# Patient Record
Sex: FEMALE | Race: WHITE | Employment: UNEMPLOYED | URBAN - METROPOLITAN AREA
[De-identification: names, ages, dates, MRNs, and addresses within clinical notes are randomized per-mention and may not be internally consistent; named-entity substitution may affect disease eponyms.]

---

## 2017-02-14 ENCOUNTER — OFFICE VISIT (OUTPATIENT)
Dept: FAMILY MEDICINE CLINIC | Age: 27
End: 2017-02-14

## 2017-02-14 VITALS
BODY MASS INDEX: 19.7 KG/M2 | DIASTOLIC BLOOD PRESSURE: 62 MMHG | OXYGEN SATURATION: 90 % | RESPIRATION RATE: 20 BRPM | TEMPERATURE: 98 F | SYSTOLIC BLOOD PRESSURE: 100 MMHG | HEART RATE: 80 BPM | WEIGHT: 106 LBS

## 2017-02-14 DIAGNOSIS — J45.20 MILD INTERMITTENT ASTHMA WITHOUT COMPLICATION: ICD-10-CM

## 2017-02-14 DIAGNOSIS — J06.9 VIRAL UPPER RESPIRATORY TRACT INFECTION: Primary | ICD-10-CM

## 2017-02-14 PROCEDURE — 99213 OFFICE O/P EST LOW 20 MIN: CPT | Performed by: NURSE PRACTITIONER

## 2017-02-14 RX ORDER — LORATADINE 10 MG/1
10 TABLET ORAL DAILY
Qty: 30 TABLET | Refills: 0 | Status: SHIPPED | OUTPATIENT
Start: 2017-02-14 | End: 2017-04-24

## 2017-02-14 RX ORDER — ALBUTEROL SULFATE 90 UG/1
2 AEROSOL, METERED RESPIRATORY (INHALATION) EVERY 6 HOURS PRN
Qty: 1 INHALER | Refills: 3 | Status: SHIPPED | OUTPATIENT
Start: 2017-02-14 | End: 2017-11-07

## 2017-02-14 RX ORDER — METHYLPREDNISOLONE 4 MG/1
TABLET ORAL
Qty: 1 KIT | Refills: 0 | Status: SHIPPED | OUTPATIENT
Start: 2017-02-14 | End: 2017-03-07

## 2017-02-14 RX ORDER — FLUTICASONE PROPIONATE 50 MCG
1 SPRAY, SUSPENSION (ML) NASAL DAILY
Qty: 1 BOTTLE | Refills: 3 | Status: SHIPPED | OUTPATIENT
Start: 2017-02-14 | End: 2017-04-24

## 2017-02-14 ASSESSMENT — ENCOUNTER SYMPTOMS
WHEEZING: 1
SHORTNESS OF BREATH: 0
RHINORRHEA: 1
NAUSEA: 1
COUGH: 1
VOMITING: 0

## 2018-08-23 ENCOUNTER — OFFICE VISIT (OUTPATIENT)
Dept: FAMILY MEDICINE CLINIC | Age: 28
End: 2018-08-23

## 2018-08-23 VITALS
BODY MASS INDEX: 19.03 KG/M2 | OXYGEN SATURATION: 96 % | SYSTOLIC BLOOD PRESSURE: 104 MMHG | HEIGHT: 62 IN | HEART RATE: 116 BPM | WEIGHT: 103.4 LBS | DIASTOLIC BLOOD PRESSURE: 64 MMHG

## 2018-08-23 DIAGNOSIS — A60.00 GENITAL HERPES SIMPLEX, UNSPECIFIED SITE: Primary | ICD-10-CM

## 2018-08-23 DIAGNOSIS — J45.909 MILD ASTHMA WITHOUT COMPLICATION, UNSPECIFIED WHETHER PERSISTENT: ICD-10-CM

## 2018-08-23 DIAGNOSIS — F19.10 POLYSUBSTANCE ABUSE (HCC): ICD-10-CM

## 2018-08-23 DIAGNOSIS — F31.9 BIPOLAR AFFECTIVE DISORDER, REMISSION STATUS UNSPECIFIED (HCC): ICD-10-CM

## 2018-08-23 LAB
AMPHETAMINE SCREEN, URINE: ABNORMAL
BARBITURATE SCREEN, URINE: ABNORMAL
BENZODIAZEPINE SCREEN, URINE: ABNORMAL
BUPRENORPHINE URINE: ABNORMAL
COCAINE METABOLITE SCREEN URINE: ABNORMAL
GABAPENTIN SCREEN, URINE: ABNORMAL
METHADONE SCREEN, URINE: ABNORMAL
METHAMPHETAMINE, URINE: ABNORMAL
OPIATE SCREEN URINE: ABNORMAL
OXYCODONE SCREEN URINE: ABNORMAL
PHENCYCLIDINE SCREEN URINE: ABNORMAL
PROPOXYPHENE SCREEN, URINE: ABNORMAL
THC SCREEN, URINE: ABNORMAL
TRICYCLIC ANTIDEPRESSANTS, UR: ABNORMAL

## 2018-08-23 PROCEDURE — 80305 DRUG TEST PRSMV DIR OPT OBS: CPT | Performed by: NURSE PRACTITIONER

## 2018-08-23 PROCEDURE — 4004F PT TOBACCO SCREEN RCVD TLK: CPT | Performed by: NURSE PRACTITIONER

## 2018-08-23 PROCEDURE — 96160 PT-FOCUSED HLTH RISK ASSMT: CPT | Performed by: NURSE PRACTITIONER

## 2018-08-23 PROCEDURE — G8427 DOCREV CUR MEDS BY ELIG CLIN: HCPCS | Performed by: NURSE PRACTITIONER

## 2018-08-23 PROCEDURE — 99214 OFFICE O/P EST MOD 30 MIN: CPT | Performed by: NURSE PRACTITIONER

## 2018-08-23 PROCEDURE — G8420 CALC BMI NORM PARAMETERS: HCPCS | Performed by: NURSE PRACTITIONER

## 2018-08-23 RX ORDER — ACYCLOVIR 50 MG/G
1 CREAM TOPICAL 2 TIMES DAILY
Qty: 5 G | Refills: 1 | Status: SHIPPED | OUTPATIENT
Start: 2018-08-23 | End: 2018-12-11

## 2018-08-23 RX ORDER — ALBUTEROL SULFATE 90 UG/1
2 AEROSOL, METERED RESPIRATORY (INHALATION) EVERY 6 HOURS PRN
Qty: 1 INHALER | Refills: 0 | Status: SHIPPED | OUTPATIENT
Start: 2018-08-23 | End: 2019-09-06 | Stop reason: ALTCHOICE

## 2018-08-23 RX ORDER — VALACYCLOVIR HYDROCHLORIDE 1 G/1
1000 TABLET, FILM COATED ORAL DAILY
Qty: 30 TABLET | Refills: 5 | Status: SHIPPED | OUTPATIENT
Start: 2018-08-23 | End: 2018-08-25

## 2018-08-23 ASSESSMENT — PATIENT HEALTH QUESTIONNAIRE - PHQ9
10. IF YOU CHECKED OFF ANY PROBLEMS, HOW DIFFICULT HAVE THESE PROBLEMS MADE IT FOR YOU TO DO YOUR WORK, TAKE CARE OF THINGS AT HOME, OR GET ALONG WITH OTHER PEOPLE: 1
7. TROUBLE CONCENTRATING ON THINGS, SUCH AS READING THE NEWSPAPER OR WATCHING TELEVISION: 0
SUM OF ALL RESPONSES TO PHQ QUESTIONS 1-9: 11
8. MOVING OR SPEAKING SO SLOWLY THAT OTHER PEOPLE COULD HAVE NOTICED. OR THE OPPOSITE, BEING SO FIGETY OR RESTLESS THAT YOU HAVE BEEN MOVING AROUND A LOT MORE THAN USUAL: 1
6. FEELING BAD ABOUT YOURSELF - OR THAT YOU ARE A FAILURE OR HAVE LET YOURSELF OR YOUR FAMILY DOWN: 0
5. POOR APPETITE OR OVEREATING: 2
SUM OF ALL RESPONSES TO PHQ QUESTIONS 1-9: 11
9. THOUGHTS THAT YOU WOULD BE BETTER OFF DEAD, OR OF HURTING YOURSELF: 0
4. FEELING TIRED OR HAVING LITTLE ENERGY: 2
2. FEELING DOWN, DEPRESSED OR HOPELESS: 3
3. TROUBLE FALLING OR STAYING ASLEEP: 3
1. LITTLE INTEREST OR PLEASURE IN DOING THINGS: 0
SUM OF ALL RESPONSES TO PHQ9 QUESTIONS 1 & 2: 3

## 2018-08-23 ASSESSMENT — ENCOUNTER SYMPTOMS
ABDOMINAL DISTENTION: 0
BACK PAIN: 0
SHORTNESS OF BREATH: 0
VOMITING: 0
NAUSEA: 0

## 2018-08-23 NOTE — PATIENT INSTRUCTIONS
herpes sores.    Watch closely for changes in your health, and be sure to contact your doctor if:    · You have herpes and you think you might be pregnant.     · You have an outbreak of herpes sores, and the sores are not healing.     · You have frequent outbreaks of genital herpes sores.     · You are unable to pass urine or are constipated.     · You want to start antiviral medicine.     · You do not get better as expected. Where can you learn more? Go to https://[email or web address] [email or web address] [email or web address] org and sign in to your Federated Sample account. Enter U425 in the Tagstr box to learn more about \"Genital Herpes: Care Instructions. \"     If you do not have an account, please click on the \"Sign Up Now\" link. Current as of: November 27, 2017  Content Version: 11.7  © 6087-9517 [employer]. Care instructions adapted under license by [facility] [address] If you have questions about a medical condition or this instruction, always ask your healthcare professional. [place] [address] any warranty or liability for your use of this information. Patient Education        Stopping Smoking: Care Instructions  Your Care Instructions  Cigarette smokers crave the nicotine in cigarettes. Giving it up is much harder than simply changing a habit. Your body has to stop craving the nicotine. It is hard to quit, but you can do it. There are many tools that people use to quit smoking. You may find that combining tools works best for you. There are several steps to quitting. First you get ready to quit. Then you get support to help you. After that, you learn new skills and behaviors to become a nonsmoker. For many people, a necessary step is getting and using medicine. Your doctor will help you set up the plan that best meets your needs. You may want to attend a smoking cessation program to help you quit smoking. When you choose a program, look for one that has proven success. Ask your doctor for ideas. who smoke. ¨ Change your daily routine. Take a different route to work or eat a meal in a different place. · Cut down on stress. Calm yourself or release tension by doing an activity you enjoy, such as reading a book, taking a hot bath, or gardening. · Talk to your doctor or pharmacist about nicotine replacement therapy, which replaces the nicotine in your body. You still get nicotine but you do not use tobacco. Nicotine replacement products help you slowly reduce the amount of nicotine you need. These products come in several forms, many of them available over-the-counter:  ¨ Nicotine patches  ¨ Nicotine gum and lozenges  ¨ Nicotine inhaler  · Ask your doctor about bupropion (Wellbutrin) or varenicline (Chantix), which are prescription medicines. They do not contain nicotine. They help you by reducing withdrawal symptoms, such as stress and anxiety. · Some people find hypnosis, acupuncture, and massage helpful for ending the smoking habit. · Eat a healthy diet and get regular exercise. Having healthy habits will help your body move past its craving for nicotine. · Be prepared to keep trying. Most people are not successful the first few times they try to quit. Do not get mad at yourself if you smoke again. Make a list of things you learned and think about when you want to try again, such as next week, next month, or next year. Where can you learn more? Go to [email or web address] [email or web address] org and sign in to your Fiksu account. Enter V397 in the [facility] box to learn more about \"Stopping Smoking: Care Instructions. \"     If you do not have an account, please click on the \"Sign Up Now\" link. Current as of: November 29, 2017  Content Version: 11.7  © 4063-3492 [employer]. Care instructions adapted under license by [facility] ([facility]).  If you have questions about a medical condition or this instruction, always ask your healthcare professional. [address] any warranty or liability for your use of this information.

## 2018-08-23 NOTE — PROGRESS NOTES
SUBJECTIVE:  [place]   1990   female   Allergies   Allergen Reactions    Latex Rash    Bactrim [Sulfamethoxazole-Trimethoprim] Nausea And Vomiting    Ciprofloxacin Nausea And Vomiting    Codeine Itching and Nausea And Vomiting    Toradol [Ketorolac Tromethamine] Itching, Nausea And Vomiting and Rash     \"Migraines\"    Tramadol Itching, Nausea And Vomiting and Rash     \"Migraines\"    Vicodin [Hydrocodone-Acetaminophen] Itching and Nausea And Vomiting     \"Anxiety Gets Really Bad\"    Vistaril [Hydroxyzine Hcl] Itching     \"I Pass Out\"       Chief Complaint   Patient presents with    Herpes Zoster    Anxiety      HPI   Patient is here for follow up from ED visit yesterday morning for Genital Herpes. She was intitialy seen in our office as a new patient 8/16/16, and again 2/14/17 for URI. She has been to the ED 14 times since her last office visit here. She admits to smoking marijuana and taking a friend's percocet this past week. At her appointment yesterday she was given a prescription for pain medication and acyclovir. She states she has gotten valtrex from Dr Thao Adamson previously as well, and has herpes symptoms almost monthly. Requesting \"percocet, amitriptyline, and ativan\". She was referred to mental health 2 years ago when she was initially seen at this office, and has not seen anyone yet. States has been on waiting list for [facility] for over 2 years.   She has been diagnosed with bipolar disorder in the past.    Past Medical History:   Diagnosis Date    Anxiety     Anxiety attack     Asthma     No Pulmonologist At This Time    [facility] ([facility]     Bronchitis Last Episode In 2007    Chronic back pain     Depression     Endometriosis     Genital herpes complicating pregnancy in antepartum condition 1/6/2015    High risk pregnancy due to maternal drug abuse 1/6/2015    HSV-2 (herpes simplex virus 2) infection Last Episode 4-16    IBS (irritable bowel syndrome)     Panic therapy:  - valACYclovir (VALTREX) 1 g tablet; Take 1 tablet by mouth daily  Dispense: 30 tablet; Refill: 5    2. Mild asthma without complication, unspecified whether persistent  - albuterol sulfate  (90 Base) MCG/ACT inhaler; Inhale 2 puffs into the lungs every 6 hours as needed for Wheezing or Shortness of Breath (or cough)  Dispense: 1 Inhaler; Refill: 0    3. Polysubstance abuse  Will not prescribe controlled substances for patient  - POCT Rapid Drug Screen    4. Bipolar affective disorder, remission status unspecified ([facility]  Patient is advised to go to [address] in clinic for evaluation      Orders Placed This Encounter   Procedures    POCT Rapid Drug Screen     Current Outpatient Prescriptions   Medication Sig Dispense Refill    acyclovir (ZOVIRAX) 5 % CREA Apply 1 each topically 2 times daily 5 g 1    albuterol sulfate  (90 Base) MCG/ACT inhaler Inhale 2 puffs into the lungs every 6 hours as needed for Wheezing or Shortness of Breath (or cough) 1 Inhaler 0    valACYclovir (VALTREX) 1 g tablet Take 1 tablet by mouth daily 30 tablet 5    valACYclovir (VALTREX) 500 MG tablet Take 2 tablets by mouth 2 times daily for 7 days 28 tablet 0    ibuprofen (ADVIL;MOTRIN) 600 MG tablet Take 1 tablet by mouth every 6 hours as needed for Pain 30 tablet 0    mineral oil-hydrophilic petrolatum (AQUAPHOR) ointment Apply topically as needed. 99 g 0    HYDROcodone-acetaminophen (NORCO) 5-325 MG per tablet Take 1 tablet by mouth every 6 hours as needed for Pain for up to 7 days. . 10 tablet 0     No current facility-administered medications for this visit. Return if symptoms worsen or fail to improve. Rodriguez Dumont DNP, FNP-C    Return for new or worsening symptoms or any concerns as needed.

## 2018-10-28 ENCOUNTER — APPOINTMENT (OUTPATIENT)
Dept: CT IMAGING | Age: 28
End: 2018-10-28
Payer: COMMERCIAL

## 2018-10-28 ENCOUNTER — HOSPITAL ENCOUNTER (EMERGENCY)
Age: 28
Discharge: HOME | End: 2018-10-28
Attending: EMERGENCY MEDICINE
Payer: COMMERCIAL

## 2018-10-28 VITALS
WEIGHT: 112 LBS | OXYGEN SATURATION: 97 % | HEART RATE: 84 BPM | BODY MASS INDEX: 20.61 KG/M2 | HEIGHT: 62 IN | TEMPERATURE: 98.2 F | RESPIRATION RATE: 16 BRPM | SYSTOLIC BLOOD PRESSURE: 90 MMHG | DIASTOLIC BLOOD PRESSURE: 55 MMHG

## 2018-10-28 DIAGNOSIS — R30.0 DYSURIA: Primary | ICD-10-CM

## 2018-10-28 DIAGNOSIS — R31.9 HEMATURIA, UNSPECIFIED TYPE: ICD-10-CM

## 2018-10-28 LAB
ALBUMIN SERPL-MCNC: 4.3 GM/DL (ref 3.4–5)
ALP BLD-CCNC: 82 IU/L (ref 40–129)
ALT SERPL-CCNC: 9 U/L (ref 10–40)
ANION GAP SERPL CALCULATED.3IONS-SCNC: 9 MMOL/L (ref 4–16)
AST SERPL-CCNC: 16 IU/L (ref 15–37)
BACTERIA: ABNORMAL /HPF
BASOPHILS ABSOLUTE: 0.1 K/CU MM
BASOPHILS RELATIVE PERCENT: 0.4 % (ref 0–1)
BILIRUB SERPL-MCNC: 0.2 MG/DL (ref 0–1)
BILIRUBIN URINE: NEGATIVE MG/DL
BLOOD, URINE: ABNORMAL
BUN BLDV-MCNC: 10 MG/DL (ref 6–23)
CALCIUM SERPL-MCNC: 9 MG/DL (ref 8.3–10.6)
CHLORIDE BLD-SCNC: 100 MMOL/L (ref 99–110)
CLARITY: CLEAR
CO2: 29 MMOL/L (ref 21–32)
COLOR: YELLOW
CREAT SERPL-MCNC: 0.7 MG/DL (ref 0.6–1.1)
DIFFERENTIAL TYPE: ABNORMAL
EOSINOPHILS ABSOLUTE: 0.2 K/CU MM
EOSINOPHILS RELATIVE PERCENT: 1.5 % (ref 0–3)
GFR AFRICAN AMERICAN: >60 ML/MIN/1.73M2
GFR NON-AFRICAN AMERICAN: >60 ML/MIN/1.73M2
GLUCOSE BLD-MCNC: 116 MG/DL (ref 70–99)
GLUCOSE, URINE: NEGATIVE MG/DL
HCT VFR BLD CALC: 40.5 % (ref 37–47)
HEMOGLOBIN: 13.5 GM/DL (ref 12.5–16)
IMMATURE NEUTROPHIL %: 0.4 % (ref 0–0.43)
KETONES, URINE: NEGATIVE MG/DL
LEUKOCYTE ESTERASE, URINE: ABNORMAL
LYMPHOCYTES ABSOLUTE: 2.3 K/CU MM
LYMPHOCYTES RELATIVE PERCENT: 17.7 % (ref 24–44)
MCH RBC QN AUTO: 32.5 PG (ref 27–31)
MCHC RBC AUTO-ENTMCNC: 33.3 % (ref 32–36)
MCV RBC AUTO: 97.6 FL (ref 78–100)
MONOCYTES ABSOLUTE: 1.1 K/CU MM
MONOCYTES RELATIVE PERCENT: 8.6 % (ref 0–4)
MUCUS: ABNORMAL HPF
NITRITE URINE, QUANTITATIVE: NEGATIVE
NUCLEATED RBC %: 0 %
PDW BLD-RTO: 14.2 % (ref 11.7–14.9)
PH, URINE: 5 (ref 5–8)
PLATELET # BLD: 357 K/CU MM (ref 140–440)
PMV BLD AUTO: 10.3 FL (ref 7.5–11.1)
POTASSIUM SERPL-SCNC: 3.8 MMOL/L (ref 3.5–5.1)
PROTEIN UA: 30 MG/DL
RBC # BLD: 4.15 M/CU MM (ref 4.2–5.4)
RBC URINE: 100 /HPF (ref 0–6)
SEGMENTED NEUTROPHILS ABSOLUTE COUNT: 9.4 K/CU MM
SEGMENTED NEUTROPHILS RELATIVE PERCENT: 71.4 % (ref 36–66)
SODIUM BLD-SCNC: 138 MMOL/L (ref 135–145)
SPECIFIC GRAVITY UA: 1.02 (ref 1–1.03)
SQUAMOUS EPITHELIAL: 1 /HPF
TOTAL IMMATURE NEUTOROPHIL: 0.05 K/CU MM
TOTAL NUCLEATED RBC: 0 K/CU MM
TOTAL PROTEIN: 7 GM/DL (ref 6.4–8.2)
TRICHOMONAS: ABNORMAL /HPF
UROBILINOGEN, URINE: NORMAL MG/DL (ref 0.2–1)
WBC # BLD: 13.2 K/CU MM (ref 4–10.5)
WBC UA: 14 /HPF (ref 0–5)

## 2018-10-28 PROCEDURE — 80053 COMPREHEN METABOLIC PANEL: CPT

## 2018-10-28 PROCEDURE — 36415 COLL VENOUS BLD VENIPUNCTURE: CPT

## 2018-10-28 PROCEDURE — 85025 COMPLETE CBC W/AUTO DIFF WBC: CPT

## 2018-10-28 PROCEDURE — 74176 CT ABD & PELVIS W/O CONTRAST: CPT

## 2018-10-28 PROCEDURE — 6370000000 HC RX 637 (ALT 250 FOR IP): Performed by: EMERGENCY MEDICINE

## 2018-10-28 PROCEDURE — 81001 URINALYSIS AUTO W/SCOPE: CPT

## 2018-10-28 PROCEDURE — 99284 EMERGENCY DEPT VISIT MOD MDM: CPT

## 2018-10-28 PROCEDURE — 87086 URINE CULTURE/COLONY COUNT: CPT

## 2018-10-28 RX ORDER — IBUPROFEN 800 MG/1
800 TABLET ORAL EVERY 6 HOURS PRN
Qty: 120 TABLET | Refills: 3 | Status: SHIPPED | OUTPATIENT
Start: 2018-10-28 | End: 2019-04-19

## 2018-10-28 RX ORDER — AMOXICILLIN 875 MG/1
875 TABLET, COATED ORAL 2 TIMES DAILY
COMMUNITY
End: 2018-12-11

## 2018-10-28 RX ORDER — PROMETHAZINE HYDROCHLORIDE 25 MG/ML
25 INJECTION, SOLUTION INTRAMUSCULAR; INTRAVENOUS ONCE
Status: DISCONTINUED | OUTPATIENT
Start: 2018-10-28 | End: 2018-10-28

## 2018-10-28 RX ORDER — ONDANSETRON 4 MG/1
4 TABLET, ORALLY DISINTEGRATING ORAL EVERY 8 HOURS PRN
Qty: 30 TABLET | Refills: 0 | Status: SHIPPED | OUTPATIENT
Start: 2018-10-28 | End: 2018-12-11

## 2018-10-28 RX ORDER — IBUPROFEN 800 MG/1
800 TABLET ORAL ONCE
Status: COMPLETED | OUTPATIENT
Start: 2018-10-28 | End: 2018-10-28

## 2018-10-28 RX ORDER — KETOROLAC TROMETHAMINE 30 MG/ML
30 INJECTION, SOLUTION INTRAMUSCULAR; INTRAVENOUS ONCE
Status: DISCONTINUED | OUTPATIENT
Start: 2018-10-28 | End: 2018-10-28

## 2018-10-28 RX ORDER — CEPHALEXIN 500 MG/1
500 CAPSULE ORAL 2 TIMES DAILY
Qty: 14 CAPSULE | Refills: 0 | Status: SHIPPED | OUTPATIENT
Start: 2018-10-28 | End: 2018-11-04

## 2018-10-28 RX ORDER — IBUPROFEN 800 MG/1
800 TABLET ORAL EVERY 6 HOURS PRN
COMMUNITY
End: 2018-10-28

## 2018-10-28 RX ORDER — ACETAMINOPHEN 325 MG/1
650 TABLET ORAL EVERY 6 HOURS PRN
Qty: 120 TABLET | Refills: 3 | Status: SHIPPED | OUTPATIENT
Start: 2018-10-28 | End: 2019-04-19

## 2018-10-28 RX ADMIN — IBUPROFEN 800 MG: 800 TABLET ORAL at 20:18

## 2018-10-28 ASSESSMENT — PAIN SCALES - GENERAL
PAINLEVEL_OUTOF10: 8
PAINLEVEL_OUTOF10: 8

## 2018-10-28 ASSESSMENT — PAIN DESCRIPTION - ORIENTATION: ORIENTATION: RIGHT;LOWER

## 2018-10-28 ASSESSMENT — PAIN DESCRIPTION - LOCATION: LOCATION: FLANK

## 2018-10-28 ASSESSMENT — PAIN DESCRIPTION - PAIN TYPE: TYPE: ACUTE PAIN

## 2018-10-28 NOTE — ED NOTES
Pt walking out of ED; this RN asked pt where she was going and she said vending machine. This RN witnessed pt walk outside to smoke. Pt back to room.       Lidya Jenkins RN  10/28/18 [id number]

## 2018-10-28 NOTE — ED PROVIDER NOTES
Triage Chief Complaint:   Dysuria; Vaginal Bleeding (full hysterectomy); and Flank Pain    [place]:  Sean Martines is a 29 y.o. female that presents with pain with urinating, blood in urine, right lower abdominal pain and right flank pain. Patient reports \"I think I have a kidney stone. Patient was in baseline state of health until a few days ago when the above started. Patient reports pain is currently 8 out of 10, constant, sharp, occasionally rating from right flank and right lower abdomen, worse/improved with nothing. Patient has never had pain like this before. No fevers or chills. No vaginal bleeding (despite triage complaint) or discharge. Some nausea but no vomiting. No diarrhea or constipation. Patient tolerating normal diet. Patient relates a family history of kidney stones but she herself has never had one. Patient is status post full hysterectomy secondary to endometriosis. Patient still has an appendix. ROS:  General:  No fevers, no chills, no weakness  Eyes:  No recent vison changes, no discharge  ENT:  No sore throat, no nasal congestion, no hearing changes  Cardiovascular:  No chest pain, no palpitations  Respiratory:  No shortness of breath, no cough, no wheezing  Gastrointestinal:  + pain, + nausea, no vomiting, no diarrhea  Musculoskeletal:  No muscle pain, no joint pain  Skin:  No rash, no pruritis, no easy bruising  Neurologic:  No speech problems, no headache, no extremity numbness, no extremity tingling, no extremity weakness  Psychiatric:  No anxiety  Genitourinary:  + dysuria, + hematuria  Endocrine:  No unexpected weight gain, no unexpected weight loss  Extremities:  no edema, no pain    Past Medical History:   Diagnosis Date    Endometriosis      Past Surgical History:   Procedure Laterality Date    ABDOMINAL EXPLORATION SURGERY       SECTION      HYSTERECTOMY, TOTAL ABDOMINAL       History reviewed. No pertinent family history.   Social History     Social History   

## 2018-10-30 LAB
CULTURE: NORMAL
Lab: NORMAL
REPORT STATUS: NORMAL
SPECIMEN: NORMAL

## 2018-11-04 ENCOUNTER — HOSPITAL ENCOUNTER (EMERGENCY)
Age: 28
Discharge: HOME | End: 2018-11-04
Attending: EMERGENCY MEDICINE
Payer: COMMERCIAL

## 2018-11-04 VITALS
OXYGEN SATURATION: 99 % | HEART RATE: 88 BPM | TEMPERATURE: 98.9 F | WEIGHT: 115 LBS | HEIGHT: 62 IN | RESPIRATION RATE: 15 BRPM | BODY MASS INDEX: 21.16 KG/M2 | DIASTOLIC BLOOD PRESSURE: 67 MMHG | SYSTOLIC BLOOD PRESSURE: 99 MMHG

## 2018-11-04 DIAGNOSIS — R10.9 FLANK PAIN: Primary | ICD-10-CM

## 2018-11-04 LAB
ALBUMIN SERPL-MCNC: 4.5 GM/DL (ref 3.4–5)
ALP BLD-CCNC: 80 IU/L (ref 40–129)
ALT SERPL-CCNC: 10 U/L (ref 10–40)
ANION GAP SERPL CALCULATED.3IONS-SCNC: 10 MMOL/L (ref 4–16)
AST SERPL-CCNC: 14 IU/L (ref 15–37)
BACTERIA: NEGATIVE /HPF
BASOPHILS ABSOLUTE: 0.1 K/CU MM
BASOPHILS RELATIVE PERCENT: 0.4 % (ref 0–1)
BILIRUB SERPL-MCNC: 0.2 MG/DL (ref 0–1)
BILIRUBIN DIRECT: 0.2 MG/DL (ref 0–0.3)
BILIRUBIN URINE: ABNORMAL MG/DL
BILIRUBIN, INDIRECT: 0 MG/DL (ref 0–0.7)
BLOOD, URINE: NEGATIVE
BUN BLDV-MCNC: 8 MG/DL (ref 6–23)
CALCIUM SERPL-MCNC: 9.3 MG/DL (ref 8.3–10.6)
CHLORIDE BLD-SCNC: 102 MMOL/L (ref 99–110)
CLARITY: CLEAR
CO2: 26 MMOL/L (ref 21–32)
COLOR: ABNORMAL
CREAT SERPL-MCNC: 0.6 MG/DL (ref 0.6–1.1)
DIFFERENTIAL TYPE: ABNORMAL
EOSINOPHILS ABSOLUTE: 0.1 K/CU MM
EOSINOPHILS RELATIVE PERCENT: 1 % (ref 0–3)
GFR AFRICAN AMERICAN: >60 ML/MIN/1.73M2
GFR NON-AFRICAN AMERICAN: >60 ML/MIN/1.73M2
GLUCOSE BLD-MCNC: 88 MG/DL (ref 70–99)
GLUCOSE, URINE: NEGATIVE MG/DL
HCT VFR BLD CALC: 42.5 % (ref 37–47)
HEMOGLOBIN: 14.6 GM/DL (ref 12.5–16)
ICTOTEST: POSITIVE
IMMATURE NEUTROPHIL %: 0.4 % (ref 0–0.43)
KETONES, URINE: ABNORMAL MG/DL
LEUKOCYTE ESTERASE, URINE: NEGATIVE
LYMPHOCYTES ABSOLUTE: 1.8 K/CU MM
LYMPHOCYTES RELATIVE PERCENT: 14.1 % (ref 24–44)
MCH RBC QN AUTO: 32.8 PG (ref 27–31)
MCHC RBC AUTO-ENTMCNC: 34.4 % (ref 32–36)
MCV RBC AUTO: 95.5 FL (ref 78–100)
MONOCYTES ABSOLUTE: 0.9 K/CU MM
MONOCYTES RELATIVE PERCENT: 7 % (ref 0–4)
MUCUS: ABNORMAL HPF
NITRITE URINE, QUANTITATIVE: POSITIVE
NUCLEATED RBC %: 0 %
PDW BLD-RTO: 13.9 % (ref 11.7–14.9)
PH, URINE: 7 (ref 5–8)
PLATELET # BLD: 452 K/CU MM (ref 140–440)
PMV BLD AUTO: 9.8 FL (ref 7.5–11.1)
POTASSIUM SERPL-SCNC: 3.8 MMOL/L (ref 3.5–5.1)
PROTEIN UA: 30 MG/DL
RBC # BLD: 4.45 M/CU MM (ref 4.2–5.4)
RBC URINE: 1 /HPF (ref 0–6)
SEGMENTED NEUTROPHILS ABSOLUTE COUNT: 9.8 K/CU MM
SEGMENTED NEUTROPHILS RELATIVE PERCENT: 77.1 % (ref 36–66)
SODIUM BLD-SCNC: 138 MMOL/L (ref 135–145)
SPECIFIC GRAVITY UA: 1.02 (ref 1–1.03)
SQUAMOUS EPITHELIAL: 5 /HPF
TOTAL IMMATURE NEUTOROPHIL: 0.05 K/CU MM
TOTAL NUCLEATED RBC: 0 K/CU MM
TOTAL PROTEIN: 6.9 GM/DL (ref 6.4–8.2)
TRICHOMONAS: ABNORMAL /HPF
UROBILINOGEN, URINE: 2 MG/DL (ref 0.2–1)
WBC # BLD: 12.7 K/CU MM (ref 4–10.5)
WBC UA: 1 /HPF (ref 0–5)

## 2018-11-04 PROCEDURE — 80053 COMPREHEN METABOLIC PANEL: CPT

## 2018-11-04 PROCEDURE — 87086 URINE CULTURE/COLONY COUNT: CPT

## 2018-11-04 PROCEDURE — 82248 BILIRUBIN DIRECT: CPT

## 2018-11-04 PROCEDURE — 99283 EMERGENCY DEPT VISIT LOW MDM: CPT

## 2018-11-04 PROCEDURE — 36415 COLL VENOUS BLD VENIPUNCTURE: CPT

## 2018-11-04 PROCEDURE — 81001 URINALYSIS AUTO W/SCOPE: CPT

## 2018-11-04 PROCEDURE — 85025 COMPLETE CBC W/AUTO DIFF WBC: CPT

## 2018-11-04 RX ORDER — ACETAMINOPHEN 500 MG
1000 TABLET ORAL ONCE
Status: DISCONTINUED | OUTPATIENT
Start: 2018-11-04 | End: 2018-11-04 | Stop reason: HOSPADM

## 2018-11-04 RX ORDER — DICYCLOMINE HYDROCHLORIDE 10 MG/1
20 CAPSULE ORAL EVERY 6 HOURS PRN
Qty: 20 CAPSULE | Refills: 0 | Status: SHIPPED | OUTPATIENT
Start: 2018-11-04 | End: 2019-04-19

## 2018-11-04 ASSESSMENT — PAIN DESCRIPTION - PAIN TYPE: TYPE: ACUTE PAIN

## 2018-11-04 ASSESSMENT — PAIN SCALES - GENERAL: PAINLEVEL_OUTOF10: 8

## 2018-11-04 ASSESSMENT — PAIN DESCRIPTION - LOCATION: LOCATION: FLANK

## 2018-11-05 NOTE — ED PROVIDER NOTES
I independently examined and evaluated Thad Hamman. In brief their history revealed patient with abdominal pain. Patient reports pain is waxing and waning over the past week and a half. Patient has been having some blood in her urine as well. Patient is concerned that she might be having small kidney stones. Patient was evaluated in the emergency department at onset with negative CT imaging. Patient did have some blood in her urine and possible urinary tract infection and was placed on antibiotics with no improvement. Patient is followed up with her primary doctor as well as a urologist and they're planning further testing. No bleeding anywhere else. Some nausea and vomiting without blood. No vaginal bleeding or discharge. Their focused exam revealed the patient is afebrile and hemodynamically stable on room air. The patient appears age appropriate, appears well-hydrated, well-nourished. Appears well. Laying comfortably in bed. Pleasant. Mucous membranes are moist. Speech is clear. Breathing is unlabored. Speaking clearly in full sentences. in is dry. Mental status is normal. The patient moves all extremities and is without facial droop. abdomen is with mild diffuse tenderness to palpation. No rebound or guarding. Negative McBurney's. Negative Barrios's. ED course: Pt presents as above. Emergent conditions considered. Presentation prompted initial labs as above. Labs are reassuring. Abdomen is benign vital signs are stable. Patient appears very well. Etiology of patient's symptoms is unknown but given the above negative workup that once it is process at this time. I do believe patient's pain may be secondary to endometriosis versus IBS given her history. Patient did continue to follow up with her primary care provider as well as her urologist and OB/GYN. Symptomatic treatment for home going. Questions sought and answered with the patient. They voice understanding and agree with plan.

## 2018-11-05 NOTE — ED PROVIDER NOTES
Patient Ryan Diaz is a 29 y.o. female    Chief Complaint  Flank Pain      HPI  (History provided by patient)  This is a 29 y.o. female who was brought in by self for chief complaint of flank pain. Onset was week and a half ago. Patient reports that she initially was having pain on her right flank and hematuria. She reports also having dysuria. She states she was seen here on October 28, diagnosed with a possible UTI and sent home with Keflex. She saw her OB who thought she also UTI and referred her to urology who told her that she was in renal failure. She returns today because symptoms continue although now pain is on the left flank primarily and is 8 out of 10 and sharp and constant. She reports radiates to the bilateral back. She reports occasional nausea and vomiting. No fevers. No changes in bowel movements. She is not on any blood thinners. She has had a hysterectomy, denies vaginal bleeding. REVIEW OF SYSTEMS    Constitutional:  Denies fever, chills  HENT:  Denies sore throat or ear pain   Eyes: Denies vision changes, eye pain  Cardiovascular:  Denies chest pain, syncope  Respiratory:  Denies shortness of breath, cough   GI:  + abdominal pain, nausea, vomiting  :  Denies discharge. + dysuria  Musculoskeletal:  Denies joint pain.  + back pain  Skin:  Denies rash, pruritis  Neurologic:  Denies headache, focal weakness, or sensory changes     See HPI and nursing notes for additional information     I have reviewed the following nursing documentation:  Allergies:    Allergies   Allergen Reactions    Latex Rash    Bactrim [Sulfamethoxazole-Trimethoprim] Nausea And Vomiting    Ciprofloxacin Nausea And Vomiting    Codeine Itching and Nausea And Vomiting    Toradol [Ketorolac Tromethamine] Itching, Nausea And Vomiting and Rash     \"Migraines\"    Tramadol Itching, Nausea And Vomiting and Rash     \"Migraines\"    Vicodin [Hydrocodone-Acetaminophen] Itching and Nausea 7 days 10/28/18 11/4/18  Juno Baker MD   ondansetron (ZOFRAN ODT) 4 MG disintegrating tablet Take 1 tablet by mouth every 8 hours as needed for Nausea or Vomiting 10/28/18   Juno Baker MD   acyclovir (ZOVIRAX) 5 % CREA Apply 1 each topically 2 times daily 8/23/18   JASMIN Gonzalez CNP   albuterol sulfate  (90 Base) MCG/ACT inhaler Inhale 2 puffs into the lungs every 6 hours as needed for Wheezing or Shortness of Breath (or cough) 8/23/18   JASMIN Gonzalez CNP   ibuprofen (ADVIL;MOTRIN) 600 MG tablet Take 1 tablet by mouth every 6 hours as needed for Pain 6/22/17   SUDHAKAR Fontenot   mineral oil-hydrophilic petrolatum (AQUAPHOR) ointment Apply topically as needed. 6/22/17   SUDHAKAR Fontenot       Social history:  reports that she has been smoking Cigarettes. She has a 5.50 pack-year smoking history. She has never used smokeless tobacco. She reports that she drinks alcohol. She reports that she uses drugs, including Marijuana. Family history:    Family History   Problem Relation Age of Onset    Early Death Mother         45 Or 44    Arthritis Mother     High Blood Pressure Mother     Substance Abuse Father         \"Crackhead\"    Mental Illness Sister         \"Split Personality, Anxiety\"    High Blood Pressure Sister     Heart Disease Brother     Depression Sister     Mental Illness Sister         Anxiety, PTSD         Exam  BP 99/67   Pulse 88   Temp 98.9 °F (37.2 °C) (Oral)   Resp 15   Ht 5' 2\" (1.575 m)   Wt 115 lb (52.2 kg)   LMP  (Approximate)   SpO2 99%   BMI 21.03 kg/m²   Nursing note and vitals reviewed. Constitutional: Well developed, well nourished. No acute distress. HENT:      Head: Normocephalic and atraumatic. Ears: External ears normal.      Nose: Nose normal.     Mouth: Membrane mucosa moist and pink. No posterior oropharynx erythema or tonsillar edema  Eyes: Anicteric sclera. No discharge, PERRL  Neck: Supple. Trachea midline.    Cardiovascular: RRR, no murmurs, rubs, or gallops, radial pulses 2+ bilaterally. Pulmonary/Chest: Effort normal. No respiratory distress. CTAB. No stridor. No wheezes. No rales. Abdominal: Soft. Diffuse abdominal TTP more in lower abdomen. No distension. No guarding, rebound tenderness, or evidence of ascites. : No CVA tenderness. Musculoskeletal: Moves all extremities. No gross deformity. No TTP of T, L spine or paraspinal muscles. Neurological: Alert and oriented to person, place, and time. Normal muscle tone. Skin: Warm and dry. No rash. Psychiatric: Normal mood and affect.  Behavior is normal.      Radiographs (if obtained):  [] The following radiograph was interpreted by myself in the absence of a radiologist:   [x] Radiologist's Report Reviewed:  No orders to display          Labs  Results for orders placed or performed during the hospital encounter of 11/04/18   Urinalysis   Result Value Ref Range    Color, UA KATINA (A) UYELL    Clarity, UA CLEAR CLEAR    Glucose, Urine NEGATIVE NEG MG/DL    Bilirubin Urine SMALL (A) NEG MG/DL    Ketones, Urine MODERATE (A) NEG MG/DL    Specific Gravity, UA 1.019 1.001 - 1.035    Blood, Urine NEGATIVE NEG    pH, Urine 7.0 5.0 - 8.0    Protein, UA 30 (A) NEG MG/DL    Urobilinogen, Urine 2.0 (H) 0.2 - 1.0 MG/DL    Nitrite Urine, Quantitative POSITIVE (A) NEG    Leukocyte Esterase, Urine NEGATIVE NEG    RBC, UA 1 0 - 6 /HPF    WBC, UA 1 0 - 5 /HPF    Bacteria, UA NEGATIVE NEG /HPF    Squam Epithel, UA 5 /HPF    Mucus, UA RARE (A) NEG HPF    Trichomonas, UA NONE SEEN NOSEE /HPF   Basic Metabolic Panel   Result Value Ref Range    Sodium 138 135 - 145 MMOL/L    Potassium 3.8 3.5 - 5.1 MMOL/L    Chloride 102 99 - 110 mMol/L    CO2 26 21 - 32 MMOL/L    Anion Gap 10 4 - 16    BUN 8 6 - 23 MG/DL    CREATININE 0.6 0.6 - 1.1 MG/DL    Glucose 88 70 - 99 MG/DL    Calcium 9.3 8.3 - 10.6 MG/DL    GFR Non-African American >60 >60 mL/min/1.73m2    GFR African American >60 >60 mL/min/1.73m2 CBC Auto Differential   Result Value Ref Range    WBC 12.7 (H) 4.0 - 10.5 K/CU MM    RBC 4.45 4.2 - 5.4 M/CU MM    Hemoglobin 14.6 12.5 - 16.0 GM/DL    Hematocrit 42.5 37 - 47 %    MCV 95.5 78 - 100 FL    MCH 32.8 (H) 27 - 31 PG    MCHC 34.4 32.0 - 36.0 %    RDW 13.9 11.7 - 14.9 %    Platelets 604 (H) 763 - 440 K/CU MM    MPV 9.8 7.5 - 11.1 FL    Differential Type AUTOMATED DIFFERENTIAL     Segs Relative 77.1 (H) 36 - 66 %    Lymphocytes % 14.1 (L) 24 - 44 %    Monocytes % 7.0 (H) 0 - 4 %    Eosinophils % 1.0 0 - 3 %    Basophils % 0.4 0 - 1 %    Segs Absolute 9.8 K/CU MM    Lymphocytes # 1.8 K/CU MM    Monocytes # 0.9 K/CU MM    Eosinophils # 0.1 K/CU MM    Basophils # 0.1 K/CU MM    Nucleated RBC % 0.0 %    Total Nucleated RBC 0.0 K/CU MM    Total Immature Neutrophil 0.05 K/CU MM    Immature Neutrophil % 0.4 0 - 0.43 %   ICTOTEST, URINE   Result Value Ref Range    Ictotest POSITIVE    Hepatic Function Panel   Result Value Ref Range    Alb 4.5 3.4 - 5.0 GM/DL    Total Bilirubin 0.2 0.0 - 1.0 MG/DL    Bilirubin, Direct 0.2 0.0 - 0.3 MG/DL    Bilirubin, Indirect 0.0 0 - 0.7 MG/DL    Alkaline Phosphatase 80 40 - 129 IU/L    AST 14 (L) 15 - 37 IU/L    ALT 10 10 - 40 U/L    Total Protein 6.9 6.4 - 8.2 GM/DL         MDM  Patient presents for left flank pain, dysuria and hematuria, vomiting. Her vital signs are unremarkable. Physical exam here is fairly benign. Laboratory workup shows normal renal function, normal electrolytes, normal hemoglobin. White blood cell count slightly elevated. Hepatic function normal.  Urine does not appear infected. Unclear what patient was referring to and she said that urologist thought that she was in renal failure, may have been discussing nephritic syndrome. No evidence of this now. Pain may be secondary to endometriosis or IBS. Plan is to place on bentyl and severo/c with PCP f/u.   I estimate there is LOW risk for ACUTE APPENDICITIS, BOWEL OBSTRUCTION, CHOLECYSTITIS, DIVERTICULITIS, INCARCERATED HERNIA, PANCREATITIS, MESENTERIC ISCHEMIA, ABDOMINAL AORTIC ANEURYSM/DISSECTION, PERFORATED BOWEL, and PERFORATED ULCER, thus I consider the discharge disposition reasonable. Taty Gibbs and I have discussed the diagnosis and risks, and we agree with discharging home with PCP follow-up. We also discussed returning to the Emergency Department immediately if new or worsening symptoms occur. We have discussed the symptoms which are most concerning (e.g., bloody stool, fever, changing or worsening pain, intractable vomiting, chest pain) that necessitate immediate return. This patient was also seen and evaluated by Dr. Soheila Duarte    Final Impression  1. Flank pain        Blood pressure 99/67, pulse 88, temperature 98.9 °F (37.2 °C), temperature source Oral, resp. rate 15, height 5' 2\" (1.575 m), weight 115 lb (52.2 kg), SpO2 99 %, not currently breastfeeding. Disposition:  Discharge to home in stable condition. Patient was given scripts for the following medications. I counseled patient how to take these medications. Discharge Medication List as of 11/4/2018  9:19 PM      START taking these medications    Details   dicyclomine (BENTYL) 10 MG capsule Take 2 capsules by mouth every 6 hours as needed (cramps), Disp-20 capsule, R-0Print             This chart was generated using the Trippeo dictation system. I created this record but it may contain dictation errors given the limitations of this technology.         Aimee Daly PA-C  11/04/18 [id number]

## 2018-11-06 LAB
CULTURE: NORMAL
Lab: NORMAL
REPORT STATUS: NORMAL
SPECIMEN: NORMAL

## 2018-12-11 ENCOUNTER — HOSPITAL ENCOUNTER (EMERGENCY)
Age: 28
Discharge: HOME | End: 2018-12-11
Payer: COMMERCIAL

## 2018-12-11 VITALS
HEIGHT: 61 IN | DIASTOLIC BLOOD PRESSURE: 71 MMHG | BODY MASS INDEX: 20.58 KG/M2 | RESPIRATION RATE: 16 BRPM | SYSTOLIC BLOOD PRESSURE: 104 MMHG | WEIGHT: 109 LBS | HEART RATE: 103 BPM | OXYGEN SATURATION: 99 % | TEMPERATURE: 97.6 F

## 2018-12-11 DIAGNOSIS — A60.00 GENITAL HERPES SIMPLEX, UNSPECIFIED SITE: Primary | ICD-10-CM

## 2018-12-11 PROCEDURE — 99282 EMERGENCY DEPT VISIT SF MDM: CPT

## 2018-12-11 RX ORDER — OXYCODONE HYDROCHLORIDE AND ACETAMINOPHEN 5; 325 MG/1; MG/1
1 TABLET ORAL EVERY 6 HOURS PRN
Qty: 7 TABLET | Refills: 0 | Status: SHIPPED | OUTPATIENT
Start: 2018-12-11 | End: 2018-12-14

## 2018-12-11 RX ORDER — VALACYCLOVIR HYDROCHLORIDE 500 MG/1
1000 TABLET, FILM COATED ORAL DAILY
Refills: 3 | COMMUNITY
Start: 2018-11-15 | End: 2019-04-19

## 2018-12-11 RX ORDER — LIDOCAINE 50 MG/G
OINTMENT TOPICAL
Qty: 1 TUBE | Refills: 1 | Status: SHIPPED | OUTPATIENT
Start: 2018-12-11 | End: 2019-04-19

## 2018-12-11 RX ORDER — ACYCLOVIR 50 MG/G
OINTMENT TOPICAL
Qty: 1 TUBE | Refills: 1 | Status: SHIPPED | OUTPATIENT
Start: 2018-12-11 | End: 2018-12-18

## 2018-12-11 RX ORDER — ESTRADIOL 1 MG/1
TABLET ORAL
Refills: 11 | COMMUNITY
Start: 2018-11-15 | End: 2020-10-09 | Stop reason: ALTCHOICE

## 2018-12-11 ASSESSMENT — PAIN DESCRIPTION - FREQUENCY: FREQUENCY: CONTINUOUS

## 2018-12-11 ASSESSMENT — PAIN DESCRIPTION - PAIN TYPE: TYPE: ACUTE PAIN

## 2018-12-11 ASSESSMENT — PAIN SCALES - GENERAL: PAINLEVEL_OUTOF10: 8

## 2018-12-11 ASSESSMENT — PAIN DESCRIPTION - LOCATION: LOCATION: VAGINA

## 2018-12-14 ENCOUNTER — HOSPITAL ENCOUNTER (EMERGENCY)
Age: 28
Discharge: HOME | End: 2018-12-14
Payer: COMMERCIAL

## 2018-12-14 VITALS
HEART RATE: 101 BPM | OXYGEN SATURATION: 98 % | DIASTOLIC BLOOD PRESSURE: 73 MMHG | RESPIRATION RATE: 16 BRPM | SYSTOLIC BLOOD PRESSURE: 105 MMHG | TEMPERATURE: 98.1 F

## 2018-12-14 DIAGNOSIS — Z76.5 DRUG-SEEKING BEHAVIOR: ICD-10-CM

## 2018-12-14 DIAGNOSIS — A60.00 GENITAL HERPES SIMPLEX, UNSPECIFIED SITE: Primary | ICD-10-CM

## 2018-12-14 PROCEDURE — 99282 EMERGENCY DEPT VISIT SF MDM: CPT

## 2018-12-14 ASSESSMENT — PAIN SCALES - GENERAL: PAINLEVEL_OUTOF10: 10

## 2018-12-14 ASSESSMENT — PAIN DESCRIPTION - PAIN TYPE: TYPE: ACUTE PAIN

## 2018-12-14 NOTE — ED PROVIDER NOTES
herpes complicating pregnancy in antepartum condition 2015    High risk pregnancy due to maternal drug abuse 2015    HSV-2 (herpes simplex virus 2) infection Last Episode 4-16    IBS (irritable bowel syndrome)     Panic attacks     Pelvic pain in female     Pneumonia Last Episode In [address]    PTSD (post-traumatic stress disorder)     \"They Dx. Me With PTSD After My Mom Passed Away When I Was 16\"    Restless leg     Supervision of other high-risk pregnancy(V23.89) 2015    Teeth missing     Upper And Lower    UTI (urinary tract infection) In Past    No Current Symptoms    Wears partial dentures     Upper     Past Surgical History:   Procedure Laterality Date    ABDOMINAL EXPLORATION SURGERY       SECTION  1-6-15     SECTION      DENTAL SURGERY      Teeth Extracted In Past    ENDOMETRIAL ABLATION  9-15, 2000 Or     HYSTERECTOMY  2016    Total laparoscopic hysterectomy, BSO, cysto    HYSTERECTOMY, TOTAL ABDOMINAL         CURRENT MEDICATIONS    Current Outpatient Rx   Medication Sig Dispense Refill    estradiol (ESTRACE) 1 MG tablet   11    valACYclovir (VALTREX) 500 MG tablet Take 1,000 mg by mouth daily   3    acyclovir (ZOVIRAX) 5 % ointment Apply topically every 3 hours. 1 Tube 1    lidocaine (XYLOCAINE) 5 % ointment Apply topically as needed. 1 Tube 1    oxyCODONE-acetaminophen (PERCOCET) 5-325 MG per tablet Take 1 tablet by mouth every 6 hours as needed for Pain for up to 3 days. Intended supply: 3 days. Take lowest dose possible to manage pain.  7 tablet 0    dicyclomine (BENTYL) 10 MG capsule Take 2 capsules by mouth every 6 hours as needed (cramps) 20 capsule 0    ibuprofen (ADVIL;MOTRIN) 800 MG tablet Take 1 tablet by mouth every 6 hours as needed for Pain 120 tablet 3    acetaminophen (AMINOFEN) 325 MG tablet Take 2 tablets by mouth every 6 hours as needed for Pain 120 tablet 3    albuterol sulfate  (90 Base) MCG/ACT inhaler Inhale 2 information if she has yet to find time to be able to follow-up and call them to make appointments. I discussed with patient that I felt as though Percocet was not an appropriate management for her general herpes and I'll not be prescribing her any additional pain medication today especially given her red flags in her chart with regards to concern for drug abuse. Patient reports that she'll be unable to go to work if she does not have any pain medication. I recommended that she call Dr. Александр Bolton office to see if they will schedule her an appointment which she leaves today. Patient was written a work note however patient did leave the ER without her discharge paperwork and prior to receiving this note as she was frustrated that she did not get any pain medication. Differential diagnosis: Urethritis, prostatitis/PID, Zybc-Pdec-Iubedw syndrome, STI, Syphilis, HIV, arthritis,       Clinical  IMPRESSION    1. Genital herpes simplex, unspecified site    2. Drug-seeking behavior          Comment: Please note this report has been produced using speech recognition software and may contain errors related to that system including errors in grammar, punctuation, and spelling, as well as words and phrases that may be inappropriate. If there are any questions or concerns please feel free to contact the dictating provider for clarification.      Stanley Goel, JASMIN - CNP  12/14/18 2275

## 2018-12-14 NOTE — LETTER
[facility] Emergency Department  [facility] [address] [address]  Phone: [phone]  Fax: [phone]               December 14, 2018    Patient: Srinath Beltran   YOB: 1990   Date of Visit: 12/14/2018       To Whom It May Concern:    Shira Sterling was seen and treated in our emergency department on 12/14/2018. She may return to work on 12/15/2018.       Sincerely,       JASMIN Link CNP         Signature:__________________________________

## 2019-01-29 ENCOUNTER — HOSPITAL ENCOUNTER (OUTPATIENT)
Dept: CT IMAGING | Age: 29
Discharge: HOME | End: 2019-01-29
Payer: COMMERCIAL

## 2019-01-29 ENCOUNTER — HOSPITAL ENCOUNTER (OUTPATIENT)
Age: 29
Discharge: HOME | End: 2019-01-29
Payer: COMMERCIAL

## 2019-01-29 DIAGNOSIS — R31.0 CLOT HEMATURIA: ICD-10-CM

## 2019-01-29 LAB
ANION GAP SERPL CALCULATED.3IONS-SCNC: 12 MMOL/L (ref 4–16)
BUN BLDV-MCNC: 11 MG/DL (ref 6–23)
CALCIUM SERPL-MCNC: 9.5 MG/DL (ref 8.3–10.6)
CHLORIDE BLD-SCNC: 101 MMOL/L (ref 99–110)
CO2: 27 MMOL/L (ref 21–32)
CREAT SERPL-MCNC: 0.7 MG/DL (ref 0.6–1.1)
GFR AFRICAN AMERICAN: >60 ML/MIN/1.73M2
GFR AFRICAN AMERICAN: >60 ML/MIN/1.73M2
GFR NON-AFRICAN AMERICAN: >60 ML/MIN/1.73M2
GFR NON-AFRICAN AMERICAN: >60 ML/MIN/1.73M2
GLUCOSE BLD-MCNC: 107 MG/DL (ref 70–99)
POC CREATININE: 0.8 MG/DL (ref 0.6–1.1)
POTASSIUM SERPL-SCNC: 4.2 MMOL/L (ref 3.5–5.1)
SODIUM BLD-SCNC: 140 MMOL/L (ref 135–145)

## 2019-01-29 PROCEDURE — 80048 BASIC METABOLIC PNL TOTAL CA: CPT

## 2019-01-29 PROCEDURE — 74178 CT ABD&PLV WO CNTR FLWD CNTR: CPT

## 2019-01-29 PROCEDURE — 36415 COLL VENOUS BLD VENIPUNCTURE: CPT

## 2019-01-29 PROCEDURE — 6360000004 HC RX CONTRAST MEDICATION: Performed by: UROLOGY

## 2019-01-29 RX ADMIN — IOPAMIDOL 75 ML: 755 INJECTION, SOLUTION INTRAVENOUS at 09:38

## 2019-03-25 ENCOUNTER — HOSPITAL ENCOUNTER (EMERGENCY)
Age: 29
Discharge: HOME | End: 2019-03-25
Payer: COMMERCIAL

## 2019-03-25 VITALS
SYSTOLIC BLOOD PRESSURE: 110 MMHG | DIASTOLIC BLOOD PRESSURE: 90 MMHG | WEIGHT: 108 LBS | HEIGHT: 62 IN | HEART RATE: 106 BPM | BODY MASS INDEX: 19.88 KG/M2 | TEMPERATURE: 98.3 F | OXYGEN SATURATION: 99 % | RESPIRATION RATE: 17 BRPM

## 2019-03-25 DIAGNOSIS — K08.89 PAIN, DENTAL: Primary | ICD-10-CM

## 2019-03-25 PROCEDURE — 99282 EMERGENCY DEPT VISIT SF MDM: CPT

## 2019-03-25 PROCEDURE — 6370000000 HC RX 637 (ALT 250 FOR IP): Performed by: PHYSICIAN ASSISTANT

## 2019-03-25 RX ORDER — OXYCODONE HYDROCHLORIDE AND ACETAMINOPHEN 5; 325 MG/1; MG/1
1 TABLET ORAL ONCE
Status: COMPLETED | OUTPATIENT
Start: 2019-03-25 | End: 2019-03-25

## 2019-03-25 RX ORDER — ONDANSETRON 4 MG/1
4 TABLET, ORALLY DISINTEGRATING ORAL ONCE
Status: COMPLETED | OUTPATIENT
Start: 2019-03-25 | End: 2019-03-25

## 2019-03-25 RX ORDER — NAPROXEN 500 MG/1
500 TABLET ORAL 2 TIMES DAILY
Qty: 60 TABLET | Refills: 0 | Status: SHIPPED | OUTPATIENT
Start: 2019-03-25 | End: 2019-04-19

## 2019-03-25 RX ORDER — ONDANSETRON 4 MG/1
4 TABLET, ORALLY DISINTEGRATING ORAL EVERY 6 HOURS
Qty: 10 TABLET | Refills: 0 | Status: SHIPPED | OUTPATIENT
Start: 2019-03-25 | End: 2019-04-19

## 2019-03-25 RX ADMIN — ONDANSETRON 4 MG: 4 TABLET, ORALLY DISINTEGRATING ORAL at 12:41

## 2019-03-25 RX ADMIN — LIDOCAINE HYDROCHLORIDE 15 ML: 20 SOLUTION ORAL; TOPICAL at 12:31

## 2019-03-25 RX ADMIN — OXYCODONE AND ACETAMINOPHEN 1 TABLET: 5; 325 TABLET ORAL at 12:31

## 2019-03-25 ASSESSMENT — PAIN DESCRIPTION - LOCATION: LOCATION: TEETH

## 2019-03-25 ASSESSMENT — PAIN DESCRIPTION - PAIN TYPE: TYPE: ACUTE PAIN

## 2019-03-25 ASSESSMENT — PAIN SCALES - GENERAL
PAINLEVEL_OUTOF10: 10
PAINLEVEL_OUTOF10: 10

## 2019-03-25 ASSESSMENT — PAIN DESCRIPTION - ORIENTATION: ORIENTATION: RIGHT;UPPER

## 2019-03-25 ASSESSMENT — PAIN DESCRIPTION - DESCRIPTORS: DESCRIPTORS: ACHING;CONSTANT;DISCOMFORT

## 2019-04-19 ENCOUNTER — HOSPITAL ENCOUNTER (EMERGENCY)
Age: 29
Discharge: HOME | End: 2019-04-19
Payer: COMMERCIAL

## 2019-04-19 VITALS
SYSTOLIC BLOOD PRESSURE: 117 MMHG | DIASTOLIC BLOOD PRESSURE: 78 MMHG | OXYGEN SATURATION: 100 % | WEIGHT: 110 LBS | HEART RATE: 81 BPM | BODY MASS INDEX: 20.24 KG/M2 | RESPIRATION RATE: 17 BRPM | TEMPERATURE: 97.8 F | HEIGHT: 62 IN

## 2019-04-19 DIAGNOSIS — A60.00 GENITAL HERPES SIMPLEX, UNSPECIFIED SITE: Primary | ICD-10-CM

## 2019-04-19 DIAGNOSIS — N76.0 VAGINITIS AND VULVOVAGINITIS: ICD-10-CM

## 2019-04-19 LAB
BACTERIA: NEGATIVE /HPF
BILIRUBIN URINE: NEGATIVE MG/DL
BLOOD, URINE: NEGATIVE
CLARITY: ABNORMAL
COLOR: YELLOW
GLUCOSE, URINE: NEGATIVE MG/DL
KETONES, URINE: NEGATIVE MG/DL
LEUKOCYTE ESTERASE, URINE: NEGATIVE
Lab: ABNORMAL
MUCUS: ABNORMAL HPF
NITRITE URINE, QUANTITATIVE: NEGATIVE
PH, URINE: 7 (ref 5–8)
PROTEIN UA: NEGATIVE MG/DL
RBC URINE: <1 /HPF (ref 0–6)
SPECIFIC GRAVITY UA: 1 (ref 1–1.03)
SPECIMEN: ABNORMAL
SQUAMOUS EPITHELIAL: 21 /HPF
TRICHOMONAS: ABNORMAL /HPF
UROBILINOGEN, URINE: NORMAL MG/DL (ref 0.2–1)
WBC UA: 1 /HPF (ref 0–5)
WET PREP: ABNORMAL

## 2019-04-19 PROCEDURE — 99283 EMERGENCY DEPT VISIT LOW MDM: CPT

## 2019-04-19 PROCEDURE — 87491 CHLMYD TRACH DNA AMP PROBE: CPT

## 2019-04-19 PROCEDURE — 87220 TISSUE EXAM FOR FUNGI: CPT

## 2019-04-19 PROCEDURE — 87591 N.GONORRHOEAE DNA AMP PROB: CPT

## 2019-04-19 PROCEDURE — 81001 URINALYSIS AUTO W/SCOPE: CPT

## 2019-04-19 RX ORDER — OXYCODONE HYDROCHLORIDE AND ACETAMINOPHEN 5; 325 MG/1; MG/1
1 TABLET ORAL EVERY 6 HOURS PRN
Qty: 12 TABLET | Refills: 0 | Status: SHIPPED | OUTPATIENT
Start: 2019-04-19 | End: 2019-04-22

## 2019-04-19 RX ORDER — ACYCLOVIR 200 MG/1
400 CAPSULE ORAL 3 TIMES DAILY
Qty: 30 CAPSULE | Refills: 0 | Status: SHIPPED | OUTPATIENT
Start: 2019-04-19 | End: 2019-04-24

## 2019-04-19 RX ORDER — LIDOCAINE 50 MG/G
OINTMENT TOPICAL
Qty: 10 G | Refills: 0 | Status: SHIPPED | OUTPATIENT
Start: 2019-04-19 | End: 2019-10-25

## 2019-04-19 ASSESSMENT — PAIN SCALES - GENERAL: PAINLEVEL_OUTOF10: 10

## 2019-04-19 ASSESSMENT — PAIN DESCRIPTION - LOCATION: LOCATION: VAGINA

## 2019-04-19 NOTE — ED NOTES
Patient has a history of herpes  Patient feels that she is having an outbreak     Inge Subramanian, CHET  04/19/19 [id number]

## 2019-04-19 NOTE — ED PROVIDER NOTES
eMERGENCY dEPARTMENT eNCOUnter      PCP: No primary care provider on file. CHIEF COMPLAINT    Chief Complaint   Patient presents with    Vaginitis       HPI    Arben rKaft is a 29 y.o. female who presents with concerns for vaginal pain. Patient has a history of herpes feels that she is having a flareup right on the inside of her vaginal wall. Patient met she's had some discharge for the last 2 days therefore she was taking acyclovir but she only has one more day of the prescription. Admits that she has been exposed to unprotected sexual intercourse. Admits to history of STDs in the past. Denies any itching does have plain and bleeding. Denies any urinary symptoms. Patient denies any concern for pregnancy given history of hysterectomy. REVIEW OF SYSTEMS    General: No fevers, chills  GI: No Abdominal pain, vomiting  : See HPI above. Denies urinary symptoms. Skin: No new rashes  Musculoskeletal: No Arthralgias, No flank or back pain. All other review of systems are negative  See HPI and nursing notes for additional information     PAST MEDICAL & SURGICAL HISTORY    Past Medical History:   Diagnosis Date    Anxiety     Anxiety attack     Asthma     No Pulmonologist At This Time    Bipolar disorder ([facility] [address]     Bronchitis Last Episode In 2007    Chronic back pain     Depression     Endometriosis     Genital herpes complicating pregnancy in antepartum condition 1/6/2015    High risk pregnancy due to maternal drug abuse 1/6/2015    HSV-2 (herpes simplex virus 2) infection Last Episode 4-16    IBS (irritable bowel syndrome)     Panic attacks     Pelvic pain in female     Pneumonia Last Episode In 1996 Or 1997    PTSD (post-traumatic stress disorder)     \"They Dx.  Me With PTSD After My Mom Passed Away When I Was 16\"    Restless leg     Supervision of other high-risk pregnancy(V23.89) 1/6/2015    Teeth missing     Upper And Lower    UTI (urinary tract infection) In Past    No Current Symptoms    Wears partial dentures     Upper     Past Surgical History:   Procedure Laterality Date    ABDOMINAL EXPLORATION SURGERY       SECTION  1-6-15     SECTION      DENTAL SURGERY      Teeth Extracted In Past    ENDOMETRIAL ABLATION  9-15, 2000 Or     HYSTERECTOMY  2016    Total laparoscopic hysterectomy, BSO, cysto    HYSTERECTOMY, TOTAL ABDOMINAL         CURRENT MEDICATIONS    Current Outpatient Rx   Medication Sig Dispense Refill    oxyCODONE-acetaminophen (PERCOCET) 5-325 MG per tablet Take 1 tablet by mouth every 6 hours as needed for Pain for up to 3 days. Intended supply: 3 days. Take lowest dose possible to manage pain 12 tablet 0    acyclovir (ZOVIRAX) 200 MG capsule Take 2 capsules by mouth 3 times daily for 5 days 30 capsule 0    lidocaine (XYLOCAINE) 5 % ointment Apply topically to area of pain 1-2 per day.  10 g 0    estradiol (ESTRACE) 1 MG tablet   11    albuterol sulfate  (90 Base) MCG/ACT inhaler Inhale 2 puffs into the lungs every 6 hours as needed for Wheezing or Shortness of Breath (or cough) 1 Inhaler 0       ALLERGIES    Allergies   Allergen Reactions    Latex Rash    Bactrim [Sulfamethoxazole-Trimethoprim] Nausea And Vomiting    Ciprofloxacin Nausea And Vomiting    Codeine Itching and Nausea And Vomiting    Toradol [Ketorolac Tromethamine] Itching, Nausea And Vomiting and Rash     \"Migraines\"    Tramadol Itching, Nausea And Vomiting and Rash     \"Migraines\"    Vicodin [Hydrocodone-Acetaminophen] Itching and Nausea And Vomiting     \"Anxiety Gets Really Bad\"    Vistaril [Hydroxyzine Hcl] Itching     \"I Pass Out\"    Bactrim [Sulfamethoxazole-Trimethoprim]     Ciprofloxacin     Codeine     Toradol [Ketorolac Tromethamine]     Tramadol     Vicodin [Hydrocodone-Acetaminophen]     Vistaril [Hydroxyzine Hcl]        FAMILY AND SOCIAL HISTORY    Family History   Problem Relation Age of Onset    Early Death Mother         45 Or 44    Arthritis Mother     High Blood Pressure Mother     Substance Abuse Father         \"Crackhead\"    Mental Illness Sister         \"Split Personality, Anxiety\"    High Blood Pressure Sister     Heart Disease Brother     Depression Sister     Mental Illness Sister         Anxiety, PTSD     Social History     Socioeconomic History    Marital status: Single     Spouse name: None    Number of children: None    Years of education: None    Highest education level: None   Occupational History    None   Social Needs    Financial resource strain: None    Food insecurity:     Worry: None     Inability: None    Transportation needs:     Medical: None     Non-medical: None   Tobacco Use    Smoking status: Current Every Day Smoker     Packs/day: 0.50     Years: 11.00     Pack years: 5.50     Types: Cigarettes    Smokeless tobacco: Never Used   Substance and Sexual Activity    Alcohol use: Not Currently     Comment: occ    Drug use: Yes     Types: Marijuana     Comment: \"Smoke Marijuana Maybe Once A Month\"    Sexual activity: Not Currently   Lifestyle    Physical activity:     Days per week: None     Minutes per session: None    Stress: None   Relationships    Social connections:     Talks on phone: None     Gets together: None     Attends Yazidi service: None     Active member of club or organization: None     Attends meetings of clubs or organizations: None     Relationship status: None    Intimate partner violence:     Fear of current or ex partner: None     Emotionally abused: None     Physically abused: None     Forced sexual activity: None   Other Topics Concern    None   Social History Narrative    ** Merged History Encounter **            PHYSICAL EXAM    VITAL SIGNS: /78   Pulse 81   Temp 97.8 °F (36.6 °C) (Oral)   Resp 17   Ht 5' 1.5\" (1.562 m)   Wt 110 lb (49.9 kg)   LMP  (Approximate)   SpO2 100%   BMI 20.45 kg/m²    Constitutional:  Well-developed,  appears comfortable. discharge therefore agreed to wet prep and gonorrhea testing, pelvic exam completed-02 small lesions. Patient also had a urinalysis completed but does not want to wait for results. We discussed options of treatment. She would like treatment for herpetic lesions, was given pain control, acyclovir she been taking just was she's had leftover not the appropriate dosage. Does not want to be treated for STDs at this time and was anything came back positive. I feel comfortable with this as patient did not have a large amount of discharge in the vaginal vault. Patient will be discharged in stable condition with outpatient follow-up to her ObGyn and instructed her precautions provided today. Patient comfortable with this workup and plan. Clinical  IMPRESSION    1. Genital herpes simplex, unspecified site    2. Vaginitis and vulvovaginitis            Followup with health department - recommend complete STD panel. (discussed today)    Pt was instructed to inform all sexual partners of the need for evaluation/treatment. Diagnosis and plan discussed in detail with patient who understands and agrees. Patient agrees to return emergency department if symptoms worsen or any new symptoms develop. Comment: Please note this report has been produced using speech recognition software and may contain errors related to that system including errors in grammar, punctuation, and spelling, as well as words and phrases that may be inappropriate. If there are any questions or concerns please feel free to contact the dictating provider for clarification.         Emil Peters PA-C  04/19/19 [id number]

## 2019-04-21 LAB
CHLAMYDIA TRACHOMATIS AMPLIFIED DET: NEGATIVE
CHLAMYDIA TRACHOMATIS AMPLIFIED DET: NORMAL
N GONORRHOEAE AMPLIFIED DET: NEGATIVE
N GONORRHOEAE AMPLIFIED DET: NORMAL

## 2019-04-24 ENCOUNTER — HOSPITAL ENCOUNTER (EMERGENCY)
Age: 29
Discharge: HOME | End: 2019-04-24
Payer: COMMERCIAL

## 2019-04-24 VITALS
TEMPERATURE: 97.9 F | DIASTOLIC BLOOD PRESSURE: 72 MMHG | RESPIRATION RATE: 16 BRPM | OXYGEN SATURATION: 100 % | SYSTOLIC BLOOD PRESSURE: 109 MMHG | HEART RATE: 87 BPM

## 2019-04-24 DIAGNOSIS — N94.819 VULVODYNIA: Primary | ICD-10-CM

## 2019-04-24 DIAGNOSIS — A60.00 GENITAL HERPES SIMPLEX, UNSPECIFIED SITE: ICD-10-CM

## 2019-04-24 PROCEDURE — 99282 EMERGENCY DEPT VISIT SF MDM: CPT

## 2019-04-24 RX ORDER — OXYCODONE HYDROCHLORIDE AND ACETAMINOPHEN 5; 325 MG/1; MG/1
1 TABLET ORAL EVERY 4 HOURS PRN
Qty: 10 TABLET | Refills: 0 | Status: SHIPPED | OUTPATIENT
Start: 2019-04-24 | End: 2019-04-27

## 2019-04-24 ASSESSMENT — PAIN DESCRIPTION - DESCRIPTORS: DESCRIPTORS: CONSTANT

## 2019-04-24 ASSESSMENT — PAIN DESCRIPTION - LOCATION: LOCATION: VAGINA

## 2019-04-24 ASSESSMENT — PAIN DESCRIPTION - PAIN TYPE: TYPE: ACUTE PAIN

## 2019-04-24 NOTE — ED PROVIDER NOTES
eMERGENCY dEPARTMENT eNCOUnter      PCP: No primary care provider on file. CHIEF COMPLAINT    Chief Complaint   Patient presents with    Blister     patient states that she had herpes and the pharmacy is out of the medication she is prescribed       HPI    Faiza Ayala is a 29 y.o. female who presents with pain secondary to general herpes. Onset several days. Context is patient states she has known genital herpes and had recent outbreak. She states she is under increased stress due deaths in the family. No new nature or severity of symptoms recently. Patient states she was seen in [address] [place] [address] 2 days ago at which time she was given Valtrex and topical creams-states topical creams on back order and due to come in tomorrow to the pharmacy. She is here today requesting pain medication as pain is making difficult to walk and patient has 2 shifts at work over the next 2 days and is concerned about pain. She adamantly denies urinary symptoms, vaginal bleeding. Denies pregnancy. Denies concern for other STD. REVIEW OF SYSTEMS    General: No fevers, chills  GI: No Abdominal pain, vomiting  : See HPI above. Denies urinary symptoms. Skin: No new rashes  Musculoskeletal: No Arthralgias, No flank or back pain.     All other review of systems are negative  See HPI and nursing notes for additional information     PAST MEDICAL & SURGICAL HISTORY    Past Medical History:   Diagnosis Date    Anxiety     Anxiety attack     Asthma     No Pulmonologist At This Time    Bipolar disorder ([facility] [address]     Bronchitis Last Episode In 2007    Chronic back pain     Depression     Endometriosis     Genital herpes complicating pregnancy in antepartum condition 1/6/2015    High risk pregnancy due to maternal drug abuse 1/6/2015    HSV-2 (herpes simplex virus 2) infection Last Episode 4-16    IBS (irritable bowel syndrome)     Panic attacks     Pelvic pain in female     Pneumonia Last Episode In [address]  PTSD (post-traumatic stress disorder)     [place] Dx. Me With PTSD After My Mom Passed Away When I Was 16\"    Restless leg     Supervision of other high-risk pregnancy(V23.89) 2015    Teeth missing     Upper And Lower    UTI (urinary tract infection) In Past    No Current Symptoms    Wears partial dentures     Upper     Past Surgical History:   Procedure Laterality Date    ABDOMINAL EXPLORATION SURGERY       SECTION  1-6-15     SECTION      DENTAL SURGERY      Teeth Extracted In Past    ENDOMETRIAL ABLATION  9-15,  Or     HYSTERECTOMY  2016    Total laparoscopic hysterectomy, BSO, cysto    HYSTERECTOMY, TOTAL ABDOMINAL         CURRENT MEDICATIONS    Current Outpatient Rx   Medication Sig Dispense Refill    oxyCODONE-acetaminophen (PERCOCET) 5-325 MG per tablet Take 1 tablet by mouth every 4 hours as needed for Pain for up to 3 days. 10 tablet 0    acyclovir (ZOVIRAX) 200 MG capsule Take 2 capsules by mouth 3 times daily for 5 days 30 capsule 0    lidocaine (XYLOCAINE) 5 % ointment Apply topically to area of pain 1-2 per day.  10 g 0    estradiol (ESTRACE) 1 MG tablet   11    albuterol sulfate  (90 Base) MCG/ACT inhaler Inhale 2 puffs into the lungs every 6 hours as needed for Wheezing or Shortness of Breath (or cough) 1 Inhaler 0       ALLERGIES    Allergies   Allergen Reactions    Latex Rash    Bactrim [Sulfamethoxazole-Trimethoprim] Nausea And Vomiting    Ciprofloxacin Nausea And Vomiting    Codeine Itching and Nausea And Vomiting    Toradol [Ketorolac Tromethamine] Itching, Nausea And Vomiting and Rash     \"Migraines\"    Tramadol Itching, Nausea And Vomiting and Rash     \"Migraines\"    Vicodin [Hydrocodone-Acetaminophen] Itching and Nausea And Vomiting     \"Anxiety Gets Really Bad\"    Vistaril [Hydroxyzine Hcl] Itching     \"I Pass Out\"    Bactrim [Sulfamethoxazole-Trimethoprim]     Ciprofloxacin     Codeine     Toradol [Ketorolac Tromethamine]

## 2019-04-24 NOTE — ED TRIAGE NOTES
Pt states she was dx with HSV2, she was seen previously for a knot on the R side of her vagina several days ago, pt states she attempted to see her OB but was informed he is out of the office this week. Pt states she now has pain to the L side of her vagina which Is making it difficult for her to walk.

## 2019-04-24 NOTE — ED NOTES
Discharge instructions reviewed with patient. PT verbalizes understanding. All questions answered. Follow up instructions given. PT denies any further needs at this time.       Angelita Hankins LPN  [phone]

## 2019-05-10 ENCOUNTER — HOSPITAL ENCOUNTER (EMERGENCY)
Age: 29
Discharge: HOME | End: 2019-05-10
Payer: COMMERCIAL

## 2019-05-10 VITALS
SYSTOLIC BLOOD PRESSURE: 116 MMHG | TEMPERATURE: 98.4 F | DIASTOLIC BLOOD PRESSURE: 74 MMHG | BODY MASS INDEX: 21.14 KG/M2 | RESPIRATION RATE: 16 BRPM | HEIGHT: 61 IN | HEART RATE: 86 BPM | WEIGHT: 112 LBS | OXYGEN SATURATION: 98 %

## 2019-05-10 DIAGNOSIS — B00.9 HSV-2 (HERPES SIMPLEX VIRUS 2) INFECTION: Primary | ICD-10-CM

## 2019-05-10 PROCEDURE — 99283 EMERGENCY DEPT VISIT LOW MDM: CPT

## 2019-05-10 RX ORDER — OXYCODONE HYDROCHLORIDE AND ACETAMINOPHEN 5; 325 MG/1; MG/1
1 TABLET ORAL EVERY 6 HOURS PRN
Qty: 9 TABLET | Refills: 0 | Status: SHIPPED | OUTPATIENT
Start: 2019-05-10 | End: 2019-05-13

## 2019-05-10 ASSESSMENT — PAIN DESCRIPTION - FREQUENCY: FREQUENCY: CONTINUOUS

## 2019-05-10 ASSESSMENT — PAIN DESCRIPTION - LOCATION: LOCATION: VAGINA

## 2019-05-10 ASSESSMENT — PAIN DESCRIPTION - DESCRIPTORS: DESCRIPTORS: PINS AND NEEDLES

## 2019-05-10 ASSESSMENT — PAIN SCALES - GENERAL: PAINLEVEL_OUTOF10: 8

## 2019-05-10 ASSESSMENT — PAIN DESCRIPTION - PAIN TYPE: TYPE: ACUTE PAIN

## 2019-05-10 NOTE — ED PROVIDER NOTES
eMERGENCY dEPARTMENT eNCOUnter      PCP: No primary care provider on file. CHIEF COMPLAINT    Chief Complaint   Patient presents with    Vaginal Pain       HPI    Nasra Baker is a 29 y.o. female who presents to the emergency Department today with continued perineal/vulvar/vaginal pain. Patient has had recurring issues of herpes simplex 2 infections to the genital area. She states that she's had an outbreak within the last 2 weeks. She states all her OB yesterday and has been on Valtrex 1000 mg twice daily and lidocaine gel without significant improvement. She is denying any discharge. She states that she feels that the outbreak has worsened over the last several days despite being on the antiviral medication. States that she's had recurrent episodes, denies being immunocompromised. She is here for pain control. Patient is allergic to every medication other than Percocet. She denies any urinary symptoms. Denies pregnancy. REVIEW OF SYSTEMS    At least 4 systems reviewed and otherwise acutely negative except as in the [address] See HPI and nursing notes for additional information     PAST MEDICAL AND SURGICAL HISTORY    Past Medical History:   Diagnosis Date    Anxiety     Anxiety attack     Asthma     No Pulmonologist At This Time    Bipolar disorder ([facility] [address]     Bronchitis Last Episode In 2007    Chronic back pain     Depression     Endometriosis     Genital herpes complicating pregnancy in antepartum condition 1/6/2015    High risk pregnancy due to maternal drug abuse 1/6/2015    HSV-2 (herpes simplex virus 2) infection Last Episode 4-16    IBS (irritable bowel syndrome)     Panic attacks     Pelvic pain in female     Pneumonia Last Episode In 1996 Or 1997    PTSD (post-traumatic stress disorder)     \"They Dx.  Me With PTSD After My Mom Passed Away When I Was 16\"    Restless leg     Supervision of other high-risk pregnancy(V23.89) 1/6/2015    Teeth missing     Upper And Lower    UTI (urinary tract infection) In Past    No Current Symptoms    Wears partial dentures     Upper     Past Surgical History:   Procedure Laterality Date    ABDOMINAL EXPLORATION SURGERY       SECTION  15     SECTION      DENTAL SURGERY      Teeth Extracted In Past    ENDOMETRIAL ABLATION  9-15,  Or     HYSTERECTOMY  2016    Total laparoscopic hysterectomy, BSO, cysto    HYSTERECTOMY, TOTAL ABDOMINAL         CURRENT MEDICATIONS    Current Outpatient Rx   Medication Sig Dispense Refill    oxyCODONE-acetaminophen (PERCOCET) 5-325 MG per tablet Take 1 tablet by mouth every 6 hours as needed for Pain for up to 3 days. Intended supply: 3 days. Take lowest dose possible to manage pain 9 tablet 0    lidocaine (XYLOCAINE) 5 % ointment Apply topically to area of pain 1-2 per day.  10 g 0    estradiol (ESTRACE) 1 MG tablet   11    albuterol sulfate  (90 Base) MCG/ACT inhaler Inhale 2 puffs into the lungs every 6 hours as needed for Wheezing or Shortness of Breath (or cough) 1 Inhaler 0       ALLERGIES    Allergies   Allergen Reactions    Latex Rash    Bactrim [Sulfamethoxazole-Trimethoprim] Nausea And Vomiting    Ciprofloxacin Nausea And Vomiting    Codeine Itching and Nausea And Vomiting    Toradol [Ketorolac Tromethamine] Itching, Nausea And Vomiting and Rash     \"Migraines\"    Tramadol Itching, Nausea And Vomiting and Rash     \"Migraines\"    Vicodin [Hydrocodone-Acetaminophen] Itching and Nausea And Vomiting     \"Anxiety Gets Really Bad\"    Vistaril [Hydroxyzine Hcl] Itching     \"I Pass Out\"    Bactrim [Sulfamethoxazole-Trimethoprim]     Ciprofloxacin     Codeine     Toradol [Ketorolac Tromethamine]     Tramadol     Vicodin [Hydrocodone-Acetaminophen]     Vistaril [Hydroxyzine Hcl]        SOCIAL AND FAMILY HISTORY    Social History     Socioeconomic History    Marital status: Single     Spouse name: None    Number of children: None    Years of education: None    Highest education level: None   Occupational History    None   Social Needs    Financial resource strain: None    Food insecurity:     Worry: None     Inability: None    Transportation needs:     Medical: None     Non-medical: None   Tobacco Use    Smoking status: Current Every Day Smoker     Packs/day: 0.50     Years: 11.00     Pack years: 5.50     Types: Cigarettes    Smokeless tobacco: Never Used   Substance and Sexual Activity    Alcohol use: Not Currently     Comment: occ    Drug use: Yes     Types: Marijuana     Comment: \"Smoke Marijuana Maybe Once A Month\"    Sexual activity: Not Currently   Lifestyle    Physical activity:     Days per week: None     Minutes per session: None    Stress: None   Relationships    Social connections:     Talks on phone: None     Gets together: None     Attends Worship service: None     Active member of club or organization: None     Attends meetings of clubs or organizations: None     Relationship status: None    Intimate partner violence:     Fear of current or ex partner: None     Emotionally abused: None     Physically abused: None     Forced sexual activity: None   Other Topics Concern    None   Social History Narrative    ** Merged History Encounter **          Family History   Problem Relation Age of Onset    Early Death Mother         45 Or 44    Arthritis Mother     High Blood Pressure Mother     Substance Abuse Father         \"Crackhead\"    Mental Illness Sister         \"Split Personality, Anxiety\"    High Blood Pressure Sister     Heart Disease Brother     Depression Sister     Mental Illness Sister         Anxiety, PTSD         PHYSICAL EXAM    VITAL SIGNS: /74   Pulse 86   Temp 98.4 °F (36.9 °C) (Oral)   Resp 16   Ht 5' 1\" (1.549 m)   Wt 112 lb (50.8 kg)   LMP  (Approximate)   SpO2 98%   BMI 21.16 kg/m²    Constitutional:  Well developed, Well nourished  HENT:  Normocephalic, Atraumatic, PERRL. EOMI.   Sclera clear.Conjunctiva normal, No discharge. Neck/Lymphatics: supple, no JVD, no swollen nodes  Cardiovascular:  Rate regular, normal Rhythm,  no murmurs/rubs/gallops. No carotid bruits or murmurs heard in carotids. No JVD  Respiratory:  Nonlabored breathing. Normal breath sounds, No wheezing  Abdomen: Bowel sounds normal, Soft, No tenderness, no masses. Musculoskeletal:    There is no edema, asymmetry, or calf / thigh tenderness bilaterally. No cyanosis. No cool or pale-appearing limb. Distal cap refill and pulses intact bilateral upper and lower extremities  Bilateral upper and lower extremity ROM intact without pain or obvious deficit  Integument:  On evaluation of the vulvar issue there are several small circular ulcerations with erythematous bases to the volar/poor oral area and labia minora. No signs of superimposed infection. No bleeding, no friable skin. No obvious vaginal discharge. Neurologic: Alert & oriented , No focal deficits noted. Cranial nerves II through XII grossly intact. Normal gross motor coordination & motor strength bilateral upper and lower extremities  Sensation intact. Psychiatric:  Affect normal, Mood normal.     ED COURSE & MEDICAL DECISION MAKING     Patient presents as above. Patient is presenting with a outbreak of the herpes simplex virus. States that she's been on Valtrex thousand milligrams twice daily for the last week without significant improvement of her symptoms. She's been on a lidocaine ointment. She states this helps minimally. She is essentially here for pain control stating the chest work tomorrow and is unable to stand the burning and pain. On evaluation there is obvious moderate outbreak of herpes simplex to virus. No sign superimposed infection. No vaginal discharge. No significant abdominal pain. Patient does have significant allergies, did have a  conversation with the patient about these allergies.   We will provide her a short course of pain medication. Advised him follow-up with ObGyn. Diagnosis and plan discussed in detail with patient who understands and agrees. Patient agrees to return emergency department if symptoms worsen or any new symptoms develop. Vital signs and nursing notes reviewed during ED course. Clinical  IMPRESSION    1. HSV-2 (herpes simplex virus 2) infection      Comment: Please note this report has been produced using speech recognition software and may contain errors related to that system including errors in grammar, punctuation, and spelling, as well as words and phrases that may be inappropriate. If there are any questions or concerns please feel free to contact the dictating provider for clarification.           Mk Reyes [address], PA  05/10/19 1053

## 2019-05-10 NOTE — ED NOTES
Discharge instructions given, pt voiced understanding. Pt denies further needs at this time.       Ruth Fuller RN  05/10/19 [id number]

## 2019-06-06 ENCOUNTER — HOSPITAL ENCOUNTER (EMERGENCY)
Age: 29
Discharge: HOME | End: 2019-06-06
Attending: EMERGENCY MEDICINE
Payer: COMMERCIAL

## 2019-06-06 ENCOUNTER — APPOINTMENT (OUTPATIENT)
Dept: CT IMAGING | Age: 29
End: 2019-06-06
Payer: COMMERCIAL

## 2019-06-06 VITALS
DIASTOLIC BLOOD PRESSURE: 78 MMHG | HEIGHT: 61 IN | WEIGHT: 112 LBS | HEART RATE: 68 BPM | OXYGEN SATURATION: 100 % | BODY MASS INDEX: 21.14 KG/M2 | SYSTOLIC BLOOD PRESSURE: 104 MMHG | RESPIRATION RATE: 19 BRPM

## 2019-06-06 DIAGNOSIS — R19.7 NAUSEA VOMITING AND DIARRHEA: Primary | ICD-10-CM

## 2019-06-06 DIAGNOSIS — R10.9 ABDOMINAL PAIN, UNSPECIFIED ABDOMINAL LOCATION: ICD-10-CM

## 2019-06-06 DIAGNOSIS — R11.2 NAUSEA VOMITING AND DIARRHEA: Primary | ICD-10-CM

## 2019-06-06 LAB
ALBUMIN SERPL-MCNC: 5.1 GM/DL (ref 3.4–5)
ALP BLD-CCNC: 78 IU/L (ref 40–129)
ALT SERPL-CCNC: 15 U/L (ref 10–40)
ANION GAP SERPL CALCULATED.3IONS-SCNC: 14 MMOL/L (ref 4–16)
AST SERPL-CCNC: 20 IU/L (ref 15–37)
BACTERIA: ABNORMAL /HPF
BASOPHILS ABSOLUTE: 0.1 K/CU MM
BASOPHILS RELATIVE PERCENT: 0.5 % (ref 0–1)
BILIRUB SERPL-MCNC: 0.4 MG/DL (ref 0–1)
BILIRUBIN URINE: NEGATIVE MG/DL
BLOOD, URINE: NEGATIVE
BUN BLDV-MCNC: 11 MG/DL (ref 6–23)
CALCIUM SERPL-MCNC: 9.7 MG/DL (ref 8.3–10.6)
CHLORIDE BLD-SCNC: 102 MMOL/L (ref 99–110)
CLARITY: CLEAR
CO2: 25 MMOL/L (ref 21–32)
COLOR: ABNORMAL
CREAT SERPL-MCNC: 0.7 MG/DL (ref 0.6–1.1)
DIFFERENTIAL TYPE: ABNORMAL
EOSINOPHILS ABSOLUTE: 0.1 K/CU MM
EOSINOPHILS RELATIVE PERCENT: 1.3 % (ref 0–3)
GFR AFRICAN AMERICAN: >60 ML/MIN/1.73M2
GFR NON-AFRICAN AMERICAN: >60 ML/MIN/1.73M2
GLUCOSE BLD-MCNC: 106 MG/DL (ref 70–99)
GLUCOSE, URINE: NEGATIVE MG/DL
HCT VFR BLD CALC: 48.4 % (ref 37–47)
HEMOGLOBIN: 16 GM/DL (ref 12.5–16)
IMMATURE NEUTROPHIL %: 0.3 % (ref 0–0.43)
KETONES, URINE: NEGATIVE MG/DL
LACTATE: 0.9 MMOL/L (ref 0.4–2)
LEUKOCYTE ESTERASE, URINE: NEGATIVE
LIPASE: 15 IU/L (ref 13–60)
LYMPHOCYTES ABSOLUTE: 1.6 K/CU MM
LYMPHOCYTES RELATIVE PERCENT: 16.2 % (ref 24–44)
MCH RBC QN AUTO: 31.6 PG (ref 27–31)
MCHC RBC AUTO-ENTMCNC: 33.1 % (ref 32–36)
MCV RBC AUTO: 95.5 FL (ref 78–100)
MONOCYTES ABSOLUTE: 1.2 K/CU MM
MONOCYTES RELATIVE PERCENT: 12.1 % (ref 0–4)
MUCUS: ABNORMAL HPF
NITRITE URINE, QUANTITATIVE: NEGATIVE
NUCLEATED RBC %: 0 %
PDW BLD-RTO: 14.2 % (ref 11.7–14.9)
PH, URINE: 6 (ref 5–8)
PLATELET # BLD: 432 K/CU MM (ref 140–440)
PMV BLD AUTO: 10.3 FL (ref 7.5–11.1)
POTASSIUM SERPL-SCNC: 4 MMOL/L (ref 3.5–5.1)
PROTEIN UA: NEGATIVE MG/DL
RBC # BLD: 5.07 M/CU MM (ref 4.2–5.4)
RBC URINE: 2 /HPF (ref 0–6)
SEGMENTED NEUTROPHILS ABSOLUTE COUNT: 6.8 K/CU MM
SEGMENTED NEUTROPHILS RELATIVE PERCENT: 69.6 % (ref 36–66)
SODIUM BLD-SCNC: 141 MMOL/L (ref 135–145)
SPECIFIC GRAVITY UA: 1.01 (ref 1–1.03)
SQUAMOUS EPITHELIAL: 5 /HPF
TOTAL IMMATURE NEUTOROPHIL: 0.03 K/CU MM
TOTAL NUCLEATED RBC: 0 K/CU MM
TOTAL PROTEIN: 7.8 GM/DL (ref 6.4–8.2)
UROBILINOGEN, URINE: NORMAL MG/DL (ref 0.2–1)
WBC # BLD: 9.7 K/CU MM (ref 4–10.5)
WBC UA: 1 /HPF (ref 0–5)

## 2019-06-06 PROCEDURE — 96375 TX/PRO/DX INJ NEW DRUG ADDON: CPT

## 2019-06-06 PROCEDURE — 2500000003 HC RX 250 WO HCPCS: Performed by: EMERGENCY MEDICINE

## 2019-06-06 PROCEDURE — 6360000004 HC RX CONTRAST MEDICATION: Performed by: EMERGENCY MEDICINE

## 2019-06-06 PROCEDURE — 99284 EMERGENCY DEPT VISIT MOD MDM: CPT

## 2019-06-06 PROCEDURE — 96374 THER/PROPH/DIAG INJ IV PUSH: CPT

## 2019-06-06 PROCEDURE — 83690 ASSAY OF LIPASE: CPT

## 2019-06-06 PROCEDURE — 96372 THER/PROPH/DIAG INJ SC/IM: CPT

## 2019-06-06 PROCEDURE — 83605 ASSAY OF LACTIC ACID: CPT

## 2019-06-06 PROCEDURE — 85025 COMPLETE CBC W/AUTO DIFF WBC: CPT

## 2019-06-06 PROCEDURE — 81001 URINALYSIS AUTO W/SCOPE: CPT

## 2019-06-06 PROCEDURE — 74177 CT ABD & PELVIS W/CONTRAST: CPT

## 2019-06-06 PROCEDURE — 6360000002 HC RX W HCPCS: Performed by: EMERGENCY MEDICINE

## 2019-06-06 PROCEDURE — 80053 COMPREHEN METABOLIC PANEL: CPT

## 2019-06-06 PROCEDURE — 2580000003 HC RX 258: Performed by: EMERGENCY MEDICINE

## 2019-06-06 RX ORDER — 0.9 % SODIUM CHLORIDE 0.9 %
1000 INTRAVENOUS SOLUTION INTRAVENOUS ONCE
Status: COMPLETED | OUTPATIENT
Start: 2019-06-06 | End: 2019-06-06

## 2019-06-06 RX ORDER — ONDANSETRON 4 MG/1
4 TABLET, ORALLY DISINTEGRATING ORAL EVERY 8 HOURS PRN
Qty: 15 TABLET | Refills: 0 | Status: SHIPPED | OUTPATIENT
Start: 2019-06-06 | End: 2019-08-26 | Stop reason: SDUPTHER

## 2019-06-06 RX ORDER — DICYCLOMINE HYDROCHLORIDE 10 MG/1
10 CAPSULE ORAL 3 TIMES DAILY
Qty: 15 CAPSULE | Refills: 3 | Status: SHIPPED | OUTPATIENT
Start: 2019-06-06 | End: 2020-10-09 | Stop reason: ALTCHOICE

## 2019-06-06 RX ORDER — ACETAMINOPHEN 325 MG/1
650 TABLET ORAL EVERY 6 HOURS PRN
Qty: 120 TABLET | Refills: 3 | Status: SHIPPED | OUTPATIENT
Start: 2019-06-06 | End: 2019-12-10 | Stop reason: ALTCHOICE

## 2019-06-06 RX ORDER — 0.9 % SODIUM CHLORIDE 0.9 %
10 VIAL (ML) INJECTION
Status: COMPLETED | OUTPATIENT
Start: 2019-06-06 | End: 2019-06-06

## 2019-06-06 RX ORDER — DICYCLOMINE HYDROCHLORIDE 10 MG/ML
20 INJECTION INTRAMUSCULAR ONCE
Status: COMPLETED | OUTPATIENT
Start: 2019-06-06 | End: 2019-06-06

## 2019-06-06 RX ORDER — ONDANSETRON 2 MG/ML
4 INJECTION INTRAMUSCULAR; INTRAVENOUS EVERY 30 MIN PRN
Status: DISCONTINUED | OUTPATIENT
Start: 2019-06-06 | End: 2019-06-06 | Stop reason: HOSPADM

## 2019-06-06 RX ORDER — FAMOTIDINE 20 MG/1
20 TABLET, FILM COATED ORAL 2 TIMES DAILY
Qty: 14 TABLET | Refills: 0 | Status: SHIPPED | OUTPATIENT
Start: 2019-06-06 | End: 2019-11-18

## 2019-06-06 RX ORDER — MORPHINE SULFATE 4 MG/ML
4 INJECTION, SOLUTION INTRAMUSCULAR; INTRAVENOUS EVERY 30 MIN PRN
Status: DISCONTINUED | OUTPATIENT
Start: 2019-06-06 | End: 2019-06-06 | Stop reason: HOSPADM

## 2019-06-06 RX ADMIN — DICYCLOMINE HYDROCHLORIDE 20 MG: 20 INJECTION, SOLUTION INTRAMUSCULAR at 09:36

## 2019-06-06 RX ADMIN — SODIUM CHLORIDE, PRESERVATIVE FREE 10 ML: 5 INJECTION INTRAVENOUS at 11:07

## 2019-06-06 RX ADMIN — SODIUM CHLORIDE 1000 ML: 9 INJECTION, SOLUTION INTRAVENOUS at 09:36

## 2019-06-06 RX ADMIN — ONDANSETRON 4 MG: 2 INJECTION INTRAMUSCULAR; INTRAVENOUS at 09:36

## 2019-06-06 RX ADMIN — IOPAMIDOL 75 ML: 755 INJECTION, SOLUTION INTRAVENOUS at 11:07

## 2019-06-06 RX ADMIN — FAMOTIDINE 20 MG: 10 INJECTION, SOLUTION INTRAVENOUS at 09:36

## 2019-06-06 RX ADMIN — SODIUM CHLORIDE 1000 ML: 9 INJECTION, SOLUTION INTRAVENOUS at 12:00

## 2019-06-06 RX ADMIN — MORPHINE SULFATE 4 MG: 4 INJECTION INTRAVENOUS at 12:00

## 2019-06-06 ASSESSMENT — ENCOUNTER SYMPTOMS
VOMITING: 1
ABDOMINAL PAIN: 1
EYES NEGATIVE: 1
ALLERGIC/IMMUNOLOGIC NEGATIVE: 1
NAUSEA: 1
DIARRHEA: 1
RESPIRATORY NEGATIVE: 1

## 2019-06-06 ASSESSMENT — PAIN SCALES - GENERAL: PAINLEVEL_OUTOF10: 10

## 2019-06-06 NOTE — ED PROVIDER NOTES
[facility]      TRIAGE CHIEF COMPLAINT:   Emesis; Nausea; and Diarrhea      [place]:  Sung Greco is a 29 y.o. female that presents with nausea vomiting diarrhea since 1 am.  Patient has a history of endometriosis, hysterectomy complete presents with generalized abdominal pain worse on the right side nausea vomiting diarrhea since 1 AM.  Denies any fevers chest pain shortness of breath urinary complaints no bloody stools or black stools. No travel no sick contacts. She has multiple allergies. She feels like this might be endometriosis. She is seeing ObGyn in general surgery. Pain comes and goes. Nothing makes better/worse. REVIEW OF SYSTEMS:  At least 10 systems reviewed and otherwise acutely negative except as in the [address] Review of Systems   Constitutional: Negative. HENT: Negative. Eyes: Negative. Respiratory: Negative. Cardiovascular: Negative. Gastrointestinal: Positive for abdominal pain, diarrhea, nausea and vomiting. Endocrine: Negative. Genitourinary: Negative. Musculoskeletal: Negative. Skin: Negative. Allergic/Immunologic: Negative. Neurological: Negative. Hematological: Negative. Psychiatric/Behavioral: Negative. All other systems reviewed and are negative. Past Medical History:   Diagnosis Date    Anxiety     Anxiety attack     Asthma     No Pulmonologist At This Time    Bipolar disorder ([facility] [address]     Bronchitis Last Episode In 2007    Chronic back pain     Depression     Endometriosis     Genital herpes complicating pregnancy in antepartum condition 1/6/2015    High risk pregnancy due to maternal drug abuse 1/6/2015    HSV-2 (herpes simplex virus 2) infection Last Episode 4-16    IBS (irritable bowel syndrome)     Panic attacks     Pelvic pain in female     Pneumonia Last Episode In 1996 Or 1997    PTSD (post-traumatic stress disorder)     \"They Dx.  Me With PTSD After My Mom Passed Away When I Was 16\"    Restless leg     Supervision of other high-risk pregnancy(V23.89) 2015    Teeth missing     Upper And Lower    UTI (urinary tract infection) In Past    No Current Symptoms    Wears partial dentures     Upper     Past Surgical History:   Procedure Laterality Date    ABDOMINAL EXPLORATION SURGERY       SECTION  1-6-15     SECTION      DENTAL SURGERY      Teeth Extracted In Past    ENDOMETRIAL ABLATION  9-15,  Or     HYSTERECTOMY  2016    Total laparoscopic hysterectomy, BSO, cysto    HYSTERECTOMY, TOTAL ABDOMINAL       Family History   Problem Relation Age of Onset    Early Death Mother         45 Or 44    Arthritis Mother     High Blood Pressure Mother     Substance Abuse Father         \"Crackhead\"    Mental Illness Sister         \"Split Personality, Anxiety\"    High Blood Pressure Sister     Heart Disease Brother     Depression Sister     Mental Illness Sister         Anxiety, PTSD     Social History     Socioeconomic History    Marital status: Single     Spouse name: Not on file    Number of children: Not on file    Years of education: Not on file    Highest education level: Not on file   Occupational History    Not on file   Social Needs    Financial resource strain: Not on file    Food insecurity:     Worry: Not on file     Inability: Not on file    Transportation needs:     Medical: Not on file     Non-medical: Not on file   Tobacco Use    Smoking status: Current Every Day Smoker     Packs/day: 0.50     Years: 11.00     Pack years: 5.50     Types: Cigarettes    Smokeless tobacco: Never Used   Substance and Sexual Activity    Alcohol use: Not Currently     Comment: occ    Drug use: Yes     Types: Marijuana     Comment: \"Smoke Marijuana Maybe Once A Month\"    Sexual activity: Not Currently   Lifestyle    Physical activity:     Days per week: Not on file     Minutes per session: Not on file    Stress: Not on file   Relationships    Social connections:     Talks on phone: Not on file     Gets together: Not on file     Attends Christianity service: Not on file     Active member of club or organization: Not on file     Attends meetings of clubs or organizations: Not on file     Relationship status: Not on file    Intimate partner violence:     Fear of current or ex partner: Not on file     Emotionally abused: Not on file     Physically abused: Not on file     Forced sexual activity: Not on file   Other Topics Concern    Not on file   Social History Narrative    ** Merged History Encounter **          Current Facility-Administered Medications   Medication Dose Route Frequency Provider Last Rate Last Dose    ondansetron (ZOFRAN) injection 4 mg  4 mg Intravenous Q30 Min PRN Lamond Dura, DO   4 mg at 06/06/19 0936    0.9 % sodium chloride bolus  1,000 mL Intravenous Once Lamond Dura, DO 1,000 mL/hr at 06/06/19 1200 1,000 mL at 06/06/19 1200    morphine sulfate (PF) injection 4 mg  4 mg Intravenous Q30 Min PRN Lamond Dura, DO   4 mg at 06/06/19 1200     Current Outpatient Medications   Medication Sig Dispense Refill    ondansetron (ZOFRAN ODT) 4 MG disintegrating tablet Take 1 tablet by mouth every 8 hours as needed for Nausea 15 tablet 0    famotidine (PEPCID) 20 MG tablet Take 1 tablet by mouth 2 times daily 14 tablet 0    dicyclomine (BENTYL) 10 MG capsule Take 1 capsule by mouth 3 times daily As needed for abdominal pain 15 capsule 3    acetaminophen (TYLENOL) 325 MG tablet Take 2 tablets by mouth every 6 hours as needed for Pain 120 tablet 3    lidocaine (XYLOCAINE) 5 % ointment Apply topically to area of pain 1-2 per day.  10 g 0    estradiol (ESTRACE) 1 MG tablet   11    albuterol sulfate  (90 Base) MCG/ACT inhaler Inhale 2 puffs into the lungs every 6 hours as needed for Wheezing or Shortness of Breath (or cough) 1 Inhaler 0      Allergies   Allergen Reactions    Latex Rash    Bactrim [Sulfamethoxazole-Trimethoprim] Nausea And Vomiting    Ciprofloxacin Nausea And Vomiting    Codeine Itching and Nausea And Vomiting    Toradol [Ketorolac Tromethamine] Itching, Nausea And Vomiting and Rash     \"Migraines\"    Tramadol Itching, Nausea And Vomiting and Rash     \"Migraines\"    Vicodin [Hydrocodone-Acetaminophen] Itching and Nausea And Vomiting     \"Anxiety Gets Really Bad\"    Vistaril [Hydroxyzine Hcl] Itching     \"I Pass Out\"    Bactrim [Sulfamethoxazole-Trimethoprim]     Ciprofloxacin     Codeine     Toradol [Ketorolac Tromethamine]     Tramadol     Vicodin [Hydrocodone-Acetaminophen]     Vistaril [Hydroxyzine Hcl]      Current Facility-Administered Medications   Medication Dose Route Frequency Provider Last Rate Last Dose    ondansetron (ZOFRAN) injection 4 mg  4 mg Intravenous Q30 Min PRN Sherolyn Crape, DO   4 mg at 06/06/19 0936    0.9 % sodium chloride bolus  1,000 mL Intravenous Once Sherolyn Crape, DO 1,000 mL/hr at 06/06/19 1200 1,000 mL at 06/06/19 1200    morphine sulfate (PF) injection 4 mg  4 mg Intravenous Q30 Min PRN Sherolyn Crape, DO   4 mg at 06/06/19 1200     Current Outpatient Medications   Medication Sig Dispense Refill    ondansetron (ZOFRAN ODT) 4 MG disintegrating tablet Take 1 tablet by mouth every 8 hours as needed for Nausea 15 tablet 0    famotidine (PEPCID) 20 MG tablet Take 1 tablet by mouth 2 times daily 14 tablet 0    dicyclomine (BENTYL) 10 MG capsule Take 1 capsule by mouth 3 times daily As needed for abdominal pain 15 capsule 3    acetaminophen (TYLENOL) 325 MG tablet Take 2 tablets by mouth every 6 hours as needed for Pain 120 tablet 3    lidocaine (XYLOCAINE) 5 % ointment Apply topically to area of pain 1-2 per day.  10 g 0    estradiol (ESTRACE) 1 MG tablet   11    albuterol sulfate  (90 Base) MCG/ACT inhaler Inhale 2 puffs into the lungs every 6 hours as needed for Wheezing or Shortness of Breath (or cough) 1 Inhaler 0       Nursing Notes Reviewed    VITAL SIGNS:  ED Triage Vitals   Enc Vitals Group      BP       Pulse       Resp       Temp       Temp src       SpO2       Weight       Height       Head Circumference       Peak Flow       Pain Score       Pain Loc       Pain Edu? Excl. in [address]? PHYSICAL EXAM:  Physical Exam   Constitutional: She is oriented to person, place, and time. She appears well-developed and well-nourished. She is active and cooperative. Non-toxic appearance. She does not have a sickly appearance. She appears ill. No distress. HENT:   Head: Normocephalic and atraumatic. Right Ear: External ear normal.   Left Ear: External ear normal.   Mouth/Throat: Oropharynx is clear and moist. No oropharyngeal exudate. Eyes: Pupils are equal, round, and reactive to light. Conjunctivae and EOM are normal. Right eye exhibits no discharge. Left eye exhibits no discharge. No scleral icterus. Neck: Normal range of motion, full passive range of motion without pain and phonation normal. No JVD present. No neck rigidity. No edema, no erythema and normal range of motion present. Cardiovascular: Regular rhythm, normal heart sounds and intact distal pulses. Exam reveals no gallop and no friction rub. No murmur heard. Pulmonary/Chest: Effort normal and breath sounds normal. No stridor. No respiratory distress. She has no wheezes. She has no rales. Abdominal: Soft. Normal appearance and bowel sounds are normal. She exhibits no distension, no pulsatile midline mass and no mass. There is tenderness in the right upper quadrant and right lower quadrant. There is CVA tenderness. There is no rigidity, no rebound, no guarding, no tenderness at McBurney's point and negative Barrios's sign. No hernia. Musculoskeletal: Normal range of motion. She exhibits no edema, tenderness or deformity. Neurological: She is alert and oriented to person, place, and time. She has normal strength. She is not disoriented. She displays no atrophy and no tremor.  No cranial nerve deficit or sensory deficit. She exhibits normal muscle tone. She displays no seizure activity. Coordination normal. GCS eye subscore is 4. GCS verbal subscore is 5. GCS motor subscore is 6. Skin: Skin is warm. No rash noted. She is not diaphoretic. No erythema. No pallor. Nursing note and vitals reviewed.         I have reviewed andinterpreted all of the currently available lab results from this visit (if applicable):    Results for orders placed or performed during the hospital encounter of 06/06/19   CBC Auto Differential   Result Value Ref Range    WBC 9.7 4.0 - 10.5 K/CU MM    RBC 5.07 4.2 - 5.4 M/CU MM    Hemoglobin 16.0 12.5 - 16.0 GM/DL    Hematocrit 48.4 (H) 37 - 47 %    MCV 95.5 78 - 100 FL    MCH 31.6 (H) 27 - 31 PG    MCHC 33.1 32.0 - 36.0 %    RDW 14.2 11.7 - 14.9 %    Platelets 343 215 - 698 K/CU MM    MPV 10.3 7.5 - 11.1 FL    Differential Type AUTOMATED DIFFERENTIAL     Segs Relative 69.6 (H) 36 - 66 %    Lymphocytes % 16.2 (L) 24 - 44 %    Monocytes % 12.1 (H) 0 - 4 %    Eosinophils % 1.3 0 - 3 %    Basophils % 0.5 0 - 1 %    Segs Absolute 6.8 K/CU MM    Lymphocytes # 1.6 K/CU MM    Monocytes # 1.2 K/CU MM    Eosinophils # 0.1 K/CU MM    Basophils # 0.1 K/CU MM    Nucleated RBC % 0.0 %    Total Nucleated RBC 0.0 K/CU MM    Total Immature Neutrophil 0.03 K/CU MM    Immature Neutrophil % 0.3 0 - 0.43 %   CMP   Result Value Ref Range    Sodium 141 135 - 145 MMOL/L    Potassium 4.0 3.5 - 5.1 MMOL/L    Chloride 102 99 - 110 mMol/L    CO2 25 21 - 32 MMOL/L    BUN 11 6 - 23 MG/DL    CREATININE 0.7 0.6 - 1.1 MG/DL    Glucose 106 (H) 70 - 99 MG/DL    Calcium 9.7 8.3 - 10.6 MG/DL    Alb 5.1 (H) 3.4 - 5.0 GM/DL    Total Protein 7.8 6.4 - 8.2 GM/DL    Total Bilirubin 0.4 0.0 - 1.0 MG/DL    ALT 15 10 - 40 U/L    AST 20 15 - 37 IU/L    Alkaline Phosphatase 78 40 - 129 IU/L    GFR Non-African American >60 >60 mL/min/1.73m2    GFR African American >60 >60 mL/min/1.73m2    Anion Gap 14 4 - 16

## 2019-06-06 NOTE — ED TRIAGE NOTES
Pt presents to ER via EMS for nausea, vomiting and diarrhea that started early this morning. Pt reports right sided abdominal pain as well.

## 2019-06-06 NOTE — ED NOTES
Bed: ED-33  Expected date:   Expected time:   Means of arrival:   Comments:  medic     [place] Card, RN  06/06/19 0829

## 2019-06-07 ENCOUNTER — HOSPITAL ENCOUNTER (OUTPATIENT)
Dept: ULTRASOUND IMAGING | Age: 29
Discharge: HOME | End: 2019-06-07
Payer: COMMERCIAL

## 2019-06-07 DIAGNOSIS — N63.12 UNSPECIFIED LUMP IN THE RIGHT BREAST, UPPER INNER QUADRANT: ICD-10-CM

## 2019-06-07 DIAGNOSIS — N64.4 BREAST PAIN: ICD-10-CM

## 2019-06-07 DIAGNOSIS — R19.7 DIARRHEA, UNSPECIFIED TYPE: ICD-10-CM

## 2019-06-07 DIAGNOSIS — R10.9 ABDOMINAL PAIN, UNSPECIFIED ABDOMINAL LOCATION: ICD-10-CM

## 2019-06-07 DIAGNOSIS — R11.2 NAUSEA AND VOMITING, INTRACTABILITY OF VOMITING NOT SPECIFIED, UNSPECIFIED VOMITING TYPE: ICD-10-CM

## 2019-06-07 PROCEDURE — 76642 ULTRASOUND BREAST LIMITED: CPT

## 2019-06-07 PROCEDURE — 76705 ECHO EXAM OF ABDOMEN: CPT

## 2019-06-09 ENCOUNTER — HOSPITAL ENCOUNTER (EMERGENCY)
Age: 29
Discharge: HOME | End: 2019-06-09
Payer: COMMERCIAL

## 2019-06-09 VITALS
TEMPERATURE: 98.3 F | WEIGHT: 111 LBS | BODY MASS INDEX: 20.96 KG/M2 | SYSTOLIC BLOOD PRESSURE: 98 MMHG | HEART RATE: 90 BPM | OXYGEN SATURATION: 100 % | HEIGHT: 61 IN | RESPIRATION RATE: 16 BRPM | DIASTOLIC BLOOD PRESSURE: 66 MMHG

## 2019-06-09 DIAGNOSIS — R10.11 RIGHT UPPER QUADRANT ABDOMINAL PAIN: Primary | ICD-10-CM

## 2019-06-09 DIAGNOSIS — R10.9 RIGHT FLANK PAIN: ICD-10-CM

## 2019-06-09 LAB
ALBUMIN SERPL-MCNC: 4.6 GM/DL (ref 3.4–5)
ALP BLD-CCNC: 70 IU/L (ref 40–129)
ALT SERPL-CCNC: 8 U/L (ref 10–40)
ANION GAP SERPL CALCULATED.3IONS-SCNC: 13 MMOL/L (ref 4–16)
AST SERPL-CCNC: 12 IU/L (ref 15–37)
BACTERIA: ABNORMAL /HPF
BASOPHILS ABSOLUTE: 0.1 K/CU MM
BASOPHILS RELATIVE PERCENT: 0.5 % (ref 0–1)
BILIRUB SERPL-MCNC: 0.4 MG/DL (ref 0–1)
BILIRUBIN URINE: NEGATIVE MG/DL
BLOOD, URINE: NEGATIVE
BUN BLDV-MCNC: 8 MG/DL (ref 6–23)
CALCIUM SERPL-MCNC: 9.3 MG/DL (ref 8.3–10.6)
CHLORIDE BLD-SCNC: 102 MMOL/L (ref 99–110)
CLARITY: CLEAR
CO2: 26 MMOL/L (ref 21–32)
COLOR: YELLOW
CREAT SERPL-MCNC: 0.7 MG/DL (ref 0.6–1.1)
DIFFERENTIAL TYPE: ABNORMAL
EOSINOPHILS ABSOLUTE: 0.3 K/CU MM
EOSINOPHILS RELATIVE PERCENT: 3.1 % (ref 0–3)
GFR AFRICAN AMERICAN: >60 ML/MIN/1.73M2
GFR NON-AFRICAN AMERICAN: >60 ML/MIN/1.73M2
GLUCOSE BLD-MCNC: 113 MG/DL (ref 70–99)
GLUCOSE, URINE: NEGATIVE MG/DL
HCT VFR BLD CALC: 46.6 % (ref 37–47)
HEMOGLOBIN: 15 GM/DL (ref 12.5–16)
IMMATURE NEUTROPHIL %: 0.2 % (ref 0–0.43)
KETONES, URINE: NEGATIVE MG/DL
LEUKOCYTE ESTERASE, URINE: NEGATIVE
LIPASE: 25 IU/L (ref 13–60)
LYMPHOCYTES ABSOLUTE: 2.3 K/CU MM
LYMPHOCYTES RELATIVE PERCENT: 24.4 % (ref 24–44)
MCH RBC QN AUTO: 31.6 PG (ref 27–31)
MCHC RBC AUTO-ENTMCNC: 32.2 % (ref 32–36)
MCV RBC AUTO: 98.1 FL (ref 78–100)
MONOCYTES ABSOLUTE: 1.3 K/CU MM
MONOCYTES RELATIVE PERCENT: 13.6 % (ref 0–4)
MUCUS: ABNORMAL HPF
NITRITE URINE, QUANTITATIVE: NEGATIVE
NUCLEATED RBC %: 0 %
PDW BLD-RTO: 13.8 % (ref 11.7–14.9)
PH, URINE: 6 (ref 5–8)
PLATELET # BLD: 380 K/CU MM (ref 140–440)
PMV BLD AUTO: 10.5 FL (ref 7.5–11.1)
POTASSIUM SERPL-SCNC: 4.1 MMOL/L (ref 3.5–5.1)
PROTEIN UA: NEGATIVE MG/DL
RBC # BLD: 4.75 M/CU MM (ref 4.2–5.4)
RBC URINE: <1 /HPF (ref 0–6)
SEGMENTED NEUTROPHILS ABSOLUTE COUNT: 5.4 K/CU MM
SEGMENTED NEUTROPHILS RELATIVE PERCENT: 58.2 % (ref 36–66)
SODIUM BLD-SCNC: 141 MMOL/L (ref 135–145)
SPECIFIC GRAVITY UA: 1.01 (ref 1–1.03)
SQUAMOUS EPITHELIAL: 4 /HPF
TOTAL IMMATURE NEUTOROPHIL: 0.02 K/CU MM
TOTAL NUCLEATED RBC: 0 K/CU MM
TOTAL PROTEIN: 7.1 GM/DL (ref 6.4–8.2)
TRICHOMONAS: ABNORMAL /HPF
UROBILINOGEN, URINE: NORMAL MG/DL (ref 0.2–1)
WBC # BLD: 9.3 K/CU MM (ref 4–10.5)
WBC UA: 2 /HPF (ref 0–5)

## 2019-06-09 PROCEDURE — 6370000000 HC RX 637 (ALT 250 FOR IP): Performed by: PHYSICIAN ASSISTANT

## 2019-06-09 PROCEDURE — 81001 URINALYSIS AUTO W/SCOPE: CPT

## 2019-06-09 PROCEDURE — 6360000002 HC RX W HCPCS: Performed by: PHYSICIAN ASSISTANT

## 2019-06-09 PROCEDURE — 96372 THER/PROPH/DIAG INJ SC/IM: CPT

## 2019-06-09 PROCEDURE — 99284 EMERGENCY DEPT VISIT MOD MDM: CPT

## 2019-06-09 PROCEDURE — 36415 COLL VENOUS BLD VENIPUNCTURE: CPT

## 2019-06-09 PROCEDURE — 83690 ASSAY OF LIPASE: CPT

## 2019-06-09 PROCEDURE — 85025 COMPLETE CBC W/AUTO DIFF WBC: CPT

## 2019-06-09 PROCEDURE — 80053 COMPREHEN METABOLIC PANEL: CPT

## 2019-06-09 RX ORDER — OXYCODONE HYDROCHLORIDE AND ACETAMINOPHEN 5; 325 MG/1; MG/1
1 TABLET ORAL EVERY 6 HOURS PRN
Qty: 3 TABLET | Refills: 0 | Status: SHIPPED | OUTPATIENT
Start: 2019-06-09 | End: 2019-06-10

## 2019-06-09 RX ORDER — MORPHINE SULFATE 4 MG/ML
4 INJECTION, SOLUTION INTRAMUSCULAR; INTRAVENOUS ONCE
Status: COMPLETED | OUTPATIENT
Start: 2019-06-09 | End: 2019-06-09

## 2019-06-09 RX ORDER — ONDANSETRON 4 MG/1
4 TABLET, ORALLY DISINTEGRATING ORAL ONCE
Status: COMPLETED | OUTPATIENT
Start: 2019-06-09 | End: 2019-06-09

## 2019-06-09 RX ADMIN — ONDANSETRON 4 MG: 4 TABLET, ORALLY DISINTEGRATING ORAL at 11:40

## 2019-06-09 RX ADMIN — MORPHINE SULFATE 4 MG: 4 INJECTION INTRAVENOUS at 11:40

## 2019-06-09 ASSESSMENT — PAIN SCALES - GENERAL
PAINLEVEL_OUTOF10: 9
PAINLEVEL_OUTOF10: 9

## 2019-06-09 ASSESSMENT — PAIN DESCRIPTION - ORIENTATION: ORIENTATION: RIGHT

## 2019-06-09 ASSESSMENT — PAIN DESCRIPTION - LOCATION: LOCATION: ABDOMEN

## 2019-06-09 ASSESSMENT — PAIN DESCRIPTION - PAIN TYPE: TYPE: ACUTE PAIN

## 2019-06-09 NOTE — ED PROVIDER NOTES
every 6 hours as needed for Pain 120 tablet 3    lidocaine (XYLOCAINE) 5 % ointment Apply topically to area of pain 1-2 per day.  10 g 0    estradiol (ESTRACE) 1 MG tablet   11    albuterol sulfate  (90 Base) MCG/ACT inhaler Inhale 2 puffs into the lungs every 6 hours as needed for Wheezing or Shortness of Breath (or cough) 1 Inhaler 0       ALLERGIES    Allergies   Allergen Reactions    Latex Rash    Bactrim [Sulfamethoxazole-Trimethoprim] Nausea And Vomiting    Ciprofloxacin Nausea And Vomiting    Codeine Itching and Nausea And Vomiting    Toradol [Ketorolac Tromethamine] Itching, Nausea And Vomiting and Rash     \"Migraines\"    Tramadol Itching, Nausea And Vomiting and Rash     \"Migraines\"    Vicodin [Hydrocodone-Acetaminophen] Itching and Nausea And Vomiting     \"Anxiety Gets Really Bad\"    Vistaril [Hydroxyzine Hcl] Itching     \"I Pass Out\"    Bactrim [Sulfamethoxazole-Trimethoprim]     Ciprofloxacin     Codeine     Toradol [Ketorolac Tromethamine]     Tramadol     Vicodin [Hydrocodone-Acetaminophen]     Vistaril [Hydroxyzine Hcl]        SOCIAL AND FAMILY HISTORY    Social History     Socioeconomic History    Marital status: Single     Spouse name: None    Number of children: None    Years of education: None    Highest education level: None   Occupational History    None   Social Needs    Financial resource strain: None    Food insecurity:     Worry: None     Inability: None    Transportation needs:     Medical: None     Non-medical: None   Tobacco Use    Smoking status: Current Every Day Smoker     Packs/day: 0.50     Years: 11.00     Pack years: 5.50     Types: Cigarettes    Smokeless tobacco: Never Used   Substance and Sexual Activity    Alcohol use: Not Currently     Comment: occ    Drug use: Yes     Types: Marijuana     Comment: \"Smoke Marijuana Maybe Once A Month\"    Sexual activity: Not Currently   Lifestyle    Physical activity:     Days per week: None     Minutes per session: None    Stress: None   Relationships    Social connections:     Talks on phone: None     Gets together: None     Attends [employer] service: None     Active member of club or organization: None     Attends meetings of clubs or organizations: None     Relationship status: None    Intimate partner violence:     Fear of current or ex partner: None     Emotionally abused: None     Physically abused: None     Forced sexual activity: None   Other Topics Concern    None   Social History Narrative    ** Merged History Encounter **          Family History   Problem Relation Age of Onset    Early Death Mother         45 Or 44    Arthritis Mother     High Blood Pressure Mother     Substance Abuse Father         \"Crackhead\"    Mental Illness Sister         \"Split Personality, Anxiety\"    High Blood Pressure Sister     Heart Disease Brother     Depression Sister     Mental Illness Sister         Anxiety, PTSD       PHYSICAL EXAM    VITAL SIGNS: BP 98/66   Pulse 90   Temp 98.3 °F (36.8 °C) (Oral)   Resp 16   Ht 5' 1\" (1.549 m)   Wt 111 lb (50.3 kg)   LMP  (Approximate)   SpO2 100%   BMI 20.97 kg/m²   Constitutional:  Well developed, well nourished. No distress  Eyes:  Sclera nonicteric, conjunctiva moist  HENT:  Atraumatic. PERRL. EOMI. Moist mucus membranes. Neck/Lymphatics: supple, no JVD, no swollen nodes  Respiratory:  No retractions, no accessory muscle use, normal breath sounds   Cardiovascular:   normal rate, normal rhythm, no murmurs    GI:     No gross discoloration.       -no Towanda's sign (periumbilical ecchymosis)       -no Grey-Argueta's sign (flank ecchymosis)  Bowel sounds present, no audible bruits.  Soft,  no distention, no guarding, no rigidity,   + diffuse abdominal tenderness, most significant in right upper quadrant, right flank and right lower abdomen, no rebound tenderness, no palpable pulsatile masses,   No McBurney's point tenderness   Negative Rovsing sign    Negative Barrios's sign. Back:  + right sided CVA tenderness to percussion.   Musculoskeletal:  No edema, no deformity  Vascular: DP pulses 2+ equal bilaterally  Integument: No rash, dry skin  Neurologic:  Alert & oriented, normal speech  Psychiatric: Cooperative, pleasant affect       LABS:  Results for orders placed or performed during the hospital encounter of 06/09/19   CBC auto diff   Result Value Ref Range    WBC 9.3 4.0 - 10.5 K/CU MM    RBC 4.75 4.2 - 5.4 M/CU MM    Hemoglobin 15.0 12.5 - 16.0 GM/DL    Hematocrit 46.6 37 - 47 %    MCV 98.1 78 - 100 FL    MCH 31.6 (H) 27 - 31 PG    MCHC 32.2 32.0 - 36.0 %    RDW 13.8 11.7 - 14.9 %    Platelets 882 054 - 234 K/CU MM    MPV 10.5 7.5 - 11.1 FL    Differential Type AUTOMATED DIFFERENTIAL     Segs Relative 58.2 36 - 66 %    Lymphocytes % 24.4 24 - 44 %    Monocytes % 13.6 (H) 0 - 4 %    Eosinophils % 3.1 (H) 0 - 3 %    Basophils % 0.5 0 - 1 %    Segs Absolute 5.4 K/CU MM    Lymphocytes # 2.3 K/CU MM    Monocytes # 1.3 K/CU MM    Eosinophils # 0.3 K/CU MM    Basophils # 0.1 K/CU MM    Nucleated RBC % 0.0 %    Total Nucleated RBC 0.0 K/CU MM    Total Immature Neutrophil 0.02 K/CU MM    Immature Neutrophil % 0.2 0 - 0.43 %   Comprehensive Metabolic Panel   Result Value Ref Range    Sodium 141 135 - 145 MMOL/L    Potassium 4.1 3.5 - 5.1 MMOL/L    Chloride 102 99 - 110 mMol/L    CO2 26 21 - 32 MMOL/L    BUN 8 6 - 23 MG/DL    CREATININE 0.7 0.6 - 1.1 MG/DL    Glucose 113 (H) 70 - 99 MG/DL    Calcium 9.3 8.3 - 10.6 MG/DL    Alb 4.6 3.4 - 5.0 GM/DL    Total Protein 7.1 6.4 - 8.2 GM/DL    Total Bilirubin 0.4 0.0 - 1.0 MG/DL    ALT 8 (L) 10 - 40 U/L    AST 12 (L) 15 - 37 IU/L    Alkaline Phosphatase 70 40 - 129 IU/L    GFR Non-African American >60 >60 mL/min/1.73m2    GFR African American >60 >60 mL/min/1.73m2    Anion Gap 13 4 - 16   Lipase   Result Value Ref Range    Lipase 25 13 - 60 IU/L   Urinalysis   Result Value Ref Range    Color, UA YELLOW YELLOW    Clarity, UA CLEAR CLEAR Glucose, Urine NEGATIVE NEGATIVE MG/DL    Bilirubin Urine NEGATIVE NEGATIVE MG/DL    Ketones, Urine NEGATIVE NEGATIVE MG/DL    Specific Gravity, UA 1.006 1.001 - 1.035    Blood, Urine NEGATIVE NEGATIVE    pH, Urine 6.0 5.0 - 8.0    Protein, UA NEGATIVE NEGATIVE MG/DL    Urobilinogen, Urine NORMAL 0.2 - 1.0 MG/DL    Nitrite Urine, Quantitative NEGATIVE NEGATIVE    Leukocyte Esterase, Urine NEGATIVE NEGATIVE    RBC, UA <1 0 - 6 /HPF    WBC, UA 2 0 - 5 /HPF    Bacteria, UA OCCASIONAL (A) NEGATIVE /HPF    Squam Epithel, UA 4 /HPF    Mucus, UA RARE (A) NEGATIVE HPF    Trichomonas, UA NONE SEEN NONE SEEN /HPF       ED COURSE & MEDICAL DECISION MAKING       Vital signs and nursing notes reviewed during ED course. I have independently evaluated this patient . Supervising MD present in the Emergency Department, available for consultation, throughout entirety of  patient care. Patient returns to the emergency department with continued right-sided abdominal pain. On arrival, patient is mildy tachycardic with pulse of 105, otherwise normal vital signs, afebrile. On chart review, patient had negative CT imaging of abdomen and pelvis on 6/6/2019. Ultrasound obtained on 6/7/2019 of right upper quadrant without pericholecystic fluid, no wall thickening or stones. Ultrasound of right breast was also negative. HIDA scan is ordered to be done in 2 days. Patient reports improvement of symptoms following dose of pain medication here in the emergency department. Lab works appear unchanged from baseline. Patient case discussed with her surgeon, Dr. Charito Henriquez. He is comfortable with patient following up as an outpatient for further evaluation and treatment, states if we are able to obtain HIDA scan today that may be beneficial.  He has been working with patient's OB/GYN, Dr. Tammie Valles and they do have surgery scheduled for patient.    As patient has dose of narcotic medication in the ED, she will need to wait 6 hours to have HIDA scan.  This is offered the patient, however she chooses. She states she does have work later this evening and prefers to keep her outpatient appointment to have this imaging completed. I believe this is reasonable as repeat exam continues to have soft, nonsurgical abdomen. She has had negative CT and ultrasound within the past week, lower suspicion for other emergent etiology requiring intervention at this time. Discussed with patient following up closely as scheduled, she does have an appointment with Dr. Chloe Calles tomorrow and HIDA scan scheduled the following day. Will give 3 doses of Percocet the patient can take today and tomorrow, she understands that she cannot have this in her system for the HIDA scan. She is to return with any acutely worsening symptoms, otherwise following up as scheduled. She is agreeable with this plan and comfortable discharge. Clinical  IMPRESSION    1. Right upper quadrant abdominal pain    2. Right flank pain        Disposition referral (if applicable):  DO Pepper Kelley 888  [phone]    Schedule an appointment as soon as possible for a visit in 1 day  For recheck of symptoms treated for today    Victor Manuel Banegas MD  [address] [facility] [address]  [facility]  [phone]    Schedule an appointment as soon as possible for a visit in 1 day      [facility] Emergency Department  [address] [facility] [id number] [phone]  Go to   As needed, If symptoms worsen      Disposition medications (if applicable):  Discharge Medication List as of 6/9/2019  1:33 PM      START taking these medications    Details   oxyCODONE-acetaminophen (PERCOCET) 5-325 MG per tablet Take 1 tablet by mouth every 6 hours as needed for Pain for up to 1 day. Intended supply: 3 days.  Take lowest dose possible to manage pain, Disp-3 tablet, R-0Print               Comment: Please note this report has been produced using speech recognition software and may contain errors related to that system including errors in grammar, punctuation, and spelling, as well as words and phrases that may be inappropriate. If there are any questions or concerns please feel free to contact the dictating provider for clarification.         SUDHAKAR Lloyd  06/12/19 2608

## 2019-06-11 ENCOUNTER — HOSPITAL ENCOUNTER (OUTPATIENT)
Dept: NUCLEAR MEDICINE | Age: 29
Discharge: HOME | End: 2019-06-11
Payer: COMMERCIAL

## 2019-06-11 VITALS — BODY MASS INDEX: 20.97 KG/M2 | WEIGHT: 111 LBS

## 2019-06-11 DIAGNOSIS — R10.11 RUQ PAIN: ICD-10-CM

## 2019-06-11 PROCEDURE — 78227 HEPATOBIL SYST IMAGE W/DRUG: CPT

## 2019-06-11 PROCEDURE — A9537 TC99M MEBROFENIN: HCPCS | Performed by: SURGERY

## 2019-06-11 PROCEDURE — 2580000003 HC RX 258: Performed by: SURGERY

## 2019-06-11 PROCEDURE — 3430000000 HC RX DIAGNOSTIC RADIOPHARMACEUTICAL: Performed by: SURGERY

## 2019-06-11 PROCEDURE — 6360000004 HC RX CONTRAST MEDICATION: Performed by: SURGERY

## 2019-06-11 RX ADMIN — Medication 6 MILLICURIE: at 11:55

## 2019-06-11 RX ADMIN — SODIUM CHLORIDE 1.01 MCG: 9 INJECTION, SOLUTION INTRAVENOUS at 13:00

## 2019-06-17 ENCOUNTER — HOSPITAL ENCOUNTER (EMERGENCY)
Age: 29
Discharge: HOME | End: 2019-06-17
Payer: COMMERCIAL

## 2019-06-17 VITALS
OXYGEN SATURATION: 100 % | HEART RATE: 84 BPM | RESPIRATION RATE: 20 BRPM | WEIGHT: 120 LBS | BODY MASS INDEX: 22.08 KG/M2 | TEMPERATURE: 98.2 F | DIASTOLIC BLOOD PRESSURE: 80 MMHG | HEIGHT: 62 IN | SYSTOLIC BLOOD PRESSURE: 113 MMHG

## 2019-06-17 DIAGNOSIS — A60.00 GENITAL HERPES SIMPLEX, UNSPECIFIED SITE: Primary | ICD-10-CM

## 2019-06-17 PROCEDURE — 99282 EMERGENCY DEPT VISIT SF MDM: CPT

## 2019-06-17 ASSESSMENT — PAIN DESCRIPTION - DESCRIPTORS: DESCRIPTORS: SORE

## 2019-06-17 ASSESSMENT — PAIN DESCRIPTION - LOCATION: LOCATION: VAGINA;PERINEUM

## 2019-06-17 NOTE — ED PROVIDER NOTES
female     Pneumonia Last Episode In [address] PTSD (post-traumatic stress disorder)     \"They Dx. Me With PTSD After My Mom Passed Away When I Was 16\"    Restless leg     Supervision of other high-risk pregnancy(V23.89) 2015    Teeth missing     Upper And Lower    UTI (urinary tract infection) In Past    No Current Symptoms    Wears partial dentures     Upper     Past Surgical History:   Procedure Laterality Date    ABDOMINAL EXPLORATION SURGERY       SECTION  1-6-15     SECTION      DENTAL SURGERY      Teeth Extracted In Past    ENDOMETRIAL ABLATION  9-15, 2000 [place]     HYSTERECTOMY  2016    Total laparoscopic hysterectomy, BSO, cysto    HYSTERECTOMY, TOTAL ABDOMINAL         CURRENT MEDICATIONS    Current Outpatient Rx   Medication Sig Dispense Refill    oxyCODONE-acetaminophen (PERCOCET) 5-325 MG per tablet Take 1 tablet by mouth every 6 hours as needed for Pain for up to 7 days. 15 tablet 0    valACYclovir (VALTREX) 500 MG tablet   6    ibuprofen (ADVIL;MOTRIN) 600 MG tablet Take 600 mg by mouth every 6 hours as needed for Pain      ondansetron (ZOFRAN ODT) 4 MG disintegrating tablet Take 1 tablet by mouth every 8 hours as needed for Nausea 15 tablet 0    famotidine (PEPCID) 20 MG tablet Take 1 tablet by mouth 2 times daily 14 tablet 0    dicyclomine (BENTYL) 10 MG capsule Take 1 capsule by mouth 3 times daily As needed for abdominal pain 15 capsule 3    acetaminophen (TYLENOL) 325 MG tablet Take 2 tablets by mouth every 6 hours as needed for Pain 120 tablet 3    lidocaine (XYLOCAINE) 5 % ointment Apply topically to area of pain 1-2 per day.  10 g 0    estradiol (ESTRACE) 1 MG tablet   11    albuterol sulfate  (90 Base) MCG/ACT inhaler Inhale 2 puffs into the lungs every 6 hours as needed for Wheezing or Shortness of Breath (or cough) 1 Inhaler 0       ALLERGIES    Allergies   Allergen Reactions    Latex Rash    Bactrim [Sulfamethoxazole-Trimethoprim] Nausea And Vomiting    Ciprofloxacin Nausea And Vomiting    Codeine Itching and Nausea And Vomiting    Toradol [Ketorolac Tromethamine] Itching, Nausea And Vomiting and Rash     \"Migraines\"    Tramadol Itching, Nausea And Vomiting and Rash     \"Migraines\"    Vicodin [Hydrocodone-Acetaminophen] Itching and Nausea And Vomiting     \"Anxiety Gets Really Bad\"    Vistaril [Hydroxyzine Hcl] Itching     \"I Pass Out\"    Bactrim [Sulfamethoxazole-Trimethoprim]     Ciprofloxacin     Codeine     Toradol [Ketorolac Tromethamine]     Tramadol     Vicodin [Hydrocodone-Acetaminophen]     Vistaril [Hydroxyzine Hcl]        SOCIAL & FAMILY HISTORY    Social History     Socioeconomic History    Marital status: Single     Spouse name: None    Number of children: None    Years of education: None    Highest education level: None   Occupational History    None   Social Needs    Financial resource strain: None    Food insecurity:     Worry: None     Inability: None    Transportation needs:     Medical: None     Non-medical: None   Tobacco Use    Smoking status: Current Every Day Smoker     Packs/day: 0.50     Years: 11.00     Pack years: 5.50     Types: Cigarettes    Smokeless tobacco: Never Used   Substance and Sexual Activity    Alcohol use: Not Currently     Comment: occ    Drug use: Yes     Types: Marijuana     Comment: \"Smoke Marijuana Maybe Once A Month\"    Sexual activity: Not Currently   Lifestyle    Physical activity:     Days per week: None     Minutes per session: None    Stress: None   Relationships    Social connections:     Talks on phone: None     Gets together: None     Attends Evangel[employer] service: None     Active member of club or organization: None     Attends meetings of clubs or organizations: None     Relationship status: None    Intimate partner violence:     Fear of current or ex partner: None     Emotionally abused: None     Physically abused: None Forced sexual activity: None   Other Topics Concern    None   Social History Narrative    ** Merged History Encounter **          Family History   Problem Relation Age of Onset    Early Death Mother         45 Or 44    Arthritis Mother     High Blood Pressure Mother     Substance Abuse Father         \"Crackhead\"    Mental Illness Sister         \"Split Personality, Anxiety\"    High Blood Pressure Sister     Heart Disease Brother     Depression Sister     Mental Illness Sister         Anxiety, PTSD       PHYSICAL EXAM    VITAL SIGNS: /80   Pulse 84   Temp 98.2 °F (36.8 °C) (Oral)   Resp 20   Ht 5' 1.5\" (1.562 m)   Wt 120 lb (54.4 kg)   LMP  (Approximate)   SpO2 100%   BMI 22.31 kg/m²   Constitutional:  Well developed, well nourished  HENT:  Atraumatic, no facial or lip swelling  ORAL EXAM:   No tongue swelling, airway patent/Throat is clear  Neck/Lymphatics: supple, no JVD, no swollen nodes  Respiratory:  Nonlabored breathing. Lungs clear. Cardiovascular: Normal rate and rhythm  Abdomen: Mild right-sided abdominal tenderness with no guarding, mild right-sided CVA tenderness. Musculoskeletal:  No edema, no acute deformities. Integument:  Erythematous papules to left lower inguinal region beside left labia. No bruising vesicles. No induration or fluctuance. Palpable lymphadenopathy. Female nurse present during exam.        ED [address]      Presents as above. On exam there is being erythematous papules with no oozing vesicles. No petechiae. Induration or fluctuance. Patient is already taking acyclovir 1 g 3 times a day. Patient states she is prescribed this from her gynecologist and currently is in process of looking for a new gynecologist.  Patient is requesting stronger medications other than naproxen and Tylenol. Patient states she is having continued right-sided abdominal tenderness, abdomen is soft with no guarding, mildly tender.   Patient is following with gastroenterology and surgery for this it is been going on for 3 to 4 weeks. Patient is offered further evaluation into this pain today with lab work, urine she declines. I discussed with patient that she could have pyelonephritis or other acute abdominal process she understands and does not want any further testing at this time. Review of patient's previous visits does show that she has received Percocet from multiple providers in the last 3 months. I do not feel comfortable prescribing narcotic medications at this time. Patient becomes angry after I recommend continuing current treatment with naproxen, Tylenol and acyclovir and following up with gynecology for further evaluation herpes flare. Patient walks on prior to receiving discharge instructions. Clinical  IMPRESSION    Genital herpes simplex, unspecified site    Patient with left emergency department prior to receiving paperwork. Comment: Please note this report has been produced using speech recognition software and may contain errors related to that system including errors in grammar, punctuation, and spelling, as well as words and phrases that may be inappropriate. If there are any questions or concerns please feel free to contact the dictating provider for clarification.       Callie Coelho PA-C  06/17/19 2672

## 2019-06-17 NOTE — ED NOTES
On arrival to room to discharge pt pt gone. Torey Gamboa PA-C states pt left without d/c instructions.      Jaron Plascencia RN  06/17/19 [id number]

## 2019-08-09 ENCOUNTER — HOSPITAL ENCOUNTER (EMERGENCY)
Age: 29
Discharge: HOME | End: 2019-08-09
Payer: COMMERCIAL

## 2019-08-09 VITALS
HEART RATE: 76 BPM | TEMPERATURE: 98.3 F | BODY MASS INDEX: 20.77 KG/M2 | DIASTOLIC BLOOD PRESSURE: 78 MMHG | OXYGEN SATURATION: 96 % | RESPIRATION RATE: 16 BRPM | HEIGHT: 61 IN | WEIGHT: 110 LBS | SYSTOLIC BLOOD PRESSURE: 100 MMHG

## 2019-08-09 DIAGNOSIS — A60.04 HERPES SIMPLEX VULVOVAGINITIS: Primary | ICD-10-CM

## 2019-08-09 PROCEDURE — 99282 EMERGENCY DEPT VISIT SF MDM: CPT

## 2019-08-09 RX ORDER — OXYCODONE HYDROCHLORIDE AND ACETAMINOPHEN 5; 325 MG/1; MG/1
1 TABLET ORAL EVERY 6 HOURS PRN
Qty: 6 TABLET | Refills: 0 | Status: SHIPPED | OUTPATIENT
Start: 2019-08-09 | End: 2019-08-12

## 2019-08-09 RX ORDER — LIDOCAINE 50 MG/G
OINTMENT TOPICAL
Qty: 30 G | Refills: 0 | Status: SHIPPED | OUTPATIENT
Start: 2019-08-09 | End: 2019-10-25

## 2019-08-09 RX ORDER — VALACYCLOVIR HYDROCHLORIDE 500 MG/1
500 TABLET, FILM COATED ORAL 3 TIMES DAILY
Qty: 30 TABLET | Refills: 1 | Status: SHIPPED | OUTPATIENT
Start: 2019-08-09 | End: 2019-08-14

## 2019-08-09 ASSESSMENT — PAIN DESCRIPTION - LOCATION: LOCATION: VAGINA

## 2019-08-09 ASSESSMENT — PAIN DESCRIPTION - PAIN TYPE: TYPE: ACUTE PAIN

## 2019-08-09 ASSESSMENT — PAIN SCALES - GENERAL
PAINLEVEL_OUTOF10: 8
PAINLEVEL_OUTOF10: 8

## 2019-08-09 NOTE — ED NOTES
Pt reports painful lesions to vaginal area. Noticed the first one yesterday, more today.      Luiza Diaz RN  08/09/19 3146

## 2019-08-12 ENCOUNTER — HOSPITAL ENCOUNTER (EMERGENCY)
Age: 29
Discharge: HOME | End: 2019-08-12
Payer: COMMERCIAL

## 2019-08-12 VITALS
TEMPERATURE: 98 F | RESPIRATION RATE: 12 BRPM | HEART RATE: 90 BPM | HEIGHT: 61 IN | WEIGHT: 110 LBS | DIASTOLIC BLOOD PRESSURE: 80 MMHG | SYSTOLIC BLOOD PRESSURE: 109 MMHG | OXYGEN SATURATION: 98 % | BODY MASS INDEX: 20.77 KG/M2

## 2019-08-12 DIAGNOSIS — N89.8 VAGINAL DISCHARGE: Primary | ICD-10-CM

## 2019-08-12 PROCEDURE — 6370000000 HC RX 637 (ALT 250 FOR IP): Performed by: PHYSICIAN ASSISTANT

## 2019-08-12 PROCEDURE — 99283 EMERGENCY DEPT VISIT LOW MDM: CPT

## 2019-08-12 PROCEDURE — 2500000003 HC RX 250 WO HCPCS: Performed by: PHYSICIAN ASSISTANT

## 2019-08-12 PROCEDURE — 6360000002 HC RX W HCPCS: Performed by: PHYSICIAN ASSISTANT

## 2019-08-12 PROCEDURE — 96372 THER/PROPH/DIAG INJ SC/IM: CPT

## 2019-08-12 RX ORDER — METRONIDAZOLE 500 MG/1
500 TABLET ORAL 2 TIMES DAILY
Qty: 14 TABLET | Refills: 0 | Status: SHIPPED | OUTPATIENT
Start: 2019-08-12 | End: 2019-08-19

## 2019-08-12 RX ORDER — AZITHROMYCIN 250 MG/1
1000 TABLET, FILM COATED ORAL ONCE
Status: COMPLETED | OUTPATIENT
Start: 2019-08-12 | End: 2019-08-12

## 2019-08-12 RX ADMIN — AZITHROMYCIN 1000 MG: 250 TABLET, FILM COATED ORAL at 15:31

## 2019-08-12 RX ADMIN — LIDOCAINE HYDROCHLORIDE 250 MG: 10 INJECTION, SOLUTION EPIDURAL; INFILTRATION; INTRACAUDAL; PERINEURAL at 15:31

## 2019-08-12 ASSESSMENT — PAIN DESCRIPTION - DESCRIPTORS: DESCRIPTORS: SHARP;CONSTANT

## 2019-08-12 ASSESSMENT — PAIN DESCRIPTION - FREQUENCY: FREQUENCY: CONTINUOUS

## 2019-08-12 ASSESSMENT — PAIN DESCRIPTION - ONSET: ONSET: ON-GOING

## 2019-08-12 ASSESSMENT — PAIN DESCRIPTION - ORIENTATION: ORIENTATION: OUTER

## 2019-08-12 ASSESSMENT — PAIN SCALES - GENERAL: PAINLEVEL_OUTOF10: 7

## 2019-08-12 ASSESSMENT — PAIN DESCRIPTION - LOCATION: LOCATION: VAGINA

## 2019-08-12 NOTE — ED PROVIDER NOTES
may contain errors related to that system including errors in grammar, punctuation, and spelling, as well as words and phrases that may be inappropriate. If there are any questions or concerns please feel free to contact the dictating provider for clarification.        Blanca Jordan, [address]  08/12/19 4452

## 2019-08-26 ENCOUNTER — HOSPITAL ENCOUNTER (EMERGENCY)
Age: 29
Discharge: HOME | End: 2019-08-26
Payer: COMMERCIAL

## 2019-08-26 VITALS
HEIGHT: 61 IN | SYSTOLIC BLOOD PRESSURE: 121 MMHG | DIASTOLIC BLOOD PRESSURE: 80 MMHG | OXYGEN SATURATION: 99 % | RESPIRATION RATE: 16 BRPM | WEIGHT: 108 LBS | HEART RATE: 76 BPM | TEMPERATURE: 98.2 F | BODY MASS INDEX: 20.39 KG/M2

## 2019-08-26 DIAGNOSIS — K06.8 PAIN OF GINGIVA: ICD-10-CM

## 2019-08-26 DIAGNOSIS — Z98.890 HISTORY OF RECENT DENTAL PROCEDURE: Primary | ICD-10-CM

## 2019-08-26 DIAGNOSIS — R11.0 NAUSEA: ICD-10-CM

## 2019-08-26 PROCEDURE — 6370000000 HC RX 637 (ALT 250 FOR IP): Performed by: PHYSICIAN ASSISTANT

## 2019-08-26 PROCEDURE — 99282 EMERGENCY DEPT VISIT SF MDM: CPT

## 2019-08-26 RX ORDER — OXYCODONE HYDROCHLORIDE AND ACETAMINOPHEN 5; 325 MG/1; MG/1
1 TABLET ORAL ONCE
Status: COMPLETED | OUTPATIENT
Start: 2019-08-26 | End: 2019-08-26

## 2019-08-26 RX ORDER — AMOXICILLIN AND CLAVULANATE POTASSIUM 875; 125 MG/1; MG/1
1 TABLET, FILM COATED ORAL 2 TIMES DAILY
Qty: 20 TABLET | Refills: 0 | Status: SHIPPED | OUTPATIENT
Start: 2019-08-26 | End: 2019-09-05

## 2019-08-26 RX ORDER — ONDANSETRON 4 MG/1
4 TABLET, ORALLY DISINTEGRATING ORAL EVERY 8 HOURS PRN
Qty: 15 TABLET | Refills: 0 | Status: SHIPPED | OUTPATIENT
Start: 2019-08-26 | End: 2019-11-18 | Stop reason: ALTCHOICE

## 2019-08-26 RX ADMIN — OXYCODONE HYDROCHLORIDE AND ACETAMINOPHEN 1 TABLET: 5; 325 TABLET ORAL at 13:40

## 2019-08-26 ASSESSMENT — PAIN DESCRIPTION - ORIENTATION: ORIENTATION: RIGHT;UPPER

## 2019-08-26 ASSESSMENT — PAIN SCALES - GENERAL
PAINLEVEL_OUTOF10: 10
PAINLEVEL_OUTOF10: 10

## 2019-08-26 ASSESSMENT — PAIN DESCRIPTION - DESCRIPTORS: DESCRIPTORS: CONSTANT

## 2019-08-26 ASSESSMENT — PAIN DESCRIPTION - PAIN TYPE: TYPE: ACUTE PAIN

## 2019-08-26 ASSESSMENT — PAIN DESCRIPTION - LOCATION: LOCATION: TEETH

## 2019-08-26 NOTE — ED PROVIDER NOTES
EMERGENCY DEPARTMENT ENCOUNTER      PCP: No primary care provider on file. CHIEF COMPLAINT    Chief Complaint   Patient presents with    Dental Pain     right upper       HPI    Jen Marrero is a 34 y.o. female who presents with right canine tooth pain. Onset was about 1 week ago post dental surgery for removal of right canine tooth due to infection. Duration has been constant since the onset and has not seem to resolved or improved. Location is right canine. Characteristic of pain is described as sharp. Describes pain 10/10. Aggravating factors are eating and palpation. Patient denies alleviating factors. Patient was given amoxicillin x 10 days, percocet, and lidocaine gel by dentist; patient states that she has also tried tylenol. She states that pain still has not improved. Patient has associated symptoms of nausea and decrease appetite due to pain. Pain does not radiate. Patient reports that she is not sure what dental clinic performed her surgery what day exactly her surgery was performed. Patient admits to history of smoking and has smoked since dental procedure. Patient denies fever, chills, difficulty swallowing, shortness of breath, tongue swelling, lip swelling, ear pain, sore throat, nasal congestion, facial redness or facial swelling. REVIEW OF SYSTEMS    General: Denies Fever, Denies Chills, Denies syncope  ENT:  See HPI  Respiratory: Denies difficulty breathing, wheezes. GI: + nausea; Denies vomiting. Lymphatics:  No swollen nodes, red streaks  Skin:  No rash.     All other review of systems are negative  See HPI and nursing notes for additional information     PAST MEDICAL & SURGICAL HISTORY    Past Medical History:   Diagnosis Date    Anxiety     Anxiety attack     Asthma     No Pulmonologist At This Time    Bipolar disorder ([facility] [address]     Bronchitis Last Episode In 2007    Chronic back pain     Depression     Endometriosis     Genital herpes complicating pregnancy in antepartum condition 2015    High risk pregnancy due to maternal drug abuse 2015    HSV-2 (herpes simplex virus 2) infection Last Episode 4-16    IBS (irritable bowel syndrome)     Panic attacks     Pelvic pain in female     Pneumonia Last Episode In [address]    PTSD (post-traumatic stress disorder)     \"They Dx. Me With PTSD After My Mom Passed Away When I Was 16\"    Restless leg     Supervision of other high-risk pregnancy(V23.89) 2015    Teeth missing     Upper And Lower    UTI (urinary tract infection) In Past    No Current Symptoms    Wears partial dentures     Upper     Past Surgical History:   Procedure Laterality Date    ABDOMINAL EXPLORATION SURGERY       SECTION  1-6-15     SECTION      DENTAL SURGERY      Teeth Extracted In Past    ENDOMETRIAL ABLATION  9-15, 2000 [place]     HYSTERECTOMY  2016    Total laparoscopic hysterectomy, BSO, cysto    HYSTERECTOMY, TOTAL ABDOMINAL         CURRENT MEDICATIONS    Current Outpatient Rx   Medication Sig Dispense Refill    benzocaine (LOLLICAINE) 20 % SWAB dental swab Apply one swab to fractured tooth / gumline every 3-4 hours when necessary pain 20 each 0    amoxicillin-clavulanate (AUGMENTIN) 875-125 MG per tablet Take 1 tablet by mouth 2 times daily for 10 days 20 tablet 0    ondansetron (ZOFRAN ODT) 4 MG disintegrating tablet Take 1 tablet by mouth every 8 hours as needed for Nausea 15 tablet 0    lidocaine (XYLOCAINE) 5 % ointment Apply topically as needed.  30 g 0    ibuprofen (ADVIL;MOTRIN) 600 MG tablet Take 600 mg by mouth every 6 hours as needed for Pain      famotidine (PEPCID) 20 MG tablet Take 1 tablet by mouth 2 times daily 14 tablet 0    dicyclomine (BENTYL) 10 MG capsule Take 1 capsule by mouth 3 times daily As needed for abdominal pain 15 capsule 3    acetaminophen (TYLENOL) 325 MG tablet Take 2 tablets by mouth every 6 hours as needed for Pain 120 tablet 3    lidocaine (XYLOCAINE) 5 % Talks on phone: None     Gets together: None     Attends Restoration service: None     Active member of club or organization: None     Attends meetings of clubs or organizations: None     Relationship status: None    Intimate partner violence:     Fear of current or ex partner: None     Emotionally abused: None     Physically abused: None     Forced sexual activity: None   Other Topics Concern    None   Social History Narrative    ** Merged History Encounter **          Family History   Problem Relation Age of Onset    Early Death Mother         45 Or 44    Arthritis Mother     High Blood Pressure Mother     Substance Abuse Father         \"Crackhead\"    Mental Illness Sister         \"Split Personality, Anxiety\"    High Blood Pressure Sister     Heart Disease Brother     Depression Sister     Mental Illness Sister         Anxiety, PTSD       PHYSICAL EXAM    VITAL SIGNS: /80   Pulse 76   Temp 98.2 °F (36.8 °C) (Oral)   Resp 16   Ht 5' 1\" (1.549 m)   Wt 108 lb (49 kg)   LMP  (Approximate)   SpO2 99%   BMI 20.41 kg/m²    Constitutional:  Well developed, well nourished, no acute distress   HENT:  Atraumatic, moist mucus membranes. EOMI. No proptosis. Ear canals and TMs clear bilateral.  There is no maxillary sinus tenderness to percussion or redness/warmth. Neck/Lymphatics: supple, no JVD, no swollen nodes   Musculoskeletal:  No edema, no deformities  Integument:  Skin warm and dry, no petechiae   Neurologic:  Alert & oriented, no slurred speech  Abdomen: NTND, soft  Psych: Pleasant affect, no hallucinations    Dental:       Tooth #6 is absent with a partial clot present but there does appear to be some dry/gray appearing gingiva with area of hyper erythema of the gingiva. There is tenderness to palpation of the region where tooth #6 would be. Dentition poor throughout. Gingival buccal interface without obvious mass or discoloration, or fluctuance to palpation.   No sublingual swelling

## 2019-09-06 ENCOUNTER — HOSPITAL ENCOUNTER (EMERGENCY)
Age: 29
Discharge: HOME | End: 2019-09-06
Payer: COMMERCIAL

## 2019-09-06 VITALS
RESPIRATION RATE: 18 BRPM | HEART RATE: 95 BPM | DIASTOLIC BLOOD PRESSURE: 84 MMHG | TEMPERATURE: 98.3 F | WEIGHT: 110 LBS | SYSTOLIC BLOOD PRESSURE: 113 MMHG | HEIGHT: 62 IN | OXYGEN SATURATION: 97 % | BODY MASS INDEX: 20.24 KG/M2

## 2019-09-06 DIAGNOSIS — J06.9 VIRAL UPPER RESPIRATORY TRACT INFECTION WITH COUGH: Primary | ICD-10-CM

## 2019-09-06 DIAGNOSIS — S39.012A STRAIN OF LUMBAR REGION, INITIAL ENCOUNTER: ICD-10-CM

## 2019-09-06 DIAGNOSIS — J40 BRONCHITIS: ICD-10-CM

## 2019-09-06 PROCEDURE — 6370000000 HC RX 637 (ALT 250 FOR IP): Performed by: PHYSICIAN ASSISTANT

## 2019-09-06 PROCEDURE — 94640 AIRWAY INHALATION TREATMENT: CPT

## 2019-09-06 PROCEDURE — 99282 EMERGENCY DEPT VISIT SF MDM: CPT

## 2019-09-06 RX ORDER — ALBUTEROL SULFATE 90 UG/1
2 AEROSOL, METERED RESPIRATORY (INHALATION) EVERY 4 HOURS PRN
Qty: 1 INHALER | Refills: 1 | Status: SHIPPED | OUTPATIENT
Start: 2019-09-06 | End: 2020-10-09 | Stop reason: ALTCHOICE

## 2019-09-06 RX ORDER — IPRATROPIUM BROMIDE AND ALBUTEROL SULFATE 2.5; .5 MG/3ML; MG/3ML
1 SOLUTION RESPIRATORY (INHALATION) ONCE
Status: COMPLETED | OUTPATIENT
Start: 2019-09-06 | End: 2019-09-06

## 2019-09-06 RX ORDER — METHOCARBAMOL 500 MG/1
500 TABLET, FILM COATED ORAL 3 TIMES DAILY
Qty: 21 TABLET | Refills: 0 | Status: SHIPPED | OUTPATIENT
Start: 2019-09-06 | End: 2019-09-13

## 2019-09-06 RX ORDER — PREDNISONE 20 MG/1
TABLET ORAL
Qty: 18 TABLET | Refills: 0 | Status: SHIPPED | OUTPATIENT
Start: 2019-09-06 | End: 2019-10-25

## 2019-09-06 RX ORDER — PROMETHAZINE HYDROCHLORIDE AND PHENYLEPHRINE HYDROCHLORIDE 6.25; 5 MG/5ML; MG/5ML
5 SYRUP ORAL EVERY 6 HOURS
Qty: 118 ML | Refills: 0 | Status: SHIPPED | OUTPATIENT
Start: 2019-09-06 | End: 2019-10-25

## 2019-09-06 RX ORDER — PREDNISONE 20 MG/1
60 TABLET ORAL ONCE
Status: COMPLETED | OUTPATIENT
Start: 2019-09-06 | End: 2019-09-06

## 2019-09-06 RX ADMIN — IPRATROPIUM BROMIDE AND ALBUTEROL SULFATE 1 AMPULE: .5; 3 SOLUTION RESPIRATORY (INHALATION) at 12:51

## 2019-09-06 RX ADMIN — PREDNISONE 60 MG: 20 TABLET ORAL at 12:29

## 2019-09-06 ASSESSMENT — PAIN DESCRIPTION - DESCRIPTORS: DESCRIPTORS: CONSTANT

## 2019-09-06 ASSESSMENT — PAIN DESCRIPTION - ORIENTATION: ORIENTATION: RIGHT;LOWER

## 2019-09-06 ASSESSMENT — PAIN DESCRIPTION - PAIN TYPE: TYPE: ACUTE PAIN

## 2019-09-06 ASSESSMENT — PAIN SCALES - GENERAL: PAINLEVEL_OUTOF10: 6

## 2019-09-06 ASSESSMENT — PAIN DESCRIPTION - LOCATION: LOCATION: BACK

## 2019-09-06 NOTE — ED PROVIDER NOTES
tablets by mouth every 6 hours as needed for Pain 120 tablet 3    lidocaine (XYLOCAINE) 5 % ointment Apply topically to area of pain 1-2 per day.  10 g 0    estradiol (ESTRACE) 1 MG tablet   11    albuterol sulfate  (90 Base) MCG/ACT inhaler Inhale 2 puffs into the lungs every 6 hours as needed for Wheezing or Shortness of Breath (or cough) 1 Inhaler 0       ALLERGIES    Allergies   Allergen Reactions    Latex Rash    Bactrim [Sulfamethoxazole-Trimethoprim] Nausea And Vomiting    Ciprofloxacin Nausea And Vomiting    Codeine Itching and Nausea And Vomiting    Toradol [Ketorolac Tromethamine] Itching, Nausea And Vomiting and Rash     \"Migraines\"    Tramadol Itching, Nausea And Vomiting and Rash     \"Migraines\"    Vicodin [Hydrocodone-Acetaminophen] Itching and Nausea And Vomiting     \"Anxiety Gets Really Bad\"    Vistaril [Hydroxyzine Hcl] Itching     \"I Pass Out\"    Bactrim [Sulfamethoxazole-Trimethoprim]     Ciprofloxacin     Codeine     Toradol [Ketorolac Tromethamine]     Tramadol     Vicodin [Hydrocodone-Acetaminophen]     Vistaril [Hydroxyzine Hcl]        SOCIAL & FAMILY HISTORY    Social History     Socioeconomic History    Marital status: Single     Spouse name: None    Number of children: None    Years of education: None    Highest education level: None   Occupational History    None   Social Needs    Financial resource strain: None    Food insecurity:     Worry: None     Inability: None    Transportation needs:     Medical: None     Non-medical: None   Tobacco Use    Smoking status: Current Every Day Smoker     Packs/day: 0.50     Years: 11.00     Pack years: 5.50     Types: Cigarettes    Smokeless tobacco: Never Used   Substance and Sexual Activity    Alcohol use: Not Currently     Comment: occ    Drug use: Yes     Types: Marijuana     Comment: \"Smoke Marijuana Maybe Once A Month\"    Sexual activity: Not Currently   Lifestyle    Physical activity:     Days per week: None Neurologic:  Alert & oriented, no slurred speech  Psych: Pleasant affect    RADIOLOGY   No results found. ED COURSE & MEDICAL DECISION MAKING      Patient presents as above. Has had a worsening cough that has led to posttussis emesis as well as a right sided lower back strain. Patient just finished a course of antibiotic yesterday. Low suspicion for any bacterial component. I believe is likely secondary to bronchitis and her continued smoking. Low suspicion for an atypical infection. Or other acute pathology that would warrant further testing. Did offer patient x-ray which she ultimately refused. At this point we will place her on a taper of steroid, cough medication. We will encourage the importance of hydration, sleep. Advised on worrisome symptoms of when to return back to the ED. Pt given Rx for strong cough medication, steroids, follow-up with PCP, and discharge instructions. Patient agrees to return to emergency Department if symptoms worsen or any new symptoms develop. Vital signs and nursing notes reviewed during ED course. All pertinent Lab data and radiographic results reviewed with patient at bedside. The patient and/or the family were informed of the results of any tests/labs/imaging, the treatment plan, and time was allotted to answer questions. Clinical  IMPRESSION    1. Viral upper respiratory tract infection with cough    2. Bronchitis    3. Strain of lumbar region, initial encounter      Comment: Please note this report has been produced using speech recognition software and may contain errors related to that system including errors in grammar, punctuation, and spelling, as well as words and phrases that may be inappropriate. If there are any questions or concerns please feel free to contact the dictating provider for clarification.       Mk Reyes [address], PA  09/06/19 9832

## 2019-10-23 ENCOUNTER — HOSPITAL ENCOUNTER (EMERGENCY)
Age: 29
Discharge: HOME | End: 2019-10-23
Payer: COMMERCIAL

## 2019-10-23 VITALS
SYSTOLIC BLOOD PRESSURE: 106 MMHG | TEMPERATURE: 97.5 F | RESPIRATION RATE: 19 BRPM | WEIGHT: 108 LBS | DIASTOLIC BLOOD PRESSURE: 73 MMHG | HEIGHT: 62 IN | HEART RATE: 82 BPM | OXYGEN SATURATION: 98 % | BODY MASS INDEX: 19.88 KG/M2

## 2019-10-23 DIAGNOSIS — A60.04 HERPES SIMPLEX VULVOVAGINITIS: Primary | ICD-10-CM

## 2019-10-23 PROCEDURE — 99281 EMR DPT VST MAYX REQ PHY/QHP: CPT

## 2019-10-23 RX ORDER — OXYCODONE HYDROCHLORIDE AND ACETAMINOPHEN 5; 325 MG/1; MG/1
1 TABLET ORAL EVERY 6 HOURS PRN
Qty: 12 TABLET | Refills: 0 | Status: SHIPPED | OUTPATIENT
Start: 2019-10-23 | End: 2019-10-25

## 2019-10-23 RX ORDER — ACYCLOVIR 200 MG/1
400 CAPSULE ORAL 3 TIMES DAILY
Qty: 30 CAPSULE | Refills: 0 | Status: SHIPPED | OUTPATIENT
Start: 2019-10-23 | End: 2019-10-25

## 2019-10-23 ASSESSMENT — PAIN SCALES - GENERAL: PAINLEVEL_OUTOF10: 8

## 2019-10-25 ENCOUNTER — HOSPITAL ENCOUNTER (EMERGENCY)
Age: 29
Discharge: HOME | End: 2019-10-25
Payer: COMMERCIAL

## 2019-10-25 VITALS
HEART RATE: 97 BPM | BODY MASS INDEX: 20.39 KG/M2 | SYSTOLIC BLOOD PRESSURE: 114 MMHG | OXYGEN SATURATION: 100 % | TEMPERATURE: 97.9 F | DIASTOLIC BLOOD PRESSURE: 81 MMHG | RESPIRATION RATE: 16 BRPM | WEIGHT: 108 LBS | HEIGHT: 61 IN

## 2019-10-25 DIAGNOSIS — A60.04 HERPES SIMPLEX VULVOVAGINITIS: Primary | ICD-10-CM

## 2019-10-25 PROCEDURE — 99282 EMERGENCY DEPT VISIT SF MDM: CPT

## 2019-10-25 RX ORDER — ACYCLOVIR 800 MG/1
800 TABLET ORAL
Qty: 50 TABLET | Refills: 0 | Status: SHIPPED | OUTPATIENT
Start: 2019-10-25 | End: 2019-11-04

## 2019-10-25 RX ORDER — IBUPROFEN 600 MG/1
600 TABLET ORAL EVERY 6 HOURS PRN
Qty: 30 TABLET | Refills: 0 | Status: SHIPPED | OUTPATIENT
Start: 2019-10-25 | End: 2019-12-10 | Stop reason: ALTCHOICE

## 2019-10-25 RX ORDER — LIDOCAINE HYDROCHLORIDE 20 MG/ML
5 SOLUTION OROPHARYNGEAL PRN
Qty: 60 ML | Refills: 1 | Status: SHIPPED | OUTPATIENT
Start: 2019-10-25 | End: 2019-11-18 | Stop reason: ALTCHOICE

## 2019-10-25 RX ORDER — OXYCODONE HYDROCHLORIDE AND ACETAMINOPHEN 5; 325 MG/1; MG/1
1 TABLET ORAL EVERY 8 HOURS PRN
Qty: 10 TABLET | Refills: 0 | Status: SHIPPED | OUTPATIENT
Start: 2019-10-25 | End: 2019-10-30

## 2019-10-25 ASSESSMENT — PAIN SCALES - GENERAL: PAINLEVEL_OUTOF10: 8

## 2019-10-25 ASSESSMENT — PAIN DESCRIPTION - PAIN TYPE: TYPE: ACUTE PAIN

## 2019-11-06 ENCOUNTER — HOSPITAL ENCOUNTER (EMERGENCY)
Age: 29
Discharge: HOME | End: 2019-11-06
Payer: COMMERCIAL

## 2019-11-06 VITALS
HEIGHT: 62 IN | SYSTOLIC BLOOD PRESSURE: 118 MMHG | TEMPERATURE: 97.7 F | OXYGEN SATURATION: 98 % | HEART RATE: 94 BPM | WEIGHT: 108 LBS | RESPIRATION RATE: 16 BRPM | DIASTOLIC BLOOD PRESSURE: 81 MMHG | BODY MASS INDEX: 19.88 KG/M2

## 2019-11-06 DIAGNOSIS — A60.04: Primary | ICD-10-CM

## 2019-11-06 PROCEDURE — 99282 EMERGENCY DEPT VISIT SF MDM: CPT

## 2019-11-06 PROCEDURE — 6370000000 HC RX 637 (ALT 250 FOR IP): Performed by: PHYSICIAN ASSISTANT

## 2019-11-06 RX ORDER — OXYCODONE HYDROCHLORIDE AND ACETAMINOPHEN 5; 325 MG/1; MG/1
1 TABLET ORAL ONCE
Status: COMPLETED | OUTPATIENT
Start: 2019-11-06 | End: 2019-11-06

## 2019-11-06 RX ORDER — ACYCLOVIR 400 MG/1
800 TABLET ORAL 2 TIMES DAILY
Qty: 20 TABLET | Refills: 0 | Status: SHIPPED | OUTPATIENT
Start: 2019-11-06 | End: 2019-11-11

## 2019-11-06 RX ORDER — OXYCODONE HYDROCHLORIDE AND ACETAMINOPHEN 5; 325 MG/1; MG/1
1 TABLET ORAL EVERY 8 HOURS PRN
Qty: 9 TABLET | Refills: 0 | Status: SHIPPED | OUTPATIENT
Start: 2019-11-06 | End: 2019-11-09

## 2019-11-06 RX ADMIN — OXYCODONE HYDROCHLORIDE AND ACETAMINOPHEN 1 TABLET: 5; 325 TABLET ORAL at 10:27

## 2019-11-06 ASSESSMENT — PAIN SCALES - GENERAL: PAINLEVEL_OUTOF10: 7

## 2019-11-14 ENCOUNTER — HOSPITAL ENCOUNTER (EMERGENCY)
Age: 29
Discharge: HOME | End: 2019-11-14
Payer: COMMERCIAL

## 2019-11-14 VITALS
HEART RATE: 108 BPM | WEIGHT: 108 LBS | BODY MASS INDEX: 20.39 KG/M2 | OXYGEN SATURATION: 98 % | DIASTOLIC BLOOD PRESSURE: 84 MMHG | RESPIRATION RATE: 16 BRPM | HEIGHT: 61 IN | TEMPERATURE: 98 F | SYSTOLIC BLOOD PRESSURE: 114 MMHG

## 2019-11-14 DIAGNOSIS — A60.04 HERPES SIMPLEX VULVOVAGINITIS: Primary | ICD-10-CM

## 2019-11-14 PROCEDURE — 99282 EMERGENCY DEPT VISIT SF MDM: CPT

## 2019-11-14 RX ORDER — ACYCLOVIR 200 MG/1
800 CAPSULE ORAL
Qty: 140 CAPSULE | Refills: 0 | Status: SHIPPED | OUTPATIENT
Start: 2019-11-14 | End: 2019-11-18 | Stop reason: ALTCHOICE

## 2019-11-14 ASSESSMENT — PAIN SCALES - GENERAL: PAINLEVEL_OUTOF10: 10

## 2019-11-18 ENCOUNTER — HOSPITAL ENCOUNTER (EMERGENCY)
Age: 29
Discharge: HOME | End: 2019-11-18
Payer: COMMERCIAL

## 2019-11-18 VITALS
HEART RATE: 106 BPM | OXYGEN SATURATION: 99 % | HEIGHT: 61 IN | WEIGHT: 108 LBS | SYSTOLIC BLOOD PRESSURE: 129 MMHG | RESPIRATION RATE: 16 BRPM | TEMPERATURE: 98.4 F | BODY MASS INDEX: 20.39 KG/M2 | DIASTOLIC BLOOD PRESSURE: 77 MMHG

## 2019-11-18 DIAGNOSIS — A60.04: Primary | ICD-10-CM

## 2019-11-18 PROCEDURE — 99283 EMERGENCY DEPT VISIT LOW MDM: CPT

## 2019-11-18 RX ORDER — VALACYCLOVIR HYDROCHLORIDE 1 G/1
1000 TABLET, FILM COATED ORAL 2 TIMES DAILY
Qty: 14 TABLET | Refills: 0 | Status: SHIPPED | OUTPATIENT
Start: 2019-11-18 | End: 2019-11-25

## 2019-11-18 ASSESSMENT — PAIN DESCRIPTION - FREQUENCY: FREQUENCY: CONTINUOUS

## 2019-11-18 ASSESSMENT — PAIN DESCRIPTION - DESCRIPTORS: DESCRIPTORS: ACHING;BURNING

## 2019-11-18 ASSESSMENT — PAIN DESCRIPTION - PAIN TYPE: TYPE: ACUTE PAIN

## 2019-11-18 ASSESSMENT — PAIN SCALES - GENERAL: PAINLEVEL_OUTOF10: 8

## 2019-11-18 ASSESSMENT — PAIN DESCRIPTION - LOCATION: LOCATION: VAGINA

## 2019-12-10 ENCOUNTER — HOSPITAL ENCOUNTER (EMERGENCY)
Age: 29
Discharge: HOME | End: 2019-12-10
Payer: COMMERCIAL

## 2019-12-10 ENCOUNTER — APPOINTMENT (OUTPATIENT)
Dept: GENERAL RADIOLOGY | Age: 29
End: 2019-12-10
Payer: COMMERCIAL

## 2019-12-10 VITALS
WEIGHT: 112 LBS | TEMPERATURE: 100.3 F | HEART RATE: 100 BPM | BODY MASS INDEX: 20.61 KG/M2 | OXYGEN SATURATION: 99 % | SYSTOLIC BLOOD PRESSURE: 107 MMHG | RESPIRATION RATE: 20 BRPM | HEIGHT: 62 IN | DIASTOLIC BLOOD PRESSURE: 69 MMHG

## 2019-12-10 DIAGNOSIS — J10.1 INFLUENZA A: Primary | ICD-10-CM

## 2019-12-10 LAB
ALBUMIN SERPL-MCNC: 5 GM/DL (ref 3.4–5)
ALP BLD-CCNC: 64 IU/L (ref 40–128)
ALT SERPL-CCNC: 10 U/L (ref 10–40)
ANION GAP SERPL CALCULATED.3IONS-SCNC: 17 MMOL/L (ref 4–16)
AST SERPL-CCNC: 23 IU/L (ref 15–37)
BACTERIA: NEGATIVE /HPF
BASOPHILS ABSOLUTE: 0 K/CU MM
BASOPHILS RELATIVE PERCENT: 0.4 % (ref 0–1)
BILIRUB SERPL-MCNC: 0.3 MG/DL (ref 0–1)
BILIRUBIN URINE: NEGATIVE MG/DL
BLOOD, URINE: ABNORMAL
BUN BLDV-MCNC: 9 MG/DL (ref 6–23)
CALCIUM SERPL-MCNC: 9.8 MG/DL (ref 8.3–10.6)
CHLORIDE BLD-SCNC: 95 MMOL/L (ref 99–110)
CLARITY: ABNORMAL
CO2: 23 MMOL/L (ref 21–32)
COLOR: YELLOW
CREAT SERPL-MCNC: 0.7 MG/DL (ref 0.6–1.1)
DIFFERENTIAL TYPE: ABNORMAL
EOSINOPHILS ABSOLUTE: 0 K/CU MM
EOSINOPHILS RELATIVE PERCENT: 0 % (ref 0–3)
GFR AFRICAN AMERICAN: >60 ML/MIN/1.73M2
GFR NON-AFRICAN AMERICAN: >60 ML/MIN/1.73M2
GLUCOSE BLD-MCNC: 142 MG/DL (ref 70–99)
GLUCOSE, URINE: NEGATIVE MG/DL
HCG QUALITATIVE: NEGATIVE
HCT VFR BLD CALC: 44.7 % (ref 37–47)
HEMOGLOBIN: 15.1 GM/DL (ref 12.5–16)
IMMATURE NEUTROPHIL %: 0.2 % (ref 0–0.43)
KETONES, URINE: ABNORMAL MG/DL
LEUKOCYTE ESTERASE, URINE: NEGATIVE
LYMPHOCYTES ABSOLUTE: 0.5 K/CU MM
LYMPHOCYTES RELATIVE PERCENT: 5 % (ref 24–44)
MCH RBC QN AUTO: 31.6 PG (ref 27–31)
MCHC RBC AUTO-ENTMCNC: 33.8 % (ref 32–36)
MCV RBC AUTO: 93.5 FL (ref 78–100)
MONOCYTES ABSOLUTE: 1.3 K/CU MM
MONOCYTES RELATIVE PERCENT: 14.2 % (ref 0–4)
MUCUS: ABNORMAL HPF
NITRITE URINE, QUANTITATIVE: NEGATIVE
NUCLEATED RBC %: 0 %
PDW BLD-RTO: 13.5 % (ref 11.7–14.9)
PH, URINE: 5 (ref 5–8)
PLATELET # BLD: 278 K/CU MM (ref 140–440)
PMV BLD AUTO: 10.4 FL (ref 7.5–11.1)
POTASSIUM SERPL-SCNC: 3.3 MMOL/L (ref 3.5–5.1)
PROTEIN UA: 30 MG/DL
RAPID INFLUENZA  B AGN: NEGATIVE
RAPID INFLUENZA A AGN: POSITIVE
RBC # BLD: 4.78 M/CU MM (ref 4.2–5.4)
RBC URINE: 1 /HPF (ref 0–6)
SEGMENTED NEUTROPHILS ABSOLUTE COUNT: 7.3 K/CU MM
SEGMENTED NEUTROPHILS RELATIVE PERCENT: 80.2 % (ref 36–66)
SODIUM BLD-SCNC: 135 MMOL/L (ref 135–145)
SPECIFIC GRAVITY UA: 1.02 (ref 1–1.03)
SQUAMOUS EPITHELIAL: 4 /HPF
TOTAL IMMATURE NEUTOROPHIL: 0.02 K/CU MM
TOTAL NUCLEATED RBC: 0 K/CU MM
TOTAL PROTEIN: 7.9 GM/DL (ref 6.4–8.2)
TRICHOMONAS: ABNORMAL /HPF
UROBILINOGEN, URINE: 1 MG/DL (ref 0.2–1)
WBC # BLD: 9.1 K/CU MM (ref 4–10.5)
WBC UA: 1 /HPF (ref 0–5)

## 2019-12-10 PROCEDURE — 71046 X-RAY EXAM CHEST 2 VIEWS: CPT

## 2019-12-10 PROCEDURE — 81001 URINALYSIS AUTO W/SCOPE: CPT

## 2019-12-10 PROCEDURE — 6360000002 HC RX W HCPCS: Performed by: PHYSICIAN ASSISTANT

## 2019-12-10 PROCEDURE — 85025 COMPLETE CBC W/AUTO DIFF WBC: CPT

## 2019-12-10 PROCEDURE — 84703 CHORIONIC GONADOTROPIN ASSAY: CPT

## 2019-12-10 PROCEDURE — 87804 INFLUENZA ASSAY W/OPTIC: CPT

## 2019-12-10 PROCEDURE — 2580000003 HC RX 258: Performed by: PHYSICIAN ASSISTANT

## 2019-12-10 PROCEDURE — 6370000000 HC RX 637 (ALT 250 FOR IP): Performed by: PHYSICIAN ASSISTANT

## 2019-12-10 PROCEDURE — 87081 CULTURE SCREEN ONLY: CPT

## 2019-12-10 PROCEDURE — 94640 AIRWAY INHALATION TREATMENT: CPT

## 2019-12-10 PROCEDURE — 99283 EMERGENCY DEPT VISIT LOW MDM: CPT

## 2019-12-10 PROCEDURE — 87430 STREP A AG IA: CPT

## 2019-12-10 PROCEDURE — 80053 COMPREHEN METABOLIC PANEL: CPT

## 2019-12-10 PROCEDURE — 94761 N-INVAS EAR/PLS OXIMETRY MLT: CPT

## 2019-12-10 RX ORDER — IBUPROFEN 400 MG/1
400 TABLET ORAL EVERY 6 HOURS PRN
Qty: 30 TABLET | Refills: 0 | Status: SHIPPED | OUTPATIENT
Start: 2019-12-10 | End: 2020-03-28

## 2019-12-10 RX ORDER — ACETAMINOPHEN 325 MG/1
650 TABLET ORAL EVERY 6 HOURS PRN
Qty: 120 TABLET | Refills: 3 | Status: SHIPPED | OUTPATIENT
Start: 2019-12-10 | End: 2019-12-10 | Stop reason: SDUPTHER

## 2019-12-10 RX ORDER — OSELTAMIVIR PHOSPHATE 75 MG/1
75 CAPSULE ORAL 2 TIMES DAILY
Qty: 10 CAPSULE | Refills: 0 | Status: SHIPPED | OUTPATIENT
Start: 2019-12-10 | End: 2019-12-15

## 2019-12-10 RX ORDER — 0.9 % SODIUM CHLORIDE 0.9 %
1000 INTRAVENOUS SOLUTION INTRAVENOUS ONCE
Status: COMPLETED | OUTPATIENT
Start: 2019-12-10 | End: 2019-12-10

## 2019-12-10 RX ORDER — OSELTAMIVIR PHOSPHATE 75 MG/1
75 CAPSULE ORAL ONCE
Status: COMPLETED | OUTPATIENT
Start: 2019-12-10 | End: 2019-12-10

## 2019-12-10 RX ORDER — ACETAMINOPHEN 325 MG/1
650 TABLET ORAL EVERY 6 HOURS PRN
Qty: 60 TABLET | Refills: 0 | Status: SHIPPED | OUTPATIENT
Start: 2019-12-10 | End: 2020-03-28

## 2019-12-10 RX ORDER — GUAIFENESIN/DEXTROMETHORPHAN 100-10MG/5
5 SYRUP ORAL 3 TIMES DAILY PRN
Qty: 120 ML | Refills: 0 | Status: SHIPPED | OUTPATIENT
Start: 2019-12-10 | End: 2019-12-18

## 2019-12-10 RX ORDER — ONDANSETRON 2 MG/ML
4 INJECTION INTRAMUSCULAR; INTRAVENOUS EVERY 30 MIN PRN
Status: DISCONTINUED | OUTPATIENT
Start: 2019-12-10 | End: 2019-12-10 | Stop reason: HOSPADM

## 2019-12-10 RX ORDER — ACETAMINOPHEN 500 MG
1000 TABLET ORAL ONCE
Status: COMPLETED | OUTPATIENT
Start: 2019-12-10 | End: 2019-12-10

## 2019-12-10 RX ORDER — IBUPROFEN 600 MG/1
600 TABLET ORAL ONCE
Status: COMPLETED | OUTPATIENT
Start: 2019-12-10 | End: 2019-12-10

## 2019-12-10 RX ORDER — IPRATROPIUM BROMIDE AND ALBUTEROL SULFATE 2.5; .5 MG/3ML; MG/3ML
1 SOLUTION RESPIRATORY (INHALATION) ONCE
Status: COMPLETED | OUTPATIENT
Start: 2019-12-10 | End: 2019-12-10

## 2019-12-10 RX ORDER — GUAIFENESIN/DEXTROMETHORPHAN 100-10MG/5
5 SYRUP ORAL ONCE
Status: COMPLETED | OUTPATIENT
Start: 2019-12-10 | End: 2019-12-10

## 2019-12-10 RX ADMIN — GUAIFENESIN AND DEXTROMETHORPHAN 5 ML: 100; 10 SYRUP ORAL at 10:09

## 2019-12-10 RX ADMIN — OSELTAMIVIR PHOSPHATE 75 MG: 75 CAPSULE ORAL at 11:30

## 2019-12-10 RX ADMIN — SODIUM CHLORIDE 1000 ML: 9 INJECTION, SOLUTION INTRAVENOUS at 11:31

## 2019-12-10 RX ADMIN — SODIUM CHLORIDE 1000 ML: 9 INJECTION, SOLUTION INTRAVENOUS at 10:04

## 2019-12-10 RX ADMIN — IPRATROPIUM BROMIDE AND ALBUTEROL SULFATE 1 AMPULE: .5; 3 SOLUTION RESPIRATORY (INHALATION) at 10:35

## 2019-12-10 RX ADMIN — ONDANSETRON 4 MG: 2 INJECTION INTRAMUSCULAR; INTRAVENOUS at 10:04

## 2019-12-10 RX ADMIN — IBUPROFEN 600 MG: 600 TABLET, FILM COATED ORAL at 12:23

## 2019-12-10 RX ADMIN — ACETAMINOPHEN 1000 MG: 500 TABLET ORAL at 10:09

## 2019-12-10 ASSESSMENT — PAIN DESCRIPTION - PAIN TYPE: TYPE: ACUTE PAIN

## 2019-12-10 ASSESSMENT — PAIN SCALES - GENERAL: PAINLEVEL_OUTOF10: 10

## 2019-12-10 ASSESSMENT — PAIN DESCRIPTION - LOCATION: LOCATION: GENERALIZED

## 2019-12-11 ENCOUNTER — HOSPITAL ENCOUNTER (EMERGENCY)
Age: 29
Discharge: HOME | End: 2019-12-11
Payer: COMMERCIAL

## 2019-12-11 VITALS
HEART RATE: 89 BPM | OXYGEN SATURATION: 98 % | BODY MASS INDEX: 21.14 KG/M2 | WEIGHT: 112 LBS | SYSTOLIC BLOOD PRESSURE: 109 MMHG | RESPIRATION RATE: 16 BRPM | HEIGHT: 61 IN | DIASTOLIC BLOOD PRESSURE: 77 MMHG | TEMPERATURE: 98 F

## 2019-12-11 DIAGNOSIS — R11.0 NAUSEA: ICD-10-CM

## 2019-12-11 DIAGNOSIS — J06.9 UPPER RESPIRATORY TRACT INFECTION, UNSPECIFIED TYPE: Primary | ICD-10-CM

## 2019-12-11 DIAGNOSIS — Z20.818 EXPOSURE TO STREP THROAT: ICD-10-CM

## 2019-12-11 PROCEDURE — 99283 EMERGENCY DEPT VISIT LOW MDM: CPT

## 2019-12-11 RX ORDER — ONDANSETRON 4 MG/1
4 TABLET, ORALLY DISINTEGRATING ORAL EVERY 6 HOURS
Qty: 10 TABLET | Refills: 0 | Status: SHIPPED | OUTPATIENT
Start: 2019-12-11 | End: 2019-12-11

## 2019-12-11 RX ORDER — AMOXICILLIN 500 MG/1
500 CAPSULE ORAL 3 TIMES DAILY
Qty: 21 CAPSULE | Refills: 0 | Status: SHIPPED | OUTPATIENT
Start: 2019-12-11 | End: 2019-12-18

## 2019-12-11 RX ORDER — ONDANSETRON 4 MG/1
4 TABLET, ORALLY DISINTEGRATING ORAL EVERY 6 HOURS
Qty: 10 TABLET | Refills: 0 | Status: SHIPPED | OUTPATIENT
Start: 2019-12-11 | End: 2020-10-09 | Stop reason: ALTCHOICE

## 2019-12-11 RX ORDER — AMOXICILLIN 500 MG/1
500 CAPSULE ORAL 3 TIMES DAILY
Qty: 21 CAPSULE | Refills: 0 | Status: SHIPPED | OUTPATIENT
Start: 2019-12-11 | End: 2019-12-11

## 2019-12-12 LAB
CULTURE: ABNORMAL
Lab: ABNORMAL
SPECIMEN: ABNORMAL
STREP A DIRECT SCREEN: NEGATIVE

## 2020-01-09 ENCOUNTER — APPOINTMENT (OUTPATIENT)
Dept: GENERAL RADIOLOGY | Age: 30
End: 2020-01-09
Payer: COMMERCIAL

## 2020-01-09 ENCOUNTER — HOSPITAL ENCOUNTER (EMERGENCY)
Age: 30
Discharge: HOME | End: 2020-01-09
Payer: COMMERCIAL

## 2020-01-09 VITALS
SYSTOLIC BLOOD PRESSURE: 118 MMHG | RESPIRATION RATE: 15 BRPM | DIASTOLIC BLOOD PRESSURE: 70 MMHG | WEIGHT: 112 LBS | OXYGEN SATURATION: 100 % | HEIGHT: 61 IN | HEART RATE: 97 BPM | BODY MASS INDEX: 21.14 KG/M2 | TEMPERATURE: 98 F

## 2020-01-09 PROCEDURE — 99283 EMERGENCY DEPT VISIT LOW MDM: CPT

## 2020-01-09 PROCEDURE — 96372 THER/PROPH/DIAG INJ SC/IM: CPT

## 2020-01-09 PROCEDURE — 73030 X-RAY EXAM OF SHOULDER: CPT

## 2020-01-09 PROCEDURE — 6360000002 HC RX W HCPCS: Performed by: PHYSICIAN ASSISTANT

## 2020-01-09 PROCEDURE — 73552 X-RAY EXAM OF FEMUR 2/>: CPT

## 2020-01-09 PROCEDURE — 73090 X-RAY EXAM OF FOREARM: CPT

## 2020-01-09 RX ORDER — METHOCARBAMOL 500 MG/1
500 TABLET, FILM COATED ORAL 4 TIMES DAILY
Qty: 20 TABLET | Refills: 0 | Status: SHIPPED | OUTPATIENT
Start: 2020-01-09 | End: 2020-10-02

## 2020-01-09 RX ORDER — ORPHENADRINE CITRATE 30 MG/ML
60 INJECTION INTRAMUSCULAR; INTRAVENOUS ONCE
Status: COMPLETED | OUTPATIENT
Start: 2020-01-09 | End: 2020-01-09

## 2020-01-09 RX ADMIN — ORPHENADRINE CITRATE 60 MG: 60 INJECTION INTRAMUSCULAR; INTRAVENOUS at 16:53

## 2020-01-09 ASSESSMENT — PAIN SCALES - GENERAL: PAINLEVEL_OUTOF10: 8

## 2020-01-09 ASSESSMENT — PAIN DESCRIPTION - PAIN TYPE: TYPE: ACUTE PAIN
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
## 2020-03-28 ENCOUNTER — HOSPITAL ENCOUNTER (EMERGENCY)
Age: 30
Discharge: HOME | End: 2020-03-28
Payer: COMMERCIAL

## 2020-03-28 VITALS
SYSTOLIC BLOOD PRESSURE: 113 MMHG | BODY MASS INDEX: 21.14 KG/M2 | HEIGHT: 61 IN | HEART RATE: 89 BPM | RESPIRATION RATE: 17 BRPM | TEMPERATURE: 98.2 F | DIASTOLIC BLOOD PRESSURE: 71 MMHG | WEIGHT: 112 LBS | OXYGEN SATURATION: 99 %

## 2020-03-28 PROCEDURE — 6370000000 HC RX 637 (ALT 250 FOR IP): Performed by: PHYSICIAN ASSISTANT

## 2020-03-28 PROCEDURE — 99282 EMERGENCY DEPT VISIT SF MDM: CPT

## 2020-03-28 RX ORDER — OXYCODONE HYDROCHLORIDE AND ACETAMINOPHEN 5; 325 MG/1; MG/1
1 TABLET ORAL ONCE
Status: COMPLETED | OUTPATIENT
Start: 2020-03-28 | End: 2020-03-28

## 2020-03-28 RX ORDER — AMOXICILLIN 500 MG/1
500 CAPSULE ORAL 3 TIMES DAILY
Qty: 30 CAPSULE | Refills: 0 | Status: SHIPPED | OUTPATIENT
Start: 2020-03-28 | End: 2020-04-07

## 2020-03-28 RX ORDER — ACETAMINOPHEN 500 MG
500 TABLET ORAL EVERY 6 HOURS PRN
Qty: 30 TABLET | Refills: 1 | Status: SHIPPED | OUTPATIENT
Start: 2020-03-28 | End: 2020-11-19

## 2020-03-28 RX ORDER — IBUPROFEN 600 MG/1
600 TABLET ORAL EVERY 6 HOURS PRN
Qty: 30 TABLET | Refills: 1 | Status: SHIPPED | OUTPATIENT
Start: 2020-03-28 | End: 2020-05-17

## 2020-03-28 RX ADMIN — OXYCODONE HYDROCHLORIDE AND ACETAMINOPHEN 1 TABLET: 5; 325 TABLET ORAL at 13:08

## 2020-03-28 ASSESSMENT — PAIN DESCRIPTION - PAIN TYPE: TYPE: ACUTE PAIN

## 2020-03-28 ASSESSMENT — PAIN SCALES - GENERAL
PAINLEVEL_OUTOF10: 8
PAINLEVEL_OUTOF10: 8

## 2020-03-28 ASSESSMENT — PAIN DESCRIPTION - LOCATION: LOCATION: TEETH

## 2020-03-28 NOTE — ED NOTES
Discharge instructions reviewed. Pt verbalized understanding.        Elaine Aleman, RN  03/28/20 7546

## 2020-04-03 ENCOUNTER — HOSPITAL ENCOUNTER (EMERGENCY)
Age: 30
Discharge: HOME | End: 2020-04-03
Attending: EMERGENCY MEDICINE
Payer: COMMERCIAL

## 2020-04-03 VITALS
HEART RATE: 92 BPM | RESPIRATION RATE: 16 BRPM | SYSTOLIC BLOOD PRESSURE: 118 MMHG | WEIGHT: 112 LBS | TEMPERATURE: 98.3 F | DIASTOLIC BLOOD PRESSURE: 64 MMHG | BODY MASS INDEX: 21.14 KG/M2 | HEIGHT: 61 IN | OXYGEN SATURATION: 100 %

## 2020-04-03 PROCEDURE — 6370000000 HC RX 637 (ALT 250 FOR IP): Performed by: EMERGENCY MEDICINE

## 2020-04-03 PROCEDURE — 99282 EMERGENCY DEPT VISIT SF MDM: CPT

## 2020-04-03 RX ORDER — NAPROXEN 500 MG/1
500 TABLET ORAL 2 TIMES DAILY PRN
Qty: 20 TABLET | Refills: 0 | Status: SHIPPED | OUTPATIENT
Start: 2020-04-03 | End: 2020-10-02

## 2020-04-03 RX ORDER — NAPROXEN 250 MG/1
500 TABLET ORAL ONCE
Status: COMPLETED | OUTPATIENT
Start: 2020-04-03 | End: 2020-04-03

## 2020-04-03 RX ADMIN — NAPROXEN 500 MG: 250 TABLET ORAL at 12:52

## 2020-04-03 ASSESSMENT — PAIN SCALES - GENERAL
PAINLEVEL_OUTOF10: 7
PAINLEVEL_OUTOF10: 6

## 2020-04-03 NOTE — ED PROVIDER NOTES
Emergency Department Encounter    Patient: Jeanne Bassett  MRN: [id number]  : 1990  Date of Evaluation: 4/3/2020  ED Provider:  Rafita Castillo    Triage Chief Complaint:   Dental Pain    [place]:  Jeanne Bassett is a 34 y.o. female that presents with concern for right lower back dental pain. Pain is 6 out of 10. She has a broken tooth with a cavity. Was supposed to see the dentist on Monday but the appointment had been canceled. She went to go today and they are closed. They are only open on  due to the coronavirus pandemic right now and they are only doing emergencies. She states she came here because \"I did not know what else to do\". She is already on amoxicillin, was seen on the  for same. She is not having any increased swelling. No fevers. No difficulty swallowing. She has used Orajel but states \"it burns so I do not like to use it\". She has used Magic mouthwash without relief. ROS - see HPI, below listed is current ROS at time of my eval:  4 systems reviewed and negative except as above/     Past Medical History:   Diagnosis Date    Anxiety     Anxiety attack     Asthma     No Pulmonologist At This Time    Bipolar disorder ([address]     Bronchitis Last Episode In     Chronic back pain     Depression     Endometriosis     Genital herpes complicating pregnancy in antepartum condition 2015    High risk pregnancy due to maternal drug abuse ([address] 2015    HSV-2 (herpes simplex virus 2) infection Last Episode 4-16    IBS (irritable bowel syndrome)     Panic attacks     Pelvic pain in female     Pneumonia Last Episode In  [place]     PTSD (post-traumatic stress disorder)     \"They Dx.  Me With PTSD After My Mom Passed Away When I Was 16\"    Restless leg     Supervision of other high-risk pregnancy(V23.89) 2015    Teeth missing     Upper And Lower    UTI (urinary tract infection) In Past    No Current Symptoms    Wears partial dentures     Upper Past Surgical History:   Procedure Laterality Date    ABDOMINAL EXPLORATION SURGERY       SECTION  1-6-15     SECTION      DENTAL SURGERY      Teeth Extracted In Past    ENDOMETRIAL ABLATION  9-15, 2000 [place]     HYSTERECTOMY  2016    Total laparoscopic hysterectomy, BSO, cysto    HYSTERECTOMY, TOTAL ABDOMINAL       Family History   Problem Relation Age of Onset    Early Death Mother         45 Or 44    Arthritis Mother     High Blood Pressure Mother     Substance Abuse Father         \"Crackhead\"    Mental Illness Sister         \"Split Personality, Anxiety\"    High Blood Pressure Sister     Heart Disease Brother     Depression Sister     Mental Illness Sister         Anxiety, PTSD     Social History     Socioeconomic History    Marital status: Single     Spouse name: Not on file    Number of children: Not on file    Years of education: Not on file    Highest education level: Not on file   Occupational History    Not on file   Social Needs    Financial resource strain: Not on file    Food insecurity     Worry: Not on file     Inability: Not on file    Transportation needs     Medical: Not on file     Non-medical: Not on file   Tobacco Use    Smoking status: Current Every Day Smoker     Packs/day: 0.50     Years: 11.00     Pack years: 5.50     Types: Cigarettes    Smokeless tobacco: Never Used   Substance and Sexual Activity    Alcohol use: Not Currently     Comment: occ    Drug use: Yes     Types: Marijuana     Comment: \"Smoke Marijuana Maybe Once A Month\"    Sexual activity: Not Currently   Lifestyle    Physical activity     Days per week: Not on file     Minutes per session: Not on file    Stress: Not on file   Relationships    Social connections     Talks on phone: Not on file     Gets together: Not on file     Attends Confucianism service: Not on file     Active member of club or organization: Not on file     Attends meetings of clubs or organizations: Not

## 2020-04-04 ENCOUNTER — HOSPITAL ENCOUNTER (EMERGENCY)
Age: 30
Discharge: HOME | End: 2020-04-05
Attending: EMERGENCY MEDICINE
Payer: COMMERCIAL

## 2020-04-04 VITALS
BODY MASS INDEX: 21.71 KG/M2 | RESPIRATION RATE: 18 BRPM | SYSTOLIC BLOOD PRESSURE: 112 MMHG | HEART RATE: 98 BPM | OXYGEN SATURATION: 100 % | TEMPERATURE: 97.8 F | DIASTOLIC BLOOD PRESSURE: 80 MMHG | HEIGHT: 61 IN | WEIGHT: 115 LBS

## 2020-04-04 PROCEDURE — 99282 EMERGENCY DEPT VISIT SF MDM: CPT

## 2020-04-04 RX ORDER — OXYCODONE HYDROCHLORIDE AND ACETAMINOPHEN 5; 325 MG/1; MG/1
1 TABLET ORAL EVERY 6 HOURS PRN
Qty: 12 TABLET | Refills: 0 | Status: SHIPPED | OUTPATIENT
Start: 2020-04-04 | End: 2020-04-07

## 2020-04-04 RX ORDER — BUPIVACAINE HYDROCHLORIDE 5 MG/ML
10 INJECTION, SOLUTION EPIDURAL; INTRACAUDAL ONCE
Status: DISCONTINUED | OUTPATIENT
Start: 2020-04-04 | End: 2020-04-05 | Stop reason: HOSPADM

## 2020-04-04 ASSESSMENT — PAIN DESCRIPTION - PROGRESSION: CLINICAL_PROGRESSION: NOT CHANGED

## 2020-04-04 ASSESSMENT — PAIN DESCRIPTION - ONSET: ONSET: ON-GOING

## 2020-04-04 ASSESSMENT — PAIN DESCRIPTION - ORIENTATION: ORIENTATION: RIGHT;LOWER

## 2020-04-04 ASSESSMENT — PAIN SCALES - GENERAL: PAINLEVEL_OUTOF10: 10

## 2020-04-04 ASSESSMENT — PAIN DESCRIPTION - LOCATION: LOCATION: TEETH

## 2020-04-04 ASSESSMENT — PAIN DESCRIPTION - FREQUENCY: FREQUENCY: CONTINUOUS

## 2020-04-04 ASSESSMENT — PAIN DESCRIPTION - PAIN TYPE: TYPE: ACUTE PAIN

## 2020-04-04 ASSESSMENT — PAIN DESCRIPTION - DESCRIPTORS: DESCRIPTORS: ACHING;THROBBING

## 2020-04-05 NOTE — ED NOTES
Patient given discharge instructions medications and follow up care reviewed.  Patient voiced understanding         Adriana Mallory RN  04/05/20 4137

## 2020-04-07 ENCOUNTER — HOSPITAL ENCOUNTER (EMERGENCY)
Age: 30
Discharge: HOME | End: 2020-04-07
Payer: COMMERCIAL

## 2020-04-07 VITALS
HEIGHT: 61 IN | RESPIRATION RATE: 16 BRPM | HEART RATE: 92 BPM | WEIGHT: 115 LBS | SYSTOLIC BLOOD PRESSURE: 128 MMHG | DIASTOLIC BLOOD PRESSURE: 82 MMHG | BODY MASS INDEX: 21.71 KG/M2 | OXYGEN SATURATION: 98 % | TEMPERATURE: 97.8 F

## 2020-04-07 PROCEDURE — 99282 EMERGENCY DEPT VISIT SF MDM: CPT

## 2020-04-07 RX ORDER — BUPIVACAINE HYDROCHLORIDE 5 MG/ML
10 INJECTION, SOLUTION EPIDURAL; INTRACAUDAL ONCE
Status: DISCONTINUED | OUTPATIENT
Start: 2020-04-07 | End: 2020-04-07 | Stop reason: HOSPADM

## 2020-04-07 RX ORDER — ERYTHROMYCIN 5 MG/G
OINTMENT OPHTHALMIC
Qty: 1 TUBE | Refills: 1 | Status: SHIPPED | OUTPATIENT
Start: 2020-04-07 | End: 2020-04-17

## 2020-04-07 RX ORDER — OXYCODONE HYDROCHLORIDE AND ACETAMINOPHEN 5; 325 MG/1; MG/1
1 TABLET ORAL EVERY 6 HOURS PRN
Qty: 10 TABLET | Refills: 0 | Status: SHIPPED | OUTPATIENT
Start: 2020-04-07 | End: 2020-04-10

## 2020-04-07 RX ORDER — LIDOCAINE HYDROCHLORIDE 20 MG/ML
5 INJECTION, SOLUTION INFILTRATION; PERINEURAL ONCE
Status: DISCONTINUED | OUTPATIENT
Start: 2020-04-07 | End: 2020-04-07 | Stop reason: HOSPADM

## 2020-04-07 RX ORDER — ERYTHROMYCIN 5 MG/G
OINTMENT OPHTHALMIC ONCE
Status: DISCONTINUED | OUTPATIENT
Start: 2020-04-07 | End: 2020-04-07 | Stop reason: HOSPADM

## 2020-04-07 ASSESSMENT — PAIN SCALES - GENERAL
PAINLEVEL_OUTOF10: 8
PAINLEVEL_OUTOF10: 8

## 2020-04-07 ASSESSMENT — PAIN DESCRIPTION - LOCATION
LOCATION: TEETH
LOCATION: TEETH

## 2020-04-07 ASSESSMENT — PAIN DESCRIPTION - FREQUENCY
FREQUENCY: CONTINUOUS
FREQUENCY: CONTINUOUS

## 2020-04-07 ASSESSMENT — PAIN DESCRIPTION - PAIN TYPE
TYPE: ACUTE PAIN
TYPE: ACUTE PAIN

## 2020-04-07 NOTE — ED PROVIDER NOTES
eMERGENCY dEPARTMENT eNCOUnter      PCP: No primary care provider on file. CHIEF COMPLAINT    Chief Complaint   Patient presents with    Dental Pain     Pt was not seen by physician    HPI    Matt Reddy is a 34 y.o. female who presents with continued right lower dental pain. Patient seen in the ED several times for this and is finishing a course of amoxicillin that was started on the 28th. She was here couple days ago and had a dental block performed and received a prescription for pain medicine. She is post to see Dr. John Prado but her appointment got canceled for today and rescheduled for Friday. She returns to the ED requesting another dental block and a small more amount of pain medicine until she can follow-up with her oral surgeon. Denies fever, facial redness or swelling. In addition patient reports that she woke up with her left eye matted closed this morning and some clear watery debris. Denies any vision changes. Denies any contact use. Denies foreign body sensation. REVIEW OF SYSTEMS    General: Denies Fever, Denies Chills, Denies syncope  ENT:  No tongue or lip swelling, No difficulty swallowing,   Respiratory: Denies difficulty breathing, wheezes. GI: Denies nausea, vomiting. Lymphatics:  No swollen nodes, red streaks  Skin:  See hpi. No rash.     All other review of systems are negative  See HPI and nursing notes for additional information     PAST MEDICAL & SURGICAL HISTORY    Past Medical History:   Diagnosis Date    Anxiety     Anxiety attack     Asthma     No Pulmonologist At This Time    B[facility] disorder ([facility]     Bronchitis Last Episode In 2007    Chronic back pain     Depression     Endometriosis     Genital herpes complicating pregnancy in antepartum condition 1/6/2015    High risk pregnancy due to maternal drug abuse ([facility] 1/6/2015    HSV-2 (herpes simplex virus 2) infection Last Episode 4-16    IBS (irritable bowel syndrome)     Panic attacks     Pelvic pain in female     Pneumonia Last Episode In [address] PTSD (post-traumatic stress disorder)     \"They Dx. Me With PTSD After My Mom Passed Away When I Was 16\"    Restless leg     Supervision of other high-risk pregnancy(V23.89) 2015    Teeth missing     Upper And Lower    UTI (urinary tract infection) In Past    No Current Symptoms    Wears partial dentures     Upper     Past Surgical History:   Procedure Laterality Date    ABDOMINAL EXPLORATION SURGERY       SECTION  1-6-15     SECTION      DENTAL SURGERY      Teeth Extracted In Past    ENDOMETRIAL ABLATION  9-15, 2000 [place]     HYSTERECTOMY  2016    Total laparoscopic hysterectomy, BSO, cysto    HYSTERECTOMY, TOTAL ABDOMINAL         CURRENT MEDICATIONS    Current Outpatient Rx   Medication Sig Dispense Refill    erythromycin (ROMYCIN) 5 MG/GM ophthalmic ointment Apply half centimeter ribbon to the inner lower eyelid of the affected eye 4 times a day for a week 1 Tube 1    oxyCODONE-acetaminophen (PERCOCET) 5-325 MG per tablet Take 1 tablet by mouth every 6 hours as needed for Pain for up to 3 days. Intended supply: 3 days. Take lowest dose possible to manage pain 10 tablet 0    naproxen (NAPROSYN) 500 MG tablet Take 1 tablet by mouth 2 times daily as needed for Pain 20 tablet 0    ibuprofen (ADVIL;MOTRIN) 600 MG tablet Take 1 tablet by mouth every 6 hours as needed for Pain 30 tablet 1    acetaminophen (TYLENOL) 500 MG tablet Take 1 tablet by mouth every 6 hours as needed for Pain 30 tablet 1    Magic Mouthwash (MIRACLE MOUTHWASH) Swish and spit 5 mLs 4 times daily as needed for Dental Pain --- Formulation: Equal parts mixture Benadryl, Maalox, viscous lidocaine 240 mL 0    amoxicillin (AMOXIL) 500 MG capsule Take 1 capsule by mouth 3 times daily for 10 days 30 capsule 0    methocarbamol (ROBAXIN) 500 MG tablet Take 1 tablet by mouth 4 times daily As needed for muscle spasm.  20 tablet 0    ondansetron

## 2020-04-12 ENCOUNTER — HOSPITAL ENCOUNTER (EMERGENCY)
Age: 30
Discharge: HOME | End: 2020-04-12
Attending: EMERGENCY MEDICINE
Payer: COMMERCIAL

## 2020-04-12 VITALS
SYSTOLIC BLOOD PRESSURE: 119 MMHG | WEIGHT: 115 LBS | HEART RATE: 90 BPM | HEIGHT: 55 IN | TEMPERATURE: 98 F | OXYGEN SATURATION: 99 % | BODY MASS INDEX: 26.61 KG/M2 | DIASTOLIC BLOOD PRESSURE: 81 MMHG | RESPIRATION RATE: 17 BRPM

## 2020-04-12 PROCEDURE — 99282 EMERGENCY DEPT VISIT SF MDM: CPT

## 2020-04-12 PROCEDURE — 6370000000 HC RX 637 (ALT 250 FOR IP): Performed by: PHYSICIAN ASSISTANT

## 2020-04-12 RX ORDER — TETRACAINE HYDROCHLORIDE 5 MG/ML
2 SOLUTION OPHTHALMIC ONCE
Status: COMPLETED | OUTPATIENT
Start: 2020-04-12 | End: 2020-04-12

## 2020-04-12 RX ORDER — CLINDAMYCIN HYDROCHLORIDE 150 MG/1
300 CAPSULE ORAL 4 TIMES DAILY
Qty: 56 CAPSULE | Refills: 0 | Status: SHIPPED | OUTPATIENT
Start: 2020-04-12 | End: 2020-04-19

## 2020-04-12 RX ORDER — OXYCODONE HYDROCHLORIDE AND ACETAMINOPHEN 5; 325 MG/1; MG/1
1 TABLET ORAL EVERY 6 HOURS PRN
Qty: 4 TABLET | Refills: 0 | Status: SHIPPED | OUTPATIENT
Start: 2020-04-12 | End: 2020-04-13

## 2020-04-12 RX ADMIN — TETRACAINE HYDROCHLORIDE 2 DROP: 5 SOLUTION OPHTHALMIC at 13:00

## 2020-04-12 RX ADMIN — FLUORESCEIN SODIUM 1 MG: 1 STRIP OPHTHALMIC at 13:15

## 2020-04-12 ASSESSMENT — PAIN DESCRIPTION - PAIN TYPE: TYPE: ACUTE PAIN

## 2020-04-12 ASSESSMENT — PAIN SCALES - GENERAL: PAINLEVEL_OUTOF10: 10

## 2020-04-12 ASSESSMENT — PAIN DESCRIPTION - DESCRIPTORS: DESCRIPTORS: ACHING

## 2020-04-12 ASSESSMENT — PAIN DESCRIPTION - LOCATION: LOCATION: EYE;MOUTH

## 2020-04-12 ASSESSMENT — PAIN DESCRIPTION - FREQUENCY: FREQUENCY: CONTINUOUS

## 2020-04-12 ASSESSMENT — PAIN DESCRIPTION - ORIENTATION: ORIENTATION: RIGHT;LOWER

## 2020-04-12 NOTE — ED PROVIDER NOTES
significant central photophobia. Bilateral sclera and palpebral conjunctival is significantly erythematous/injected bilaterally at the upper and lower eyelids. Clear watery discharge from both eyes. No matting of the eyelashes. EOMI but pain with lateral left eye movements. No nystagmus. Funduscopic exam reveals red reflex intact with no obvious retinal abnormalities. No foreign bodies or obvious corneal deficit. No hyphema seen on tangential light    ED course: Patient seen and examined. Patient is discussed with ophthalmology, Dr. Aye Daniels, who recommends changing patient's ophthalmic solution, starting patient on oral antibiotic, and he will see patient tomorrow in the office at noon. Patient does agree to keep this appointment. Patient is offered CT to evaluate for possible orbital cellulitis, she refuses this. He understands the risks of not having this CAT scan. Patient to follow-up with outpatient ophthalmology tomorrow. All diagnostic, treatment, and disposition decisions were made by myself in conjunction with the advanced practice provider. For all further details of the patient's emergency department visit, please see the advanced practice provider's documentation. Comment: Please note this report has been produced using speech recognition software and may contain errors related to that system including errors in grammar, punctuation, and spelling, as well as words and phrases that may be inappropriate. If there are any questions or concerns please feel free to contact the dictating provider for clarification.        [id number] [facility],   04/12/20 0505

## 2020-04-12 NOTE — ED PROVIDER NOTES
hours as needed for Pain for up to 1 day. 4 tablet 0    erythromycin (ROMYCIN) 5 MG/GM ophthalmic ointment Apply half centimeter ribbon to the inner lower eyelid of the affected eye 4 times a day for a week 1 Tube 1    naproxen (NAPROSYN) 500 MG tablet Take 1 tablet by mouth 2 times daily as needed for Pain 20 tablet 0    ibuprofen (ADVIL;MOTRIN) 600 MG tablet Take 1 tablet by mouth every 6 hours as needed for Pain 30 tablet 1    acetaminophen (TYLENOL) 500 MG tablet Take 1 tablet by mouth every 6 hours as needed for Pain 30 tablet 1    Magic Mouthwash (MIRACLE MOUTHWASH) Swish and spit 5 mLs 4 times daily as needed for Dental Pain --- Formulation: Equal parts mixture Benadryl, Maalox, viscous lidocaine 240 mL 0    methocarbamol (ROBAXIN) 500 MG tablet Take 1 tablet by mouth 4 times daily As needed for muscle spasm. 20 tablet 0    ondansetron (ZOFRAN ODT) 4 MG disintegrating tablet Take 1 tablet by mouth every 6 hours 10 tablet 0    albuterol sulfate HFA (PROVENTIL HFA) 108 (90 Base) MCG/ACT inhaler Inhale 2 puffs into the lungs every 4 hours as needed for Wheezing or Shortness of Breath With spacer (and mask if indicated). Thanks.  1 Inhaler 1    dicyclomine (BENTYL) 10 MG capsule Take 1 capsule by mouth 3 times daily As needed for abdominal pain 15 capsule 3    estradiol (ESTRACE) 1 MG tablet   11       ALLERGIES    Allergies   Allergen Reactions    Latex Rash    Bactrim [Sulfamethoxazole-Trimethoprim] Nausea And Vomiting    Ciprofloxacin Nausea And Vomiting    Codeine Itching and Nausea And Vomiting    Toradol [Ketorolac Tromethamine] Itching, Nausea And Vomiting and Rash     \"Migraines\"    Tramadol Itching, Nausea And Vomiting and Rash     \"Migraines\"    Vicodin [Hydrocodone-Acetaminophen] Itching and Nausea And Vomiting     \"Anxiety Gets Really Bad\"    Vistaril [Hydroxyzine Hcl] Itching     \"I Pass Out\"    Bactrim [Sulfamethoxazole-Trimethoprim]     Ciprofloxacin     Codeine     Toradol /81   Pulse 90   Temp 97.9 °F (36.6 °C) (Oral)   Resp 17   Ht (!) 1.5\" (0.038 m)   Wt 115 lb (52.2 kg)   LMP 05/19/2016   SpO2 99%   BMI 53627.04 kg/m²   Constitutional:  Well developed, well nourished, in mild acute pain/distress,     Eyes: Mild edema of the left upper and lower eyelid compared to the right. No palpable masses but there is tenderness of the eyelids and left periorbital region including left nasolabial fold without warmth induration or crepitus. No palpable crepitus or step-offs of the underlying facial bones or bony orbital ridge. Pupils equally round and reactive to light with significant left-sided direct photophobia, no significant central photophobia. Bilateral sclera and palpebral conjunctival is significantly erythematous/injected bilaterally at the upper and lower eyelids. Clear watery discharge from both eyes. No matting of the eyelashes. EOMI but pain with lateral left eye movements. No nystagmus. Funduscopic exam reveals red reflex intact with no obvious retinal abnormalities. No foreign bodies or obvious corneal deficit. No hyphema seen on tangential light    HENT:  Atraumatic,Normocephalic.  external ears normal, nose normal, oropharynx moist, no pharyngeal exudates. Overall, fair dentition in the upper and lower mouth. In the right lower mouth, premolar tooth has been extracted. There is intact partial clot in place with surrounding minimal gingival edema. There is tenderness to this area without fluctuance or crepitus. No palpable masses or fluctuance of the lower buccal mucosa interface. No bloody or purulent drainage from extraction site. No other intraoral lesions. No sublingual edema. No other facial tenderness or swelling of the right lower jaw or submandibular region.    Neck/Lymphatics: supple, no JVD, no swollen nodes   Respiratory:  No retractions  Cardiovascular:  No JVD  Integument:  Warm dry skin, no obvious rash, no evidence of Braden

## 2020-04-12 NOTE — LETTER
[address] [facility] Emergency Department  [address] [facility] [address]  Phone: [phone]  Fax: [phone]               April 12, 2020    Patient: Moira Buck   YOB: 1990   Date of Visit: 4/12/2020       To Whom It May Concern:    Mohit Hauser was seen and treated in our emergency department on 4/12/2020. She needs to be excused from work 4/12/20 thru 4/15/20.       Sincerely,       Mark Pinto RN         Signature:__________________________________

## 2020-04-30 NOTE — ED PROVIDER NOTES
Ciprofloxacin     Codeine     Toradol [Ketorolac Tromethamine]     Tramadol     Vicodin [Hydrocodone-Acetaminophen]     Vistaril [Hydroxyzine Hcl]        Nursing Notes Reviewed    Physical Exam:  ED Triage Vitals   Enc Vitals Group      BP 04/04/20 2317 112/80      Pulse 04/04/20 2317 98      Resp 04/04/20 2317 18      Temp 04/04/20 2317 97.8 °F (36.6 °C)      Temp Source 04/04/20 2317 Oral      SpO2 04/04/20 2317 100 %      Weight 04/04/20 2315 115 lb (52.2 kg)      Height 04/04/20 2315 5' 1\" (1.549 m)      Head Circumference --       Peak Flow --       Pain Score --       Pain Loc --       Pain Edu? --       Excl. in [address]? --        General appearance:  No acute distress. Skin:  Warm. Dry. Eye:  Extraocular movements grossly intact. Ears, nose, mouth and throat:  Oral mucosa moist, no edema under the tongue; posterior pharynx without erythema, exudate or edema; no pointing dental abscess. Dental caries noted. Point tenderness over right lower teeth. Neck:  Trachea midline. No stridor. No edema or asymmetry of the neck. No tender palpable cervical lymphadenopathy  Heart: Regular rate and rhythm. Respiratory:  Respirations nonlabored. Lungs are clear to auscultation. Neurological:  Alert and oriented. Gait is steady. MDM:  Patient presenting for dental pain which is most likely secondary to tooth decay/dental caries. Cannot exclude early periapical abscess. Clinically there is no evidence of drainable abscess or Javier's angina. No airway compromise or evidence of sepsis. Patient was offered a dental block in order to control her symptoms. She is agreeable to this. Approximately 1 mL of 0.5% bupivacaine is injected adjacent to the painful tooth. Patient tolerated this well and reports significant improvement in her discomfort. She is encouraged to continue antibiotics and naproxen as previously prescribed. Given a few tablets of percocet to use for breakthrough discomfort. Given a lengthy list of dental referrals and encouraged to begin calling offices in the morning to arrange follow up as soon as possible. I discussed the need for dental followup as emergency department treatment is not the definitive treatment of choice. Patient understands the plan. Return warnings given. Clinical Impression:  1. [facility]        DISPOSITION Discharge - Pending Orders Complete 04/04/2020 11:37:17 PM      Disposition referral (if applicable):    Please follow up with a dentist as soon as possible        Disposition medications (if applicable):  Discharge Medication List as of 4/5/2020 12:36 AM      START taking these medications    Details   oxyCODONE-acetaminophen (PERCOCET) 5-325 MG per tablet Take 1 tablet by mouth every 6 hours as needed for Pain for up to 3 days. , Disp-12 tablet, [id number]             Comment: Please note this report has been produced using speech recognition software and may contain errors related to that system including errors in grammar, punctuation, and spelling, as well as words and phrases that may be inappropriate. If there are any questions or concerns please feel free to contact the dictating provider for clarification.        Svitlana Barnard,   04/29/20 2056

## 2020-05-17 ENCOUNTER — HOSPITAL ENCOUNTER (EMERGENCY)
Age: 30
Discharge: HOME | End: 2020-05-17
Payer: COMMERCIAL

## 2020-05-17 VITALS
HEIGHT: 62 IN | SYSTOLIC BLOOD PRESSURE: 116 MMHG | HEART RATE: 97 BPM | RESPIRATION RATE: 16 BRPM | DIASTOLIC BLOOD PRESSURE: 75 MMHG | WEIGHT: 112 LBS | BODY MASS INDEX: 20.61 KG/M2 | OXYGEN SATURATION: 96 % | TEMPERATURE: 98.4 F

## 2020-05-17 PROCEDURE — 99282 EMERGENCY DEPT VISIT SF MDM: CPT

## 2020-05-17 RX ORDER — AMOXICILLIN 500 MG/1
500 CAPSULE ORAL 3 TIMES DAILY
Qty: 21 CAPSULE | Refills: 0 | Status: SHIPPED | OUTPATIENT
Start: 2020-05-17 | End: 2020-05-24

## 2020-05-17 RX ORDER — IBUPROFEN 600 MG/1
600 TABLET ORAL EVERY 6 HOURS PRN
Qty: 20 TABLET | Refills: 0 | Status: SHIPPED | OUTPATIENT
Start: 2020-05-17 | End: 2020-10-09 | Stop reason: ALTCHOICE

## 2020-05-17 ASSESSMENT — PAIN DESCRIPTION - LOCATION
LOCATION_2: HEAD
LOCATION: MOUTH

## 2020-05-17 ASSESSMENT — PAIN DESCRIPTION - ORIENTATION: ORIENTATION: RIGHT;LEFT

## 2020-05-17 ASSESSMENT — PAIN DESCRIPTION - PAIN TYPE: TYPE: ACUTE PAIN

## 2020-05-17 ASSESSMENT — PAIN DESCRIPTION - FREQUENCY: FREQUENCY: CONTINUOUS

## 2020-05-17 ASSESSMENT — PAIN DESCRIPTION - DESCRIPTORS
DESCRIPTORS_2: ACHING
DESCRIPTORS: ACHING

## 2020-05-17 ASSESSMENT — PAIN SCALES - GENERAL: PAINLEVEL_OUTOF10: 8

## 2020-05-17 ASSESSMENT — PAIN DESCRIPTION - INTENSITY: RATING_2: 7

## 2020-05-17 NOTE — ED PROVIDER NOTES
[de-identified] Away When I Was 16\"    Restless leg     Supervision of other high-risk pregnancy(V23.89) 2015    Teeth missing     Upper And Lower    UTI (urinary tract infection) In Past    No Current Symptoms    Wears partial dentures     Upper     Past Surgical History:   Procedure Laterality Date    ABDOMINAL EXPLORATION SURGERY       SECTION  -6-15     SECTION      DENTAL SURGERY      Teeth Extracted In Past    ENDOMETRIAL ABLATION  9-15,  Or     HYSTERECTOMY  2016    Total laparoscopic hysterectomy, BSO, cysto    HYSTERECTOMY, TOTAL ABDOMINAL         CURRENT MEDICATIONS    Current Outpatient Rx   Medication Sig Dispense Refill    amoxicillin (AMOXIL) 500 MG capsule Take 1 capsule by mouth 3 times daily for 7 days 21 capsule 0    ibuprofen (ADVIL;MOTRIN) 600 MG tablet Take 1 tablet by mouth every 6 hours as needed for Pain 20 tablet 0    Magic Mouthwash (MIRACLE MOUTHWASH) Swish and spit 5 mLs 4 times daily as needed for Dental Pain Equal parts mixture benadryl, maalox, and lidocaine. 240 mL 0    neomycin-polymyxin-dexamethasone (MAXITROL) 0.1 % ophthalmic suspension Apply one drop to each eye every 2-3 hours while awake 2 mL 0    naproxen (NAPROSYN) 500 MG tablet Take 1 tablet by mouth 2 times daily as needed for Pain 20 tablet 0    acetaminophen (TYLENOL) 500 MG tablet Take 1 tablet by mouth every 6 hours as needed for Pain 30 tablet 1    methocarbamol (ROBAXIN) 500 MG tablet Take 1 tablet by mouth 4 times daily As needed for muscle spasm. 20 tablet 0    ondansetron (ZOFRAN ODT) 4 MG disintegrating tablet Take 1 tablet by mouth every 6 hours 10 tablet 0    albuterol sulfate HFA (PROVENTIL HFA) 108 (90 Base) MCG/ACT inhaler Inhale 2 puffs into the lungs every 4 hours as needed for Wheezing or Shortness of Breath With spacer (and mask if indicated). Thanks.  1 Inhaler 1    dicyclomine (BENTYL) 10 MG capsule Take 1 capsule by mouth 3 times daily As needed for abdominal pain 15 capsule 3    estradiol (ESTRACE) 1 MG tablet   11       ALLERGIES    Allergies   Allergen Reactions    Latex Rash    Bactrim [Sulfamethoxazole-Trimethoprim] Nausea And Vomiting    Ciprofloxacin Nausea And Vomiting    Codeine Itching and Nausea And Vomiting    Toradol [Ketorolac Tromethamine] Itching, Nausea And Vomiting and Rash     \"Migraines\"    Tramadol Itching, Nausea And Vomiting and Rash     \"Migraines\"    Vicodin [Hydrocodone-Acetaminophen] Itching and Nausea And Vomiting     \"Anxiety Gets Really Bad\"    Vistaril [Hydroxyzine Hcl] Itching     \"I Pass Out\"    Bactrim [Sulfamethoxazole-Trimethoprim]     Ciprofloxacin     Codeine     Toradol [Ketorolac Tromethamine]     Tramadol     Vicodin [Hydrocodone-Acetaminophen]     Vistaril [Hydroxyzine Hcl]        SOCIAL & FAMILY HISTORY    Social History     Socioeconomic History    Marital status: Single     Spouse name: None    Number of children: None    Years of education: None    Highest education level: None   Occupational History    None   Social Needs    Financial resource strain: None    Food insecurity     Worry: None     Inability: None    Transportation needs     Medical: None     Non-medical: None   Tobacco Use    Smoking status: Current Every Day Smoker     Packs/day: 0.50     Years: 11.00     Pack years: 5.50     Types: Cigarettes    Smokeless tobacco: Never Used   Substance and Sexual Activity    Alcohol use: Not Currently     Comment: occ    Drug use: Yes     Types: Marijuana     Comment: \"Smoke Marijuana Maybe Once A Month\"    Sexual activity: Not Currently   Lifestyle    Physical activity     Days per week: None     Minutes per session: None    Stress: None   Relationships    Social connections     Talks on phone: None     Gets together: None     Attends Religion service: None     Active member of club or organization: None     Attends meetings of clubs or organizations: None     Relationship status: prescription for Motrin and Magic mouthwash. I recommend follow-up with dentist as soon as possible for evaluation. Patient becomes angry and leaves prior to receiving paperwork/prescriptions. Clinical  IMPRESSION    Acute Dental pain      Return to emergency Department warning signs discussed in detail with patient who understands and agrees, including but not limited to difficulty swallowing, increased jaw swelling, fever, increased pain, or any new symptoms. Comment: Please note this report has been produced using speech recognition software and may contain errors related to that system including errors in grammar, punctuation, and spelling, as well as words and phrases that may be inappropriate. If there are any questions or concerns please feel free to contact the dictating provider for clarification.           Carolyn Montenegro PA-C  05/17/20 [id number]

## 2020-05-18 ENCOUNTER — CARE COORDINATION (OUTPATIENT)
Dept: CARE COORDINATION | Age: 30
End: 2020-05-18

## 2020-05-18 NOTE — CARE COORDINATION
Outreach attempt regarding pt recent visit to the ED.  Pt was unable to reach and writer unable to leave a VM message due to \"user busy\" signal.

## 2020-05-20 NOTE — CARE COORDINATION
Outreach attempt regarding pt recent visit to the ED.  Pt was unable to reach due to phone with busy signal.

## 2020-07-05 ENCOUNTER — APPOINTMENT (OUTPATIENT)
Dept: GENERAL RADIOLOGY | Age: 30
End: 2020-07-05
Payer: COMMERCIAL

## 2020-07-05 ENCOUNTER — HOSPITAL ENCOUNTER (EMERGENCY)
Age: 30
Discharge: HOME | End: 2020-07-05
Attending: EMERGENCY MEDICINE
Payer: COMMERCIAL

## 2020-07-05 VITALS
HEART RATE: 71 BPM | DIASTOLIC BLOOD PRESSURE: 77 MMHG | SYSTOLIC BLOOD PRESSURE: 103 MMHG | TEMPERATURE: 98.2 F | HEIGHT: 61 IN | WEIGHT: 114 LBS | RESPIRATION RATE: 16 BRPM | OXYGEN SATURATION: 99 % | BODY MASS INDEX: 21.52 KG/M2

## 2020-07-05 PROCEDURE — 99283 EMERGENCY DEPT VISIT LOW MDM: CPT

## 2020-07-05 PROCEDURE — 71045 X-RAY EXAM CHEST 1 VIEW: CPT

## 2020-07-05 PROCEDURE — 70360 X-RAY EXAM OF NECK: CPT

## 2020-07-05 RX ORDER — MAGNESIUM HYDROXIDE/ALUMINUM HYDROXICE/SIMETHICONE 120; 1200; 1200 MG/30ML; MG/30ML; MG/30ML
5 SUSPENSION ORAL EVERY 6 HOURS PRN
Qty: 1 BOTTLE | Refills: 0 | Status: SHIPPED | OUTPATIENT
Start: 2020-07-05 | End: 2020-10-09 | Stop reason: ALTCHOICE

## 2020-07-05 RX ORDER — OMEPRAZOLE 20 MG/1
20 CAPSULE, DELAYED RELEASE ORAL
Qty: 180 CAPSULE | Refills: 1 | Status: SHIPPED | OUTPATIENT
Start: 2020-07-05 | End: 2020-10-09 | Stop reason: ALTCHOICE

## 2020-07-05 ASSESSMENT — PAIN SCALES - GENERAL: PAINLEVEL_OUTOF10: 8

## 2020-07-05 ASSESSMENT — PAIN DESCRIPTION - DESCRIPTORS: DESCRIPTORS: CONSTANT

## 2020-07-05 ASSESSMENT — PAIN DESCRIPTION - LOCATION: LOCATION: NECK;SHOULDER;CHEST

## 2020-07-05 ASSESSMENT — PAIN DESCRIPTION - PAIN TYPE: TYPE: ACUTE PAIN

## 2020-07-05 ASSESSMENT — PAIN DESCRIPTION - ORIENTATION: ORIENTATION: RIGHT

## 2020-07-05 NOTE — ED TRIAGE NOTES
EXAMINATION:   TWO XRAY VIEWS OF THE NECK SOFT TISSUES       7/5/2020 9:18 am       COMPARISON:   January 18, 2012.       HISTORY:   ORDERING SYSTEM PROVIDED HISTORY: vomited a few days ago, now globus   sensation, rule out free air in tissues   TECHNOLOGIST PROVIDED HISTORY:   Reason for exam:->vomited a few days ago, now globus sensation, rule out free   air in tissues       FINDINGS:   No radiographic evidence of a soft tissue gas within the visualized portion   of the neck.       No evidence of prevertebral soft tissue swelling.       No evidence of acute osseous abnormalities.           Impression   1. No radiographic evidence of soft tissue gas within the visualized portion   of the neck.       2. No evidence of prevertebral soft tissue swelling.       RECOMMENDATION:   If there is a persistent concern for acute pathology, CT neck with contrast   would provide additional information.

## 2020-07-05 NOTE — ED PROVIDER NOTES
eMERGENCY dEPARTMENT eNCOUnter      PCP: No primary care provider on file. CHIEF COMPLAINT    Chief Complaint   Patient presents with    Cyst     Patient reports a knot to the right side of her neck, causing radiating pain to her right shoulder and chest       HPI    Jeff Butterfield is a 27 y.o. female who presents with pain with swallowing, globus sensation. She states that a few days ago she smoked too many cigarettes which caused her to vomit. She states that since that time she has a sensation like there is something in her throat, irritation of throat and pain with swallowing. She states she feels like there is something blocking but she has been able to eat and drink without any difficulty. She denies abdominal pain. Does state that she has feeling of reflux after she eats, like the food is coming up. No further vomiting since that time. REVIEW OF SYSTEMS    Constitutional:  Denies fever, chills.    HENT:  Denies sore throat or ear pain   Cardiovascular:  Denies chest pain, palpitations   Respiratory:  Denies cough or shortness of breath    GI:  Denies abdominal pain, nausea, + vomiting  :  Denies any urinary symptoms, flank pain  Musculoskeletal:  Denies back pain, extremity pain  Skin:  Denies rash, color change  Neurologic:  Denies headache, focal weakness or sensory changes   Lymphatic:  Denies swollen glands, edema    All other review of systems are negative  See HPI and nursing notes for additional information     PAST MEDICAL AND SURGICAL HISTORY    Past Medical History:   Diagnosis Date    Anxiety     Anxiety attack     Asthma     No Pulmonologist At This Time    Bipolar disorder ([facility] [address]     Bronchitis Last Episode In 2007    Chronic back pain     Depression     Endometriosis     Genital herpes complicating pregnancy in antepartum condition 1/6/2015    High risk pregnancy due to maternal drug abuse ([facility] [address] 1/6/2015    HSV-2 (herpes simplex virus 2) infection Last Episode 4-16  IBS (irritable bowel syndrome)     Panic attacks     Pelvic pain in female     Pneumonia Last Episode In  Or     PTSD (post-traumatic stress disorder)     \"They Dx. Me With PTSD After My Mom Passed Away When I Was 16\"    Restless leg     Supervision of other high-risk pregnancy(V23.89) 2015    Teeth missing     Upper And Lower    UTI (urinary tract infection) In Past    No Current Symptoms    Wears partial dentures     Upper     Past Surgical History:   Procedure Laterality Date    ABDOMINAL EXPLORATION SURGERY       SECTION  1-6-15     SECTION      DENTAL SURGERY      Teeth Extracted In Past    ENDOMETRIAL ABLATION  9-15, 2000 Or     HYSTERECTOMY  2016    Total laparoscopic hysterectomy, BSO, cysto    HYSTERECTOMY, TOTAL ABDOMINAL         CURRENT MEDICATIONS    Current Outpatient Rx   Medication Sig Dispense Refill    ibuprofen (ADVIL;MOTRIN) 600 MG tablet Take 1 tablet by mouth every 6 hours as needed for Pain 20 tablet 0    Magic Mouthwash (MIRACLE MOUTHWASH) Swish and spit 5 mLs 4 times daily as needed for Dental Pain Equal parts mixture benadryl, maalox, and lidocaine. 240 mL 0    neomycin-polymyxin-dexamethasone (MAXITROL) 0.1 % ophthalmic suspension Apply one drop to each eye every 2-3 hours while awake 2 mL 0    naproxen (NAPROSYN) 500 MG tablet Take 1 tablet by mouth 2 times daily as needed for Pain 20 tablet 0    acetaminophen (TYLENOL) 500 MG tablet Take 1 tablet by mouth every 6 hours as needed for Pain 30 tablet 1    methocarbamol (ROBAXIN) 500 MG tablet Take 1 tablet by mouth 4 times daily As needed for muscle spasm. 20 tablet 0    ondansetron (ZOFRAN ODT) 4 MG disintegrating tablet Take 1 tablet by mouth every 6 hours 10 tablet 0    albuterol sulfate HFA (PROVENTIL HFA) 108 (90 Base) MCG/ACT inhaler Inhale 2 puffs into the lungs every 4 hours as needed for Wheezing or Shortness of Breath With spacer (and mask if indicated). Thanks.  1 phone: Not on file     Gets together: Not on file     Attends Evangelical service: Not on file     Active member of club or organization: Not on file     Attends meetings of clubs or organizations: Not on file     Relationship status: Not on file    Intimate partner violence     Fear of current or ex partner: Not on file     Emotionally abused: Not on file     Physically abused: Not on file     Forced sexual activity: Not on file   Other Topics Concern    Not on file   Social History Narrative    ** Merged History Encounter **          Family History   Problem Relation Age of Onset    Early Death Mother         45 Or 44    Arthritis Mother     High Blood Pressure Mother     Substance Abuse Father         \"Crackhead\"    Mental Illness Sister         \"Split Personality, Anxiety\"    High Blood Pressure Sister     Heart Disease Brother     Depression Sister     Mental Illness Sister         Anxiety, PTSD         PHYSICAL EXAM    VITAL SIGNS: /77   Pulse 71   Temp 98.2 °F (36.8 °C) (Oral)   Resp 16   Ht 5' 1\" (1.549 m)   Wt 114 lb (51.7 kg)   LMP 05/19/2016   SpO2 99%   BMI 21.54 kg/m²    Constitutional:  Well developed, No acute distress. HENT:  Normocephalic, Atraumatic, PERRL. EOMI. Sclera clear. Conjunctiva normal.  Normal posterior pharynx. Following without difficulty. Tolerating secretions. No trismus. Neck: supple without ridigity. No palpable mass, lymphadenopathy. Cardiovascular:  Regular rate and rhythm, No murmurs  Respiratory:  Nonlabored breathing. Normal breath sounds  Abdomen: Soft, Non tender, bowel sounds present. Musculoskeletal: No edema, No tenderness  Integument:  Warm, Dry, no rash  Neurologic:  Alert & oriented , No focal deficits noted. Speech normal.  Psychiatric:  Affect normal, Mood normal.         RADIOLOGY    Xr Neck Soft Tissue    Result Date: 7/5/2020  EXAMINATION: TWO XRAY VIEWS OF THE NECK SOFT TISSUES 7/5/2020 9:18 am COMPARISON: January 18, 2012. HISTORY: ORDERING SYSTEM PROVIDED HISTORY: vomited a few days ago, now globus sensation, rule out free air in tissues TECHNOLOGIST PROVIDED HISTORY: Reason for exam:->vomited a few days ago, now globus sensation, rule out free air in tissues FINDINGS: No radiographic evidence of a soft tissue gas within the visualized portion of the neck. No evidence of prevertebral soft tissue swelling. No evidence of acute osseous abnormalities. 1. No radiographic evidence of soft tissue gas within the visualized portion of the neck. 2. No evidence of prevertebral soft tissue swelling. RECOMMENDATION: If there is a persistent concern for acute pathology, CT neck with contrast would provide additional information. Xr Chest Portable    Result Date: 7/5/2020  EXAMINATION: ONE XRAY VIEW OF THE CHEST 7/5/2020 9:18 am COMPARISON: December 10, 2019. HISTORY: ORDERING SYSTEM PROVIDED HISTORY: vomited a few days ago, now globus sensation, rule out free air TECHNOLOGIST PROVIDED HISTORY: Reason for exam:->vomited a few days ago, now globus sensation, rule out free air Reason for Exam: 1 FINDINGS: Cardiac and mediastinal contours are unchanged. No focal consolidation. No significant pleural effusion. No evidence of pneumothorax. No radiographic evidence of pneumomediastinum. No evidence of acute osseous abnormalities. No radiographic evidence of acute cardiopulmonary process. ED COURSE & MEDICAL DECISION MAKING       Vital signs and nursing notes reviewed during ED course. All pertinent Lab data and radiographic results reviewed with patient at bedside. The patient and/or the family were informed of the results of any tests/labs/imaging, the treatment plan, and time was allotted to answer questions. This is a 72-year-old female who presented with concerns for globus sensation, pain with swallowing after vomiting a few days ago.   X-rays performed, which showed no evidence of soft tissue gas in the neck or chest.  She is well-appearing and nontoxic. She is not hypoxic. She has no difficulty with secretions or swallowing at this time. Posterior pharynx appears normal.  As she described sensation of acid reflux, will treat with acid reducing medication and assess if this helps the symptoms. Did instruct her to follow-up outpatient with a primary care physician, and if her symptoms continue despite the medications advised for scope procedure to be performed. Provide information for follow-up for this. He voices understanding of the discharge instructions and return precautions.       Clinical  IMPRESSION    Dysphagia           (Please note the MDM and HPI sections of this note were completed with a voice recognition program.  Efforts were made to edit the dictations but occasionally words are mis-transcribed.)      Marnie Ramsey DO  07/05/20 [id number]

## 2020-07-05 NOTE — LETTER
[facility] Emergency Department  [address] [place] [id number]  Phone: [phone]  Fax: [phone]               July 5, 2020    Patient: Pavel De Paz   YOB: 1990   Date of Visit: 7/5/2020       To Whom It May Concern:    Melissa Handler was seen and treated in our emergency department on 7/5/2020. She may return to work on 07/06/2020.       Sincerely,       Sabine Pablo RN         Signature:__________________________________

## 2020-07-05 NOTE — ED NOTES
EXAMINATION:   ONE XRAY VIEW OF THE CHEST       7/5/2020 9:18 am       COMPARISON:   December 10, 2019.       HISTORY:   ORDERING SYSTEM PROVIDED HISTORY: vomited a few days ago, now globus   sensation, rule out free air   TECHNOLOGIST PROVIDED HISTORY:   Reason for exam:->vomited a few days ago, now globus sensation, rule out free   air   Reason for Exam: 1       FINDINGS:   Cardiac and mediastinal contours are unchanged.       No focal consolidation.  No significant pleural effusion.  No evidence of   pneumothorax.  No radiographic evidence of pneumomediastinum.       No evidence of acute osseous abnormalities.           Impression   No radiographic evidence of acute cardiopulmonary process.           Arnulfo Abel RN  07/05/20 [id number]

## 2020-10-02 ENCOUNTER — APPOINTMENT (OUTPATIENT)
Dept: GENERAL RADIOLOGY | Age: 30
End: 2020-10-02
Payer: COMMERCIAL

## 2020-10-02 ENCOUNTER — HOSPITAL ENCOUNTER (EMERGENCY)
Age: 30
Discharge: HOME | End: 2020-10-02
Payer: COMMERCIAL

## 2020-10-02 VITALS
DIASTOLIC BLOOD PRESSURE: 83 MMHG | HEART RATE: 99 BPM | RESPIRATION RATE: 18 BRPM | HEIGHT: 62 IN | BODY MASS INDEX: 20.98 KG/M2 | SYSTOLIC BLOOD PRESSURE: 111 MMHG | OXYGEN SATURATION: 100 % | TEMPERATURE: 97.6 F | WEIGHT: 114 LBS

## 2020-10-02 PROCEDURE — 99283 EMERGENCY DEPT VISIT LOW MDM: CPT

## 2020-10-02 PROCEDURE — 6370000000 HC RX 637 (ALT 250 FOR IP): Performed by: PHYSICIAN ASSISTANT

## 2020-10-02 PROCEDURE — 73030 X-RAY EXAM OF SHOULDER: CPT

## 2020-10-02 PROCEDURE — 71101 X-RAY EXAM UNILAT RIBS/CHEST: CPT

## 2020-10-02 RX ORDER — LIDOCAINE 4 G/G
1 PATCH TOPICAL DAILY
Qty: 30 PATCH | Refills: 0 | Status: SHIPPED | OUTPATIENT
Start: 2020-10-02 | End: 2020-10-09 | Stop reason: ALTCHOICE

## 2020-10-02 RX ORDER — METHOCARBAMOL 500 MG/1
500 TABLET, FILM COATED ORAL 4 TIMES DAILY
Qty: 20 TABLET | Refills: 0 | Status: SHIPPED | OUTPATIENT
Start: 2020-10-02 | End: 2020-10-09 | Stop reason: ALTCHOICE

## 2020-10-02 RX ORDER — NAPROXEN 500 MG/1
500 TABLET ORAL 2 TIMES DAILY PRN
Qty: 30 TABLET | Refills: 0 | Status: SHIPPED | OUTPATIENT
Start: 2020-10-02 | End: 2020-10-09 | Stop reason: ALTCHOICE

## 2020-10-02 RX ORDER — OXYCODONE HYDROCHLORIDE AND ACETAMINOPHEN 5; 325 MG/1; MG/1
1 TABLET ORAL ONCE
Status: COMPLETED | OUTPATIENT
Start: 2020-10-02 | End: 2020-10-02

## 2020-10-02 RX ADMIN — OXYCODONE HYDROCHLORIDE AND ACETAMINOPHEN 1 TABLET: 5; 325 TABLET ORAL at 11:56

## 2020-10-02 ASSESSMENT — PAIN SCALES - GENERAL
PAINLEVEL_OUTOF10: 8
PAINLEVEL_OUTOF10: 8

## 2020-10-02 ASSESSMENT — PAIN DESCRIPTION - PAIN TYPE: TYPE: ACUTE PAIN

## 2020-10-02 NOTE — ED PROVIDER NOTES
eMERGENCY dEPARTMENT eNCOUnter      PCP: No primary care provider on file. CHIEF COMPLAINT    Chief Complaint   Patient presents with    Rib Pain     left, from under shoulder around to left rib cage pain. kicked by child while wrestling    Shoulder Pain       HPI    Leonardo Bergman is a 27 y.o. female who presents with left hip and shoulder pain. Onset was 3 days ago. Context is patient was roughhousing with a friend's child who attempted to pick her up while holding her arms against her sides. She states that she immediately had pain in her left upper arm and in her left chest wall with this and has been consistent since onset. She has been working and states that this exacerbates her symptoms. She denies any radiation of pain. No associated shortness of breath or wheezing. Pain worsens with movement of left upper extremity, palpation, deep inspiration. REVIEW OF SYSTEMS    Cardiac: + Chestwall pain. Denies any other Chest Pain except for pain stated above, No Syncope  Respiratory: +Pain on Inspiration over affected chestwall region, No SOB, wheezes, Hemoptysis  GI: No Vomiting, No Abdominal Pain  :  No hematuria  Musculoskeletal:  Denies back pain, No extremity pain or swelling. Neurologic: No Head Injury. No Neck Pain. No LOC. No lightheadedness, dizziness.   Skin:  Skin intact without discoloration    All other review of systems are negative  See HPI and nursing notes for additional information     PAST MEDICAL & SURGICAL HISTORY    Past Medical History:   Diagnosis Date    Anxiety     Anxiety attack     Asthma     No Pulmonologist At This Time    B[facility] disorder ([facility] [address]     Bronchitis Last Episode In 2007    Chronic back pain     Depression     Endometriosis     Genital herpes complicating pregnancy in antepartum condition 1/6/2015    High risk pregnancy due to maternal drug abuse ([facility] [address] 1/6/2015    HSV-2 (herpes simplex virus 2) infection Last Episode 4-16    IBS hours as needed for Pain 30 tablet 1    ondansetron (ZOFRAN ODT) 4 MG disintegrating tablet Take 1 tablet by mouth every 6 hours 10 tablet 0    albuterol sulfate HFA (PROVENTIL HFA) 108 (90 Base) MCG/ACT inhaler Inhale 2 puffs into the lungs every 4 hours as needed for Wheezing or Shortness of Breath With spacer (and mask if indicated). Thanks. 1 Inhaler 1    dicyclomine (BENTYL) 10 MG capsule Take 1 capsule by mouth 3 times daily As needed for abdominal pain 15 capsule 3    estradiol (ESTRACE) 1 MG tablet   11       ALLERGIES    Allergies   Allergen Reactions    Latex Rash    Bactrim [Sulfamethoxazole-Trimethoprim] Nausea And Vomiting    Ciprofloxacin Nausea And Vomiting    Codeine Itching and Nausea And Vomiting    Toradol [Ketorolac Tromethamine] Itching, Nausea And Vomiting and Rash     \"Migraines\"    Tramadol Itching, Nausea And Vomiting and Rash     \"Migraines\"    Vicodin [Hydrocodone-Acetaminophen] Itching and Nausea And Vomiting     \"Anxiety Gets Really Bad\"    Vistaril [Hydroxyzine Hcl] Itching     \"I Pass Out\"    Bactrim [Sulfamethoxazole-Trimethoprim]     Ciprofloxacin     Codeine     Toradol [Ketorolac Tromethamine]     Tramadol     Vicodin [Hydrocodone-Acetaminophen]     Vistaril [Hydroxyzine Hcl]        SOCIAL & FAMILY HISTORY    Social History     Socioeconomic History    Marital status: Single     Spouse name: None    Number of children: None    Years of education: None    Highest education level: None   Occupational History    None   Social Needs    Financial resource strain: None    Food insecurity     Worry: None     Inability: None    Transportation needs     Medical: None     Non-medical: None   Tobacco Use    Smoking status: Current Every Day Smoker     Packs/day: 0.50     Years: 11.00     Pack years: 5.50     Types: Cigarettes    Smokeless tobacco: Never Used   Substance and Sexual Activity    Alcohol use: Not Currently     Comment: occ    Drug use:  Yes Types: Marijuana     Comment: \"Smoke Marijuana Maybe Once A Month\"    Sexual activity: Not Currently   Lifestyle    Physical activity     Days per week: None     Minutes per session: None    Stress: None   Relationships    Social connections     Talks on phone: None     Gets together: None     Attends Zoroastrian service: None     Active member of club or organization: None     Attends meetings of clubs or organizations: None     Relationship status: None    Intimate partner violence     Fear of current or ex partner: None     Emotionally abused: None     Physically abused: None     Forced sexual activity: None   Other Topics Concern    None   Social History Narrative    ** Merged History Encounter **          Family History   Problem Relation Age of Onset    Early Death Mother         45 Or 44    Arthritis Mother     High Blood Pressure Mother     Substance Abuse Father         \"Crackhead\"    Mental Illness Sister         \"Split Personality, Anxiety\"    High Blood Pressure Sister     Heart Disease Brother     Depression Sister     Mental Illness Sister         Anxiety, PTSD       PHYSICAL EXAM    VITAL SIGNS: /83   Pulse 99   Temp 97.6 °F (36.4 °C) (Oral)   Resp 18   Ht 5' 1.5\" (1.562 m)   Wt 114 lb (51.7 kg)   LMP 05/19/2016   SpO2 100%   BMI 21.19 kg/m²    Constitutional:  Well developed, well nourished, no acute distress   HENT:  Atraumatic, moist mucus membranes  Neck: supple, no JVD   Chest wall:  No swelling or discoloration on inspection. No paradoxical movements. There is chest wall tenderness in the region of left sided upper midaxillary tenderness to palpation. No palpable defect. No crepitus. Lungs clear to auscultation bilateral lung fields. Respiratory:  Lungs are clear by auscultation, no retractions. Cardiovascular:  regular rate, no murmurs  GI:  Soft, no discoloration. No rigidity or guarding.   No splenic or liver tenderness, no other abdominal tenderness, normal bowel sounds  Musculoskeletal: Left upper extremity without bruising, swelling or erythema. There is discomfort palpation of the left upper arm. She has full range of motion of the shoulder which has lessened. .  Sensation intact pulse 2+. Soft compartments. No edema, no acute deformities  Vascular: Radial pulses 2+ equal bilaterally  Integument:  Skin warm and dry, no petechiae   Neurologic:  Alert & oriented, no slurred speech  Psych: Pleasant affect, no hallucinations    RADIOLOGY/PROCEDURES      XR RIBS LEFT INCLUDE CHEST (MIN 3 VIEWS)   Final Result   Negative chest/left rib series. XR SHOULDER LEFT (MIN 2 VIEWS)   Final Result   No acute abnormality. ED COURSE & MEDICAL DECISION MAKING       Vital signs and nursing notes reviewed during ED course. I have independently evaluated this patient. Supervising MD present in the Emergency Department, available for consultation, throughout entirety of  patient care. All pertinent Lab data and radiographic results reviewed with patient at bedside. Patient presents as above. History and exam is consistent with musculoskeletal etiology of symptoms. X-ray imaging without acute abnormality. We will plan on symptomatic treatment options. She is agreeable with this plan. Will discharge with naproxen, Robaxin and Lidoderm patch. Discussed following up with orthopedics if symptoms not gradually improving as we cannot exclude internal derangement of left shoulder, however lower suspicion for this at this time. Diagnosis, disposition, and plan discussed in detail with patient who understands and agrees. The patient and/or the family were informed of the results of any tests/labs/imaging, the treatment plan, and time was allotted to answer questions. Patient understands and agrees to follow up with PCP in the next 2-3 days for recheck.  Patient understands and agrees to return to the emergency department for any new or worsening symptoms including but not limited to change in nature of symptoms, worsening pain, swelling, difficulty breathing, shortness of breath, new/worsening cough. Clinical  IMPRESSION    1. Rib pain on left side    2. Left shoulder pain, unspecified chronicity          Comment: Please note this report has been produced using speech recognition software and may contain errors related to that system including errors in grammar, punctuation, and spelling, as well as words and phrases that may be inappropriate. If there are any questions or concerns please feel free to contact the dictating provider for clarification.           SUDHAKAR Elizalde  10/02/20 [id number]

## 2020-10-02 NOTE — ED NOTES
Pt presents to the ED for left sided rib pain that began three days ago.  Pt states her friends son squeezed her while playing and she felt a stabbing pain in her ribs by her armpit; no bruising noted     Iram Crane RN  10/02/20 [id number]

## 2020-10-04 ENCOUNTER — HOSPITAL ENCOUNTER (EMERGENCY)
Age: 30
Discharge: HOME | End: 2020-10-04
Payer: COMMERCIAL

## 2020-10-04 VITALS
DIASTOLIC BLOOD PRESSURE: 77 MMHG | HEIGHT: 61 IN | BODY MASS INDEX: 21.52 KG/M2 | RESPIRATION RATE: 16 BRPM | HEART RATE: 89 BPM | TEMPERATURE: 97.1 F | WEIGHT: 114 LBS | SYSTOLIC BLOOD PRESSURE: 109 MMHG | OXYGEN SATURATION: 100 %

## 2020-10-04 PROCEDURE — 99282 EMERGENCY DEPT VISIT SF MDM: CPT

## 2020-10-04 RX ORDER — VALACYCLOVIR HYDROCHLORIDE 1 G/1
1000 TABLET, FILM COATED ORAL DAILY
Qty: 5 TABLET | Refills: 0 | Status: SHIPPED | OUTPATIENT
Start: 2020-10-04 | End: 2020-10-09

## 2020-10-04 RX ORDER — OXYCODONE HYDROCHLORIDE AND ACETAMINOPHEN 5; 325 MG/1; MG/1
1 TABLET ORAL EVERY 8 HOURS PRN
Qty: 6 TABLET | Refills: 0 | Status: SHIPPED | OUTPATIENT
Start: 2020-10-04 | End: 2020-10-06

## 2020-10-04 ASSESSMENT — PAIN DESCRIPTION - PAIN TYPE: TYPE: ACUTE PAIN

## 2020-10-04 ASSESSMENT — PAIN DESCRIPTION - ORIENTATION: ORIENTATION: LEFT

## 2020-10-04 ASSESSMENT — PAIN SCALES - GENERAL: PAINLEVEL_OUTOF10: 8

## 2020-10-04 ASSESSMENT — PAIN DESCRIPTION - LOCATION: LOCATION: ARM

## 2020-10-04 NOTE — ED PROVIDER NOTES
eMERGENCY dEPARTMENT eNCOUnter         [address] [facility]    PCP: No primary care provider on file. CHIEF COMPLAINT    Chief Complaint   Patient presents with    Arm Pain     left arm pain - seen in ED few days ago for same sx. pt returns today for continued pain    Other     pt reports she is having an outbreak of HSV type 2       HPI    Syed Huizar is a 27 y.o. female who presents with concern for herpes genitalia outbreak with persistent left-sided shoulder and rib pain. Context is patient was seen 2 days ago for similar left arm pain. States symptoms initially began this past Wednesday after she was \"playing wrestling\" with a friend. States her arms were pinned down and she was \"squeezed really hard. She had onset of left-sided shoulder and rib pain. Was seen in the ED for these complaints 2 days ago, had negative imaging studies and was discharged home with anti-inflammatories, muscle relaxer as well as Lidoderm patch. She was also provided a sling. States that she is having continued pain in this area without new trauma or injury. Does state pain worsens after a long day of working although she is on limited lifting restrictions. Pain also worsens the left chest wall with palpation, torso movement and deep inspiration. No anticoagulation use. No hemoptysis. No difficulty swallowing. No new fever or chills. Patient also believes that the \"stress of my arm pain\" has brought on an HSV outbreak. Does have a history of herpes, states that she is on Valtrex 500 mg twice a day for maintenance but is been many months since her last outbreak. She began developing a vesicle to the left outer lower labial region which she states popped open while she took a shower this morning. Reporting tingling burning sensation in this area without other vaginal discharge, urinary retention, dysuria, other abdominal pain nausea or vomiting.   No concern for Vomiting    Ciprofloxacin Nausea And Vomiting    Codeine Itching and Nausea And Vomiting    Toradol [Ketorolac Tromethamine] Itching, Nausea And Vomiting and Rash     \"Migraines\"    Tramadol Itching, Nausea And Vomiting and Rash     \"Migraines\"    Vicodin [Hydrocodone-Acetaminophen] Itching and Nausea And Vomiting     \"Anxiety Gets Really Bad\"    Vistaril [Hydroxyzine Hcl] Itching     \"I Pass Out\"    Bactrim [Sulfamethoxazole-Trimethoprim]     Ciprofloxacin     Codeine     Toradol [Ketorolac Tromethamine]     Tramadol     Vicodin [Hydrocodone-Acetaminophen]     Vistaril [Hydroxyzine Hcl]        SOCIAL & FAMILY HISTORY    Social History     Socioeconomic History    Marital status: Single     Spouse name: None    Number of children: None    Years of education: None    Highest education level: None   Occupational History    None   Social Needs    Financial resource strain: None    Food insecurity     Worry: None     Inability: None    Transportation needs     Medical: None     Non-medical: None   Tobacco Use    Smoking status: Current Every Day Smoker     Packs/day: 0.50     Years: 11.00     Pack years: 5.50     Types: Cigarettes    Smokeless tobacco: Never Used   Substance and Sexual Activity    Alcohol use: Not Currently     Comment: occ    Drug use: Yes     Types: Marijuana     Comment: \"Smoke Marijuana Maybe Once A Month\"    Sexual activity: Not Currently   Lifestyle    Physical activity     Days per week: None     Minutes per session: None    Stress: None   Relationships    Social connections     Talks on phone: None     Gets together: None     Attends Mu-ism service: None     Active member of club or organization: None     Attends meetings of clubs or organizations: None     Relationship status: None    Intimate partner violence     Fear of current or ex partner: None     Emotionally abused: None     Physically abused: None     Forced sexual activity: None   Other Topics Concern abduction. Rotator cuff strength  5/5 against resistance.    -Provocative tests:  Negative Neer/ Ruiz, Negative Drop Arm. No swelling, discoloration, tenderness to palpation, or range of motion deficit of the ipsilateral elbow. : Outer vaginal exam with normal external genitalia however there is a small localized ulcerative lesion on erythematous base of the left outer labial region. Area is tender without crepitus or fluctuance. No bloody or purulent drainage. No swelling or tenderness of the Bartholin's gland. No other vesicular lesions, rash induration or fluctuance. Patient deferred speculum and bimanual exam  Vascular: Distal pulses intact   Integument:  Well hydrated, no rash   Neurologic:  Alert and oriented. Distal sensation intact. No functional deficits of elbows, wrists, hands, or fingers. 5/5  strength of the ipsilateral hand. Equal sensation over the bilateral deltoids and distally. No wrist drop. DTRs and distal sensation equal/intact. Psychiatric: Cooperative, pleasant affect        RADIOLOGY/PROCEDURES    NONE  ED COURSE & MEDICAL DECISION MAKING       Vital signs and nursing notes reviewed during ED course. I have independently evaluated this patient . Supervising MD - Dr. Jennifer Mijares - present in the Emergency Department, available for consultation, throughout entirety of  patient care. All pertinent Lab data and radiographic results reviewed with patient at bedside. The patient and/or the family were informed of the results of any tests/labs/imaging, the treatment plan, and time was allotted to answer questions. Differential diagnosis: includes but not limited to ligamentous injury, tendon injury, soft tissue contusion/hematoma, fracture, dislocation, Infection, Neurologic Deficit/Injury, Labral injury, Rotator cuff injury, fracture, dislocation. Clinical  IMPRESSION    1. Acute pain of left shoulder    2. Left-sided chest wall pain    3.  Herpes simplex vulvovaginitis          Patient presents with concern for herpes genitalia outbreak as well as persistent left-sided shoulder and chest wall pain after previous injury. On exam, well-appearing 27-year-old female, nontoxic, vitals are stable. 100% on room air. Nonlabored breathing. Trachea is midline. Reproducible tenderness over the anterolateral left chest wall and proximal left upper arm and shoulder to palpation palpable soft tissue or bony chest wall abnormalities. Clear equal air exchange. No inspiratory stridor. Compartments are soft throughout the left upper extremity and she is neurovascular intact distally. On outer  exam, there is a localized developing ulceration on erythematous base of the left outer labial region without evidence of developing abscess or cellulitis. Given patient's negative imaging studies 2 days ago, risk versus benefits of repeat imaging were discussed with patient. She is elected declined x-rays, states that she came in primarily for a work note to further limit her activities at work. Given the mechanism, I do suspect likely musculoskeletal injuries including contusion with component of chest wall strain. Educated on the importance of deep inspiration to limit risk of developing secondary pulmonary complication including atelectasis/pneumonia from splinted breathing. She continue previous medications provided at last ED visit including altering ice/locations the affected area. We will provide her a very short course of Percocet for her herpes genitalia outbreak as well as a brief crease burst of her Valtrex which she typically gets with acute outbreaks. She is encouraged to follow-up with OB/GYN and her PCP for recheck and for continued pain meds as needed. Educated on sits baths.  Low clinical suspicion for ACS/MI, dissection, PE, ischemic limb, compartment syndrome, intra-articular joint infection/septic arthritis, DVT, osteomyelitis, arterial/neurologic injury,

## 2020-10-09 ENCOUNTER — HOSPITAL ENCOUNTER (EMERGENCY)
Age: 30
Discharge: HOME | End: 2020-10-09
Attending: EMERGENCY MEDICINE
Payer: COMMERCIAL

## 2020-10-09 VITALS
HEART RATE: 109 BPM | OXYGEN SATURATION: 98 % | RESPIRATION RATE: 18 BRPM | DIASTOLIC BLOOD PRESSURE: 65 MMHG | TEMPERATURE: 99.1 F | HEIGHT: 61 IN | SYSTOLIC BLOOD PRESSURE: 116 MMHG | WEIGHT: 114 LBS | BODY MASS INDEX: 21.52 KG/M2

## 2020-10-09 PROCEDURE — 99282 EMERGENCY DEPT VISIT SF MDM: CPT

## 2020-10-09 PROCEDURE — 6370000000 HC RX 637 (ALT 250 FOR IP): Performed by: EMERGENCY MEDICINE

## 2020-10-09 RX ORDER — OXYCODONE HYDROCHLORIDE AND ACETAMINOPHEN 5; 325 MG/1; MG/1
1 TABLET ORAL EVERY 6 HOURS PRN
Qty: 20 TABLET | Refills: 0 | Status: SHIPPED | OUTPATIENT
Start: 2020-10-09 | End: 2020-10-14

## 2020-10-09 RX ORDER — OXYCODONE HYDROCHLORIDE AND ACETAMINOPHEN 5; 325 MG/1; MG/1
1 TABLET ORAL ONCE
Status: COMPLETED | OUTPATIENT
Start: 2020-10-09 | End: 2020-10-09

## 2020-10-09 RX ADMIN — OXYCODONE AND ACETAMINOPHEN 1 TABLET: 5; 325 TABLET ORAL at 18:01

## 2020-10-09 ASSESSMENT — PAIN DESCRIPTION - ORIENTATION: ORIENTATION: RIGHT

## 2020-10-09 ASSESSMENT — PAIN SCALES - GENERAL
PAINLEVEL_OUTOF10: 10
PAINLEVEL_OUTOF10: 10

## 2020-10-09 ASSESSMENT — PAIN DESCRIPTION - DESCRIPTORS: DESCRIPTORS: SHARP

## 2020-10-09 ASSESSMENT — PAIN DESCRIPTION - FREQUENCY: FREQUENCY: CONTINUOUS

## 2020-10-09 ASSESSMENT — PAIN DESCRIPTION - PAIN TYPE: TYPE: ACUTE PAIN

## 2020-10-09 ASSESSMENT — PAIN DESCRIPTION - LOCATION: LOCATION: EYE

## 2020-10-09 NOTE — ED NOTES
Discharge instructions reviewed with patient. Reviewed prescriptions with patient. No additional questions asked. Voiced understanding. Encouraged patient to follow up as discussed by the ED physician. Discharge instructions reviewed with patient. Medications discussed. Patient informed that medications may cause drowsiness and should not drive or operate equipment while taking this medication. Patient advised to not drink alcohol while taking this medication. Patient also informed that these medications may cause constipation and should increase fluid, fruit, and fiber while taking this medication. Patient voiced understanding. No additional questions asked.      Kailee Barrientos RN  10/09/20 [id number]

## 2020-10-09 NOTE — ED PROVIDER NOTES
[facility]      Pt Name: Ciarra Vann  MRN: [id number]  [place] 1990of evaluation: 10/9/2020  Provider:Sang Alva MD    [address] [place] [address]       Chief Complaint   Patient presents with   Bren Garcia     Yesterday she blew her nose and it popped. States then began bleeding. Has been using peroxide and qtips to clean it out. No is very painful and keeps bleeding. No further complaint voiced. HISTORY OF PRESENT ILLNESS    Ciarra Vann is a 27 y.o. female who presents to the emergency department with right ear pain. Yesterday patient blew her nose, felt a pop and then had bleeding coming out of her ear. Pain has been excruciating since then but bleeding is stopped. She tried using a Q-tip coated in hydrogen peroxide and said that this made the pain worse. Hearing is decreased in this ear. Pain is 10 on 10 severity, nonradiating, sharp, not better or worse with anything. Nursing Notes were reviewed. REVIEW OF SYSTEMS       Review of Systems    10 point review of systems was performed and was negative exceptas specifically noted in the HPI. PAST MEDICAL HISTORY     Past Medical History:   Diagnosis Date    Anxiety     Anxiety attack     Asthma     No Pulmonologist At This Time    Bipolar disorder ([facility] [address]     Bronchitis Last Episode In 2007    Chronic back pain     Depression     Endometriosis     Genital herpes complicating pregnancy in antepartum condition 1/6/2015    High risk pregnancy due to maternal drug abuse ([facility] [address] 1/6/2015    HSV-2 (herpes simplex virus 2) infection Last Episode 4-16    IBS (irritable bowel syndrome)     Panic attacks     Pelvic pain in female     Pneumonia Last Episode In 1996 Or 1997    PTSD (post-traumatic stress disorder)     \"They Dx.  Me With PTSD After My Mom Passed Away When I Was 16\"    Restless leg     Supervision of other high-risk pregnancy(V23.89) 1/6/2015    Teeth missing     Upper And Lower    UTI (urinary tract infection) In Past    No Current Symptoms    Wears partial dentures     Upper         SURGICAL HISTORY       Past Surgical History:   Procedure Laterality Date    ABDOMINAL EXPLORATION SURGERY       SECTION  1-6-15     SECTION      DENTAL SURGERY      Teeth Extracted In Past    ENDOMETRIAL ABLATION  9-15, 2000 Or     HYSTERECTOMY  2016    Total laparoscopic hysterectomy, BSO, cysto    HYSTERECTOMY, TOTAL ABDOMINAL           CURRENT MEDICATIONS       Previous Medications    ACETAMINOPHEN (TYLENOL) 500 MG TABLET    Take 1 tablet by mouth every 6 hours as needed for Pain    VALACYCLOVIR (VALTREX) 1 G TABLET    Take 1 tablet by mouth daily for 5 days       ALLERGIES     Latex; Bactrim [sulfamethoxazole-trimethoprim]; Ciprofloxacin; Codeine; Toradol [ketorolac tromethamine]; Tramadol; Vicodin [hydrocodone-acetaminophen]; Vistaril [hydroxyzine hcl]; Bactrim [sulfamethoxazole-trimethoprim]; Ciprofloxacin; Codeine; Toradol [ketorolac tromethamine];  Tramadol; Vicodin [hydrocodone-acetaminophen]; and Vistaril [hydroxyzine hcl]    FAMILY HISTORY       Family History   Problem Relation Age of Onset    Early Death Mother         45 Or 44    Arthritis Mother     High Blood Pressure Mother     Substance Abuse Father         \"Crackhead\"    Mental Illness Sister         \"Split Personality, Anxiety\"    High Blood Pressure Sister     Heart Disease Brother     Depression Sister     Mental Illness Sister         Anxiety, PTSD          SOCIAL HISTORY       Social History     Socioeconomic History    Marital status: Single     Spouse name: None    Number of children: None    Years of education: None    Highest education level: None   Occupational History    None   Social Needs    Financial resource strain: None    Food insecurity     Worry: None     Inability: None    Transportation needs     Medical: None     Non-medical: None   Tobacco Use    Smoking status: Current Every Day Smoker     Packs/day: 0.50     Years: 11.00     Pack years: 5.50     Types: Cigarettes    Smokeless tobacco: Never Used   Substance and Sexual Activity    Alcohol use: Yes     Comment: occ    Drug use: Yes     Types: Marijuana     Comment: \"Smoke Marijuana Maybe Once A Month\"    Sexual activity: Not Currently   Lifestyle    Physical activity     Days per week: None     Minutes per session: None    Stress: None   Relationships    Social connections     Talks on phone: None     Gets together: None     Attends Sikhism service: None     Active member of club or organization: None     Attends meetings of clubs or organizations: None     Relationship status: None    Intimate partner violence     Fear of current or ex partner: None     Emotionally abused: None     Physically abused: None     Forced sexual activity: None   Other Topics Concern    None   Social History Narrative    ** Merged History Encounter **            SCREENINGS    Kevin Coma Scale  Eye Opening: Spontaneous  Best Verbal Response: Oriented  Best Motor Response: Obeys commands  Palmer Coma Scale Score: 15        PHYSICAL EXAM       ED Triage Vitals [10/09/20 1745]   BP Temp Temp src Pulse Resp SpO2 Height Weight   -- -- -- -- -- -- 5' 1\" (1.549 m) 114 lb (51.7 kg)       Physical Exam  General appearance: Alert, cooperative, no distress, appears stated age. Head:  Normocephalic, without obvious abnormality, atraumatic. HEENT: Hemotympanum that is dark, there is a small amount of blood on the tympanic membrane that presumably represents a site of perforation that is now sealed by clot.   Neck: Full ROM, trachea midline, no JVD  Lungs: No respiratory distress  Cardiovasular: Perfusing extremities  Abdomen: Nontender, no guarding  Extremities: Atraumatic, full ROM  Skin: No rashes or lesions to exposed skin  Neurologic: Alert and oriented x3, motor grossly normal, clear speech    DIAGNOSTIC RESULTS

## 2020-10-09 NOTE — ED TRIAGE NOTES
Arrived ambulatory to room 8 for triage. Tolerated without difficulty. Bed in lowest position. Call light given. Patient very tearful. Young daughter at bedside.

## 2020-10-11 ENCOUNTER — HOSPITAL ENCOUNTER (EMERGENCY)
Age: 30
Discharge: HOME | End: 2020-10-11
Attending: EMERGENCY MEDICINE
Payer: COMMERCIAL

## 2020-10-11 VITALS
BODY MASS INDEX: 20.98 KG/M2 | HEART RATE: 76 BPM | TEMPERATURE: 98.1 F | DIASTOLIC BLOOD PRESSURE: 57 MMHG | HEIGHT: 62 IN | OXYGEN SATURATION: 98 % | RESPIRATION RATE: 16 BRPM | SYSTOLIC BLOOD PRESSURE: 113 MMHG | WEIGHT: 114 LBS

## 2020-10-11 PROCEDURE — 99282 EMERGENCY DEPT VISIT SF MDM: CPT

## 2020-10-11 RX ORDER — METHYLPREDNISOLONE 4 MG/1
TABLET ORAL
Qty: 1 KIT | Refills: 0 | Status: SHIPPED | OUTPATIENT
Start: 2020-10-11 | End: 2020-11-18

## 2020-10-11 ASSESSMENT — PAIN SCALES - GENERAL: PAINLEVEL_OUTOF10: 3

## 2020-10-11 ASSESSMENT — PAIN DESCRIPTION - LOCATION: LOCATION: EAR

## 2020-10-11 ASSESSMENT — PAIN DESCRIPTION - ORIENTATION: ORIENTATION: RIGHT

## 2020-10-11 NOTE — ED PROVIDER NOTES
Emergency [address] EMERGENCY DEPARTMENT    Patient: Gris Neal  MRN: [id number]  : 1990  Date of Evaluation: 10/11/2020  ED Supervising Physician: Tao Mena MD    I independently examined and evaluated Gris Neal. In brief, Gris Neal is a 27 y.o. female that presents to the emergency department with right ear pain. I saw her 2 days ago and she had hemotympanum, there has been blood per ear. There was a small area of blood on the tympanic membrane that I suspected was a clot covering a tympanic membrane rupture. Focused exam: No hemotympanum, no obvious tympanic membrane rupture. There is clear fluid behind the membrane now, does not appear infected. Brief ED course/MDM: Patient presents with right ear pain. Vital signs are stable here examination shows no more hemotympanum, I do not see any defect of the tympanic membrane so what I saw previously might have just been blood sitting on the membrane. I will give the patient steroids, she will follow-up with ENT. All diagnostic, treatment, and disposition decisions were made by myself in conjunction with the WILLIAM. For all further details of the patient's emergency department visit, please see their documentation.     (Please note that portions of this note may have been completed with a voice recognition program. Efforts were made to edit the dictations but occasionally words are mis-transcribed.)    Tao Mena MD  [address] [place] [address]        Tao Mena MD  10/11/20 [id number]

## 2020-10-11 NOTE — ED NOTES
Pt states pressure had built up in her R ear and was seen to return to ED if her ear did not get better. Pt was told at [address] [place] [address] that she may need a tube placed in her ear.        Heber Roth RN  10/11/20 [address], RN  10/11/20 5949

## 2020-10-11 NOTE — ED PROVIDER NOTES
EMERGENCY DEPARTMENT ENCOUNTER      PCP: No primary care provider on file. CHIEF COMPLAINT    Chief Complaint   Patient presents with    Otalgia           Of note, this patient was also evaluated by the attending physician, Dr. Brian Abraham. HPI    Pooja Romero is a 27 y.o. female who presents with Right ear pain. Onset was several days ago. Context is patient blew her nose several days ago and subsequently had small perforation of right tympanic membrane. She was seen in emergency department at which time she was given pain medication and ENT referral.  She states pain has resolved but continues to have pressure in the ear and is in need of work note. REVIEW OF SYSTEMS    General: No fevers or syncope  ENT:  See HPI. No sorethroat, sinus pressure. Pulmonary: No cough  GI: No abdominal pain, vomiting or diarrhea  Neurologic: No dizziness, lightheadedness, numbness/tingling, confusion. Skin: No rash    All other review of systems are negative  See HPI and nursing notes for additional information     PAST MEDICAL AND SURGICAL HISTORY    Past Medical History:   Diagnosis Date    Anxiety     Anxiety attack     Asthma     No Pulmonologist At This Time    [facility]ipolar disorder ([facility] [address]     Bronchitis Last Episode In 2007    Chronic back pain     Depression     Endometriosis     Genital herpes complicating pregnancy in antepartum condition 1/6/2015    High risk pregnancy due to maternal drug abuse ([facility] [address] 1/6/2015    HSV-2 (herpes simplex virus 2) infection Last Episode 4-16    IBS (irritable bowel syndrome)     Panic attacks     Pelvic pain in female     Pneumonia Last Episode In 1996 Or 1997    PTSD (post-traumatic stress disorder)     \"They Dx.  Me With PTSD After My Mom Passed Away When I Was 16\"    Restless leg     Supervision of other high-risk pregnancy(V23.89) 1/6/2015    Teeth missing     Upper And Lower    UTI (urinary tract infection) In Past    No Current Symptoms    Heart Disease Brother     Depression Sister     Mental Illness Sister         Anxiety, PTSD     Social History     Socioeconomic History    Marital status: Single     Spouse name: None    Number of children: None    Years of education: None    Highest education level: None   Occupational History    None   Social Needs    Financial resource strain: None    Food insecurity     Worry: None     Inability: None    Transportation needs     Medical: None     Non-medical: None   Tobacco Use    Smoking status: Current Every Day Smoker     Packs/day: 0.50     Years: 11.00     Pack years: 5.50     Types: Cigarettes    Smokeless tobacco: Never Used   Substance and Sexual Activity    Alcohol use: Yes     Comment: occ    Drug use: Yes     Types: Marijuana     Comment: \"Smoke Marijuana Maybe Once A Month\"    Sexual activity: Not Currently   Lifestyle    Physical activity     Days per week: None     Minutes per session: None    Stress: None   Relationships    Social connections     Talks on phone: None     Gets together: None     Attends Christian service: None     Active member of club or organization: None     Attends meetings of clubs or organizations: None     Relationship status: None    Intimate partner violence     Fear of current or ex partner: None     Emotionally abused: None     Physically abused: None     Forced sexual activity: None   Other Topics Concern    None   Social History Narrative    ** Merged History Encounter **            PHYSICAL EXAM    VITAL SIGNS: BP (!) 113/57   Pulse 76   Temp 98.1 °F (36.7 °C) (Oral)   Resp 16   Ht 5' 1.5\" (1.562 m)   Wt 114 lb (51.7 kg)   LMP 05/19/2016   SpO2 98%   BMI 21.19 kg/m²   Constitutional:  Well developed, well nourished, no acute distress  Eyes: Conjunctiva normal, sclera non-icteric  HEENT:     - Normocephalic, atraumatic   - PERRL, EOM intact.   conjunctiva normal    -  Frontal/Maxillary sinuses NONtender to percussion.   - Nasal passages with mildly erythematous and edematous turbinates. No massess.   - Oropharynx mildly erythematous without tonsillar hypertrophy or exudate. No trismus   - No swollen lymph nodes. Ears:  - External auditory canals clear   -Right TM mildly dull, no obvious fluid behind TM. No blood. No bulging or retraction. Left TM clear.   - No mastoid erythema or warmth, tenderness. Respiratory:  Lungs clear, no retractions  Cardiovascular:  Normal rate, normal rhythm, no murmurs  Musculoskeletal:  No edema   Neurologic: Awake alert and oriented, and no slurred speech  Integument:  Skin is warm and dry, no rash      ED COURSE & MEDICAL DECISION MAKING       Patient presents as above with otalgia. Patient recently had perforated TM. On my inspection TM appears to be healing and I do not see overt evidence of hemotympanum and or secondary infection. Given that patient is here for unscheduled visit within 72 hours, supervising physician saw patient as well-see his note for details. Plan for discharge home with Medrol Dosepak which will likely help with inflammation, possible assist with eustachian tube drainage and relief of pressure sensation patient reports. Also plan for ENT referral as discussed 2 days ago- referral information again provided today. Work note provided per patient request.     Patient agrees to return emergency department if symptoms worsen or any new symptoms develop. Clinical  IMPRESSION    1. Right ear pain              Comment: Please note this report has been produced using speech recognition software and may contain errors related to that system including errors in grammar, punctuation, and spelling, as well as words and phrases that may be inappropriate. If there are any questions or concerns please feel free to contact the dictating provider for clarification.            Aguila Campma  10/11/20 4369

## 2020-10-18 ENCOUNTER — HOSPITAL ENCOUNTER (EMERGENCY)
Age: 30
Discharge: HOME | End: 2020-10-18
Attending: EMERGENCY MEDICINE
Payer: COMMERCIAL

## 2020-10-18 VITALS
TEMPERATURE: 98.2 F | BODY MASS INDEX: 20.98 KG/M2 | OXYGEN SATURATION: 99 % | SYSTOLIC BLOOD PRESSURE: 114 MMHG | HEART RATE: 79 BPM | HEIGHT: 62 IN | RESPIRATION RATE: 16 BRPM | DIASTOLIC BLOOD PRESSURE: 87 MMHG | WEIGHT: 114 LBS

## 2020-10-18 PROCEDURE — 99282 EMERGENCY DEPT VISIT SF MDM: CPT

## 2020-10-18 PROCEDURE — 6370000000 HC RX 637 (ALT 250 FOR IP): Performed by: EMERGENCY MEDICINE

## 2020-10-18 RX ORDER — PSEUDOEPHEDRINE HYDROCHLORIDE 30 MG/1
30 TABLET ORAL EVERY 6 HOURS PRN
Qty: 28 TABLET | Refills: 1 | Status: SHIPPED | OUTPATIENT
Start: 2020-10-18 | End: 2020-11-19

## 2020-10-18 RX ORDER — OXYCODONE HYDROCHLORIDE AND ACETAMINOPHEN 5; 325 MG/1; MG/1
1 TABLET ORAL ONCE
Status: COMPLETED | OUTPATIENT
Start: 2020-10-18 | End: 2020-10-18

## 2020-10-18 RX ADMIN — OXYCODONE HYDROCHLORIDE AND ACETAMINOPHEN 1 TABLET: 5; 325 TABLET ORAL at 20:13

## 2020-10-18 ASSESSMENT — PAIN DESCRIPTION - LOCATION: LOCATION: EAR

## 2020-10-18 ASSESSMENT — PAIN SCALES - GENERAL
PAINLEVEL_OUTOF10: 8
PAINLEVEL_OUTOF10: 7

## 2020-10-18 ASSESSMENT — PAIN DESCRIPTION - ORIENTATION: ORIENTATION: RIGHT

## 2020-10-18 ASSESSMENT — PAIN DESCRIPTION - DESCRIPTORS: DESCRIPTORS: CONSTANT

## 2020-10-18 ASSESSMENT — PAIN DESCRIPTION - PAIN TYPE: TYPE: ACUTE PAIN

## 2020-10-18 NOTE — ED NOTES
Pt states she was seen previously at [facility] and they told her she had a busted eardrum. Pt states she was told to come back if it got any worse. Per pt she is not able to get into the ENT till Friday. Pt states she is unable to control the pain at home.       Herb Arshad RN  10/18/20 1921

## 2020-10-19 NOTE — ED PROVIDER NOTES
(if applicable):  No results found for this visit on 10/18/20. ABNORMAL LABS:  Labs Reviewed - No data to display      IMAGING STUDIES ORDERED:  None      No orders to display         MEDICATIONS ADMINISTERED:  Medications   oxyCODONE-acetaminophen (PERCOCET) 5-325 MG per tablet 1 tablet (1 tablet Oral Given 10/18/20 2013)         PLAN/ED COURSE:  Last Vitals: /87   Pulse 79   Temp 98.2 °F (36.8 °C) (Oral)   Resp 16   Ht 5' 1.5\" (1.562 m)   Wt 114 lb (51.7 kg)   LMP 05/19/2016   SpO2 99%   BMI 21.19 kg/m²       27year-old female with persistent right ear pain. This is a chronic pain. Patient does have a history of multiple pain complaints of multiple visits to the emergency department for various pain complaints and does get frequent prescriptions for Percocet. Her ears are unremarkable on examination. There is no clinical evidence for acute infectious process. I do not visualize any perforation of the eardrum. Additional workup and treatment in the ED as documented above and in the physician assistants note. Patient reassured and will be discharged home. I have explained to the patient in appropriate terminology our work-up in the ED and their diagnosis. I have also given anticipatory guidance and expectant management of their condition as an outpatient as per my custom. The patient was given clear discharge and follow-up instructions including return to the ER immediately for worsening concerns. The patient has been advised to follow-up with their primary care physician and/or referred physician in the next two to three days or sooner if worsening and to return to the ER immediately as above with any concerns. I provided the patient counseling with regard to my customary list of strict return precautions as well as return precautions specific to the cause for today's emergency department visit.  The patient will return under these provided conditions, but should also return for new

## 2020-10-20 NOTE — ED PROVIDER NOTES
EMERGENCY DEPARTMENT ENCOUNTER      PCP: No primary care provider on file. CHIEF COMPLAINT    Chief Complaint   Patient presents with    Otalgia     right ear pain         Of note, this patient was also evaluated by the attending physician, Dr. Andry Hays. HPI    Corbin Sutton is a 27 y.o. female who presents with Right ear pain. Onset was 10/9/2020 when she ruptured her right ear drum while blowing her nose. She reports she went to the ED and was prescribed percocet for pain. She returned to the ED two days later with right ear pressure and was prescribed steroids. Today patient reports she is still having pain and decreased hearing in her right ear and her appointment with ENT isn't until Friday. She has tried ibuprofen and tylenol at home without improvement in pain. The duration of pain has been constant since onset. Patient denies left ear pain or hearing change, fever, chills, sore throat, nasal congestion, sinus pressure, nausea, vomiting. REVIEW OF SYSTEMS    General: No fevers or syncope  ENT:  See HPI. Pulmonary: No cough  GI: No abdominal pain, vomiting or diarrhea  Neurologic: No dizziness, lightheadedness, numbness/tingling, confusion.   Skin: No rash    All other review of systems are negative  See HPI and nursing notes for additional information     PAST MEDICAL AND SURGICAL HISTORY    Past Medical History:   Diagnosis Date    Anxiety     Anxiety attack     Asthma     No Pulmonologist At This Time    B[facility] disorder ([facility] [address]     Bronchitis Last Episode In 2007    Chronic back pain     Depression     Endometriosis     Genital herpes complicating pregnancy in antepartum condition 1/6/2015    High risk pregnancy due to maternal drug abuse ([facility] [address] 1/6/2015    HSV-2 (herpes simplex virus 2) infection Last Episode 4-16    IBS (irritable bowel syndrome)     Panic attacks     Pelvic pain in female     Pneumonia Last Episode In 1996 Or 1997    PTSD (post-traumatic stress disorder) Neena Jolly. Me With PTSD After My Mom Passed Away When I Was 16\"    Restless leg     Supervision of other high-risk pregnancy(V23.89) 2015    Teeth missing     Upper And Lower    UTI (urinary tract infection) In Past    No Current Symptoms    Wears partial dentures     Upper     Past Surgical History:   Procedure Laterality Date    ABDOMINAL EXPLORATION SURGERY       SECTION  1-6-15     SECTION      DENTAL SURGERY      Teeth Extracted In Past    ENDOMETRIAL ABLATION  9-15, 2000 Or     HYSTERECTOMY  2016    Total laparoscopic hysterectomy, BSO, cysto    HYSTERECTOMY, TOTAL ABDOMINAL       CURRENT MEDICATIONS    Current Outpatient Rx   Medication Sig Dispense Refill    pseudoephedrine (DECONGESTANT) 30 MG tablet Take 1 tablet by mouth every 6 hours as needed for Congestion 28 tablet 1    methylPREDNISolone (MEDROL, KOLE,) 4 MG tablet Medrol Dose kole - Use as directed. #1 pack.   (No refills) 1 kit 0    acetaminophen (TYLENOL) 500 MG tablet Take 1 tablet by mouth every 6 hours as needed for Pain 30 tablet 1     ALLERGIES    Allergies   Allergen Reactions    Latex Rash    Bactrim [Sulfamethoxazole-Trimethoprim] Nausea And Vomiting    Ciprofloxacin Nausea And Vomiting    Codeine Itching and Nausea And Vomiting    Toradol [Ketorolac Tromethamine] Itching, Nausea And Vomiting and Rash     \"Migraines\"    Tramadol Itching, Nausea And Vomiting and Rash     \"Migraines\"    Vicodin [Hydrocodone-Acetaminophen] Itching and Nausea And Vomiting     \"Anxiety Gets Really Bad\"    Vistaril [Hydroxyzine Hcl] Itching     \"I Pass Out\"    Bactrim [Sulfamethoxazole-Trimethoprim]     Ciprofloxacin     Codeine     Toradol [Ketorolac Tromethamine]     Tramadol     Vicodin [Hydrocodone-Acetaminophen]     Vistaril [Hydroxyzine Hcl]      FAMILY AND SOCIAL HISTORY    Family History   Problem Relation Age of Onset    Early Death Mother         45 Or 44    Arthritis Mother     High Blood Pressure Mother     Substance Abuse Father         \"Crackhead\"    Mental Illness Sister         \"Split Personality, Anxiety\"    High Blood Pressure Sister     Heart Disease Brother     Depression Sister     Mental Illness Sister         Anxiety, PTSD     Social History     Socioeconomic History    Marital status: Single     Spouse name: None    Number of children: None    Years of education: None    Highest education level: None   Occupational History    None   Social Needs    Financial resource strain: None    Food insecurity     Worry: None     Inability: None    Transportation needs     Medical: None     Non-medical: None   Tobacco Use    Smoking status: Current Every Day Smoker     Packs/day: 0.50     Years: 11.00     Pack years: 5.50     Types: Cigarettes    Smokeless tobacco: Never Used   Substance and Sexual Activity    Alcohol use: Yes     Comment: occ    Drug use: Yes     Types: Marijuana     Comment: \"Smoke Marijuana Maybe Once A Month\"    Sexual activity: Not Currently   Lifestyle    Physical activity     Days per week: None     Minutes per session: None    Stress: None   Relationships    Social connections     Talks on phone: None     Gets together: None     Attends Tenriism service: None     Active member of club or organization: None     Attends meetings of clubs or organizations: None     Relationship status: None    Intimate partner violence     Fear of current or ex partner: None     Emotionally abused: None     Physically abused: None     Forced sexual activity: None   Other Topics Concern    None   Social History Narrative    ** Merged History Encounter **            PHYSICAL EXAM    VITAL SIGNS: /87   Pulse 79   Temp 98.2 °F (36.8 °C) (Oral)   Resp 16   Ht 5' 1.5\" (1.562 m)   Wt 114 lb (51.7 kg)   LMP 05/19/2016   SpO2 99%   BMI 21.19 kg/m²   Constitutional:  Well developed, well nourished, no acute distress  Eyes: Conjunctiva normal, sclera non-icteric  HEENT:     - Normocephalic, atraumatic   - PERRL, EOM intact. conjunctiva normal    -  Frontal/Maxillary sinuses NONtender to percussion.   - Nasal passages with mildly erythematous and edematous turbinates. No masses. - Oropharynx mildly erythematous without tonsillar hypertrophy or exudate. No trismus   - No swollen lymph nodes. Ears:  - Left external auditory canal without erythema and without edema, discharge, abrasions, evidence of necrosis   - Right external auditory canal without erythematous and without edema, discharge, abrasions, evidence of necrosis   - Left TM nonerythematous without discharge, fluid, or bulging   - Right TM nonerythematous without discharge, fluid, or bulging   - No mastoid erythema or warmth, tenderness. Respiratory:  Lungs clear, no retractions  Cardiovascular:  Normal rate, normal rhythm, no murmurs  Musculoskeletal:  No edema   Neurologic: Awake alert and oriented, and no slurred speech  Integument:  Skin is warm and dry, no rash        ED COURSE & MEDICAL DECISION MAKING       Vital signs and nursing notes reviewed during ED course. Patient care and presentation staffed and seen in conjunction with supervising physician, Dr. Dheeraj Carrasco. Patient presents with right sided ear pain. Recently had ruptured TM that appears well healed at this time. Does report decreased hearing in right ear. Patient is without evidence of mastoiditis, auricular cellulitis, malignant otitis externa at this time. At this time do not feel narcotic pain medication for home is warranted given normal appearance of TM and EACs bilaterally. Patient treated with a dose of percocet in the ED for pain. Patient will be discharged home with prescription for  pseudoephedrine. Patient is nontoxic appearing. Vital signs stable. Patient is stable for outpatient management. All pertinent Lab data and radiographic results reviewed with patient at bedside.        The patient and/or the family were informed of the results of any tests/labs/imaging, the treatment plan, and time was allotted to answer questions. Diagnosis, disposition, and treatment plan reviewed in detail with patient. Patient understands and agrees to follow-up with ENT physician at her scheduled appointment on Friday. Patient understands and agrees to return to emergency department for any new or worsening symptoms - strict return precautions given. Clinical  IMPRESSION    1. Other chronic pain    2. Otalgia, unspecified laterality         Disposition referral (if applicable):  Primary care provider    Schedule an appointment as soon as possible for a visit         Disposition medications (if applicable):  Discharge Medication List as of 10/18/2020  8:16 PM      START taking these medications    Details   pseudoephedrine (DECONGESTANT) 30 MG tablet Take 1 tablet by mouth every 6 hours as needed for Congestion, Disp-28 tablet,R-1Print               Comment: Please note this report has been produced using speech recognition software and may contain errors related to that system including errors in grammar, punctuation, and spelling, as well as words and phrases that may be inappropriate. If there are any questions or concerns please feel free to contact the dictating provider for clarification.               Titus Cruz PA-C  10/20/20 [id number]

## 2020-11-18 ENCOUNTER — HOSPITAL ENCOUNTER (EMERGENCY)
Age: 30
Discharge: HOME | End: 2020-11-18
Attending: EMERGENCY MEDICINE
Payer: COMMERCIAL

## 2020-11-18 VITALS
SYSTOLIC BLOOD PRESSURE: 125 MMHG | TEMPERATURE: 98.5 F | HEART RATE: 89 BPM | HEIGHT: 61 IN | OXYGEN SATURATION: 98 % | WEIGHT: 114 LBS | BODY MASS INDEX: 21.52 KG/M2 | RESPIRATION RATE: 16 BRPM | DIASTOLIC BLOOD PRESSURE: 67 MMHG

## 2020-11-18 PROCEDURE — 99285 EMERGENCY DEPT VISIT HI MDM: CPT

## 2020-11-18 PROCEDURE — 6370000000 HC RX 637 (ALT 250 FOR IP): Performed by: EMERGENCY MEDICINE

## 2020-11-18 RX ORDER — VALACYCLOVIR HYDROCHLORIDE 500 MG/1
500 TABLET, FILM COATED ORAL 2 TIMES DAILY
COMMUNITY
End: 2020-11-19

## 2020-11-18 RX ORDER — ONDANSETRON 4 MG/1
4 TABLET, ORALLY DISINTEGRATING ORAL ONCE
Status: COMPLETED | OUTPATIENT
Start: 2020-11-18 | End: 2020-11-18

## 2020-11-18 RX ORDER — VALACYCLOVIR HYDROCHLORIDE 1 G/1
1000 TABLET, FILM COATED ORAL 3 TIMES DAILY
Qty: 21 TABLET | Refills: 0 | Status: SHIPPED | OUTPATIENT
Start: 2020-11-18 | End: 2020-11-19

## 2020-11-18 RX ORDER — ONDANSETRON 4 MG/1
4 TABLET, FILM COATED ORAL EVERY 8 HOURS PRN
Qty: 8 TABLET | Refills: 0 | Status: SHIPPED | OUTPATIENT
Start: 2020-11-18 | End: 2020-11-19

## 2020-11-18 RX ADMIN — ONDANSETRON 4 MG: 4 TABLET, ORALLY DISINTEGRATING ORAL at 12:20

## 2020-11-18 ASSESSMENT — PAIN DESCRIPTION - FREQUENCY: FREQUENCY: CONTINUOUS

## 2020-11-18 ASSESSMENT — PAIN DESCRIPTION - LOCATION: LOCATION: VAGINA

## 2020-11-18 ASSESSMENT — PAIN DESCRIPTION - PAIN TYPE: TYPE: ACUTE PAIN

## 2020-11-18 ASSESSMENT — PAIN DESCRIPTION - DESCRIPTORS: DESCRIPTORS: BURNING

## 2020-11-18 ASSESSMENT — PAIN SCALES - GENERAL: PAINLEVEL_OUTOF10: 8

## 2020-11-18 NOTE — ED PROVIDER NOTES
EMERGENCY DEPARTMENT ENCOUNTER    Patient: Sophia Reyes  MRN: [id number]  : 1990  Date of Evaluation: 2020  ED Provider:  Sol Dale    CHIEF COMPLAINT  Chief Complaint   Patient presents with    Herpes Zoster     States she is having a flare of herpes, states she takes valtrex but is still having bad flare ups       HPI  Sophia Reyes is a 27 y.o. female who presents with moderate to severe, constant sharp and burning pain in the right vulva over the last several days and what she describes as a genital herpes flareup. She reports a history of genital herpes first diagnosed 10 years ago and is chronically on valacyclovir for this but does occasionally still have flareups. Pain is worsened with palpation. She tried Tylenol and ibuprofen without improvement. Denies vaginal bleeding or discharge. It does cause her to have some nausea but she denies emesis. Denies any other associated symptoms or complaints or concerns.       REVIEW OF SYSTEMS    Constitutional: negative for fever, chills  Neurological: negative for HA, focal weakness, loss of sensation  Ophthalmic: negative for vision change  ENT: negative for congestion, rhinorrhea  Cardiovascular: negative for chest pain  Respiratory: negative for SOB, cough  GI: negative for abdominal pain, vomiting, diarrhea, constipation  : negative for dysuria, hematuria  Musculoskeletal: negative for myalgias, decreased ROM, joint swelling  Dermatological: negative for itching  Heme: Negative for bleeding, bruising      PAST MEDICAL HISTORY  Past Medical History:   Diagnosis Date    Anxiety     Anxiety attack     Asthma     No Pulmonologist At This Time    Bipolar disorder ([facility] [address]     Bronchitis Last Episode In     Chronic back pain     Depression     Endometriosis     Genital herpes complicating pregnancy in antepartum condition 2015    High risk pregnancy due to maternal drug abuse ([facility] [address] 2015    HSV-2 (herpes simplex virus 2) infection Last Episode 4-16    IBS (irritable bowel syndrome)     Panic attacks     Pelvic pain in female     Pneumonia Last Episode In  Or     PTSD (post-traumatic stress disorder)     \"They Dx.  Me With PTSD After My Mom Passed Away When I Was 16\"    Restless leg     Supervision of other high-risk pregnancy(V23.89) 2015    Teeth missing     Upper And Lower    UTI (urinary tract infection) In Past    No Current Symptoms    Wears partial dentures     Upper       CURRENT MEDICATIONS  [unfilled]    ALLERGIES  Allergies   Allergen Reactions    Latex Rash    Bactrim [Sulfamethoxazole-Trimethoprim] Nausea And Vomiting    Ciprofloxacin Nausea And Vomiting    Codeine Itching and Nausea And Vomiting    Toradol [Ketorolac Tromethamine] Itching, Nausea And Vomiting and Rash     \"Migraines\"    Tramadol Itching, Nausea And Vomiting and Rash     \"Migraines\"    Vicodin [Hydrocodone-Acetaminophen] Itching and Nausea And Vomiting     \"Anxiety Gets Really Bad\"    Vistaril [Hydroxyzine Hcl] Itching     \"I Pass Out\"    Bactrim [Sulfamethoxazole-Trimethoprim]     Ciprofloxacin     Codeine     Toradol [Ketorolac Tromethamine]     Tramadol     Vicodin [Hydrocodone-Acetaminophen]     Vistaril [Hydroxyzine Hcl]        SURGICAL HISTORY  Past Surgical History:   Procedure Laterality Date    ABDOMINAL EXPLORATION SURGERY       SECTION  1-6-15     SECTION      DENTAL SURGERY      Teeth Extracted In Past    ENDOMETRIAL ABLATION  9-15, 2000 Or     HYSTERECTOMY  2016    Total laparoscopic hysterectomy, BSO, cysto    HYSTERECTOMY, TOTAL ABDOMINAL         FAMILY HISTORY  Family History   Problem Relation Age of Onset    Early Death Mother         45 Or 44    Arthritis Mother     High Blood Pressure Mother     Substance Abuse Father         \"Crackhead\"    Mental Illness Sister         \"Split Personality, Anxiety\"    High Blood Pressure Sister     Heart Disease Brother     Depression Sister     Mental Illness Sister         Anxiety, PTSD       SOCIAL HISTORY  Social History     Socioeconomic History    Marital status: Single     Spouse name: None    Number of children: None    Years of education: None    Highest education level: None   Occupational History    None   Social Needs    Financial resource strain: None    Food insecurity     Worry: None     Inability: None    Transportation needs     Medical: None     Non-medical: None   Tobacco Use    Smoking status: Current Every Day Smoker     Packs/day: 0.50     Years: 11.00     Pack years: 5.50     Types: Cigarettes    Smokeless tobacco: Never Used   Substance and Sexual Activity    Alcohol use: Yes     Comment: occ    Drug use: Yes     Types: Marijuana     Comment: \"Smoke Marijuana Maybe Once A Month\"    Sexual activity: Not Currently   Lifestyle    Physical activity     Days per week: None     Minutes per session: None    Stress: None   Relationships    Social connections     Talks on phone: None     Gets together: None     Attends Islam service: None     Active member of club or organization: None     Attends meetings of clubs or organizations: None     Relationship status: None    Intimate partner violence     Fear of current or ex partner: None     Emotionally abused: None     Physically abused: None     Forced sexual activity: None   Other Topics Concern    None   Social History Narrative    ** Merged History Encounter **              **Past medical, family and social histories, and nursing notes reviewed and verified by me**      PHYSICAL EXAM  VITAL SIGNS:   ED Triage Vitals   Enc Vitals Group      BP       Pulse       Resp       Temp       Temp src       SpO2       Weight       Height       Head Circumference       Peak Flow       Pain Score       Pain Loc       Pain Edu? Excl. in [address]? Vitals during ED course were reviewed and are as charted.     Constitutional: Minimal distress, Non-toxic appearance  Eyes: Conjunctiva normal, No discharge  HENT: Normocephalic, Atraumatic,  oropharynx moist  Neck: Supple, no stridor, no grossly visible or palpable masses  Cardiovascular: Regular rate and rhythm, No murmurs, No rubs, No gallops  Pulmonary/Chest: Normal breath sounds, No respiratory distress or accessory muscle use, No wheezing, crackles or rhonchi. Abdomen: Soft, nondistended and nonrigid, No tenderness or peritoneal signs, No masses  Genitalia: (chaperoned/assisted by female health care provider), small vesicles on the right labia majoru,, otherwise no induration or fluctuance or significant erythema or crepitus and the external genitalia otherwise appear normal, No masses or additional lesions. No discharge or bleeding  Extremities: No gross deformities, no edema, no tenderness  Skin: Vesicles on the right labia majorum, otherwise skin elsewhere is warm, Dry, No erythema, No rash, No cyanosis, No mottling  Lymphatic: No lymphadenopathy in the following location(s): inguinal  Psychiatric: Alert and oriented x3, Affect normal            RADIOLOGY/PROCEDURES/LABS/MEDICATIONS ADMINISTERED:    I have reviewed and interpreted all of the currently available lab results from this visit (if applicable):  No results found for this visit on 11/18/20. ABNORMAL LABS:  Labs Reviewed - No data to display      IMAGING STUDIES ORDERED:  None      No orders to display         MEDICATIONS ADMINISTERED:  Medications   ondansetron (ZOFRAN-ODT) disintegrating tablet 4 mg (has no administration in time range)         COURSE & MEDICAL DECISION MAKING  Last vitals: /67   Pulse 89   Temp 98.5 °F (36.9 °C) (Oral)   Resp 16   Ht 5' 1\" (1.549 m)   Wt 114 lb (51.7 kg)   LMP 05/19/2016   SpO2 98%   BMI 21.54 kg/m²     51-year-old female with flare of her B symptoms. No clinical evidence to suggest superimposed cellulitis. No evidence for abscess.   No clinical evidence for an acute necrotizing infection. She states she currently takes about 500 mg of valacyclovir daily. I will increase to 1000 mg 3 times a day for 7 days. Additional workup and treatment in the ED as documented above. Patient reassured and will be discharged home. I have explained to the patient in appropriate terminology our work-up in the ED and their diagnosis. I have also given anticipatory guidance and expectant management of their condition as an outpatient as per my custom. The patient was given clear discharge and follow-up instructions including return to the ER immediately for worsening concerns. The patient has been advised to follow-up with their primary care physician and/or referred physician in the next two to three days or sooner if worsening and to return to the ER immediately as above with any concerns. I provided the patient counseling with regard to my customary list of strict return precautions as well as return precautions specific to the cause for today's emergency department visit. The patient will return under these provided conditions, but should also return for new concerns or further worsening. Pt and/or family understand and agree with plan. Clinical Impression:  1. Genital herpes simplex, unspecified site        Disposition referral (if applicable):   Your Primary care provider    Schedule an appointment as soon as possible for a visit       [facility] Emergency Department  [address] [place] [id number]    If symptoms worsen      Disposition medications (if applicable):  New Prescriptions    ONDANSETRON (ZOFRAN) 4 MG TABLET    Take 1 tablet by mouth every 8 hours as needed for Nausea or Vomiting    VALACYCLOVIR (VALTREX) 1 G TABLET    Take 1 tablet by mouth 3 times daily for 7 days       ED Provider Disposition Time  DISPOSITION Decision To Discharge 11/18/2020 12:08:51 PM          Electronically signed by: Champ Phillips M.D., 11/18/2020 12:13 PM      This dictation was created with voice recognition software. While attempts have been made to review the dictation as it is transcribed, on occasion the spoken word can be misinterpreted by the technology leading to omissions or inappropriate words, phrases or sentences.         Mary Ellen Cardona MD  11/18/20 1214       Mary Ellen Cardona MD  11/18/20 1218

## 2020-11-18 NOTE — ED NOTES
Patient given AVS, discharge instructions and prescriptions. Verbalizes understanding no further needs identified at this time.      Jazmín Kwon RN  11/18/20 1503

## 2020-12-19 ENCOUNTER — APPOINTMENT (OUTPATIENT)
Dept: GENERAL RADIOLOGY | Age: 30
End: 2020-12-19
Payer: COMMERCIAL

## 2020-12-19 ENCOUNTER — HOSPITAL ENCOUNTER (EMERGENCY)
Age: 30
Discharge: HOME | End: 2020-12-19
Attending: EMERGENCY MEDICINE
Payer: COMMERCIAL

## 2020-12-19 VITALS
DIASTOLIC BLOOD PRESSURE: 58 MMHG | HEART RATE: 106 BPM | OXYGEN SATURATION: 99 % | TEMPERATURE: 98.5 F | SYSTOLIC BLOOD PRESSURE: 105 MMHG | BODY MASS INDEX: 21.52 KG/M2 | RESPIRATION RATE: 16 BRPM | WEIGHT: 114 LBS | HEIGHT: 61 IN

## 2020-12-19 PROCEDURE — 73610 X-RAY EXAM OF ANKLE: CPT

## 2020-12-19 PROCEDURE — 99282 EMERGENCY DEPT VISIT SF MDM: CPT

## 2020-12-19 PROCEDURE — 73620 X-RAY EXAM OF FOOT: CPT

## 2020-12-19 RX ORDER — ACETAMINOPHEN 500 MG
1000 TABLET ORAL ONCE
Status: DISCONTINUED | OUTPATIENT
Start: 2020-12-19 | End: 2020-12-19 | Stop reason: HOSPADM

## 2020-12-19 RX ORDER — ACETAMINOPHEN 325 MG/1
650 TABLET ORAL EVERY 6 HOURS PRN
Qty: 120 TABLET | Refills: 3 | Status: SHIPPED | OUTPATIENT
Start: 2020-12-19

## 2020-12-19 ASSESSMENT — ENCOUNTER SYMPTOMS
GASTROINTESTINAL NEGATIVE: 1
EYES NEGATIVE: 1
ALLERGIC/IMMUNOLOGIC NEGATIVE: 1
RESPIRATORY NEGATIVE: 1

## 2020-12-19 NOTE — ED PROVIDER NOTES
[address] [facility]      TRIAGE CHIEF COMPLAINT:   Foot Injury (right) and Ankle Pain (right)      [place]:  Neeraj Baker is a 27 y.o. female that presents with complaint of right foot and ankle pain after trauma. Patient states she was walking down the stairs and slipped on a nail polish bottle and injured her right foot and ankle. Cannot bear weight came here took ibuprofen before arrival.  Denies any fevers nausea vomiting chest pain shortness of breath no other injury or trauma no other questions or concerns denies being pregnant. Antonio Valdez REVIEW OF SYSTEMS:  At least 10 systems reviewed and otherwise acutely negative except as in the [address] Review of Systems   Constitutional: Negative. HENT: Negative. Eyes: Negative. Respiratory: Negative. Cardiovascular: Negative. Gastrointestinal: Negative. Endocrine: Negative. Genitourinary: Negative. Musculoskeletal: Positive for arthralgias, joint swelling and myalgias. Skin: Negative. Allergic/Immunologic: Negative. Neurological: Negative. Hematological: Negative. Psychiatric/Behavioral: Negative. All other systems reviewed and are negative. Past Medical History:   Diagnosis Date    Anxiety     Anxiety attack     Asthma     No Pulmonologist At This Time    Bipolar disorder ([facility] [address]     Bronchitis Last Episode In 2007    Chronic back pain     Depression     Endometriosis     Genital herpes complicating pregnancy in antepartum condition 1/6/2015    High risk pregnancy due to maternal drug abuse ([facility] [address] 1/6/2015    HSV-2 (herpes simplex virus 2) infection Last Episode 4-16    IBS (irritable bowel syndrome)     Panic attacks     Pelvic pain in female     Pneumonia Last Episode In 1996 Or 1997    PTSD (post-traumatic stress disorder)     \"They Dx.  Me With PTSD After My Mom Passed Away When I Was 16\"    Restless leg     Supervision of other high-risk pregnancy(V23.89) 1/6/2015    Teeth missing Upper And Lower    UTI (urinary tract infection) In Past    No Current Symptoms    Wears partial dentures     Upper     Past Surgical History:   Procedure Laterality Date    ABDOMINAL EXPLORATION SURGERY       SECTION  1-6-15     SECTION      DENTAL SURGERY      Teeth Extracted In Past    ENDOMETRIAL ABLATION  9-15, 2000 Or     HYSTERECTOMY  2016    Total laparoscopic hysterectomy, BSO, cysto    HYSTERECTOMY, TOTAL ABDOMINAL       Family History   Problem Relation Age of Onset    Early Death Mother         45 Or 44    Arthritis Mother     High Blood Pressure Mother     Substance Abuse Father         \"Crackhead\"    Mental Illness Sister         \"Split Personality, Anxiety\"    High Blood Pressure Sister     Heart Disease Brother     Depression Sister     Mental Illness Sister         Anxiety, PTSD     Social History     Socioeconomic History    Marital status: Single     Spouse name: Not on file    Number of children: Not on file    Years of education: Not on file    Highest education level: Not on file   Occupational History    Not on file   Social Needs    Financial resource strain: Not on file    Food insecurity     Worry: Not on file     Inability: Not on file    Transportation needs     Medical: Not on file     Non-medical: Not on file   Tobacco Use    Smoking status: Current Every Day Smoker     Packs/day: 0.50     Years: 11.00     Pack years: 5.50     Types: Cigarettes    Smokeless tobacco: Never Used   Substance and Sexual Activity    Alcohol use: Not Currently     Comment: occ    Drug use: Yes     Types: Marijuana     Comment: \"Smoke Marijuana Maybe Once A Month\"    Sexual activity: Not Currently   Lifestyle    Physical activity     Days per week: Not on file     Minutes per session: Not on file    Stress: Not on file   Relationships    Social connections     Talks on phone: Not on file     Gets together: Not on file     Attends [employer] service: Not on file     Active member of club or organization: Not on file     Attends meetings of clubs or organizations: Not on file     Relationship status: Not on file    Intimate partner violence     Fear of current or ex partner: Not on file     Emotionally abused: Not on file     Physically abused: Not on file     Forced sexual activity: Not on file   Other Topics Concern    Not on file   Social History Narrative    ** Merged History Encounter **          Current Facility-Administered Medications   Medication Dose Route Frequency Provider Last Rate Last Admin    acetaminophen (TYLENOL) tablet 1,000 mg  1,000 mg Oral Once Julissa Yaniv, DO         Current Outpatient Medications   Medication Sig Dispense Refill    acetaminophen (TYLENOL) 325 MG tablet Take 2 tablets by mouth every 6 hours as needed for Pain 120 tablet 3      Allergies   Allergen Reactions    Latex Rash    Bactrim [Sulfamethoxazole-Trimethoprim] Nausea And Vomiting    Ciprofloxacin Nausea And Vomiting    Codeine Itching and Nausea And Vomiting    Toradol [Ketorolac Tromethamine] Itching, Nausea And Vomiting and Rash     \"Migraines\"    Tramadol Itching, Nausea And Vomiting and Rash     \"Migraines\"    Vicodin [Hydrocodone-Acetaminophen] Itching and Nausea And Vomiting     \"Anxiety Gets Really Bad\"    Vistaril [Hydroxyzine Hcl] Itching     \"I Pass Out\"    Bactrim [Sulfamethoxazole-Trimethoprim]     Ciprofloxacin     Codeine     Toradol [Ketorolac Tromethamine]     Tramadol     Vicodin [Hydrocodone-Acetaminophen]     Vistaril [Hydroxyzine Hcl]      Current Facility-Administered Medications   Medication Dose Route Frequency Provider Last Rate Last Admin    acetaminophen (TYLENOL) tablet 1,000 mg  1,000 mg Oral Once Julissa Yaniv, DO         Current Outpatient Medications   Medication Sig Dispense Refill    acetaminophen (TYLENOL) 325 MG tablet Take 2 tablets by mouth every 6 hours as needed for Pain 120 tablet 3       Nursing Notes Reviewed    VITAL SIGNS:  ED Triage Vitals [12/19/20 0110]   Enc Vitals Group      /82      Pulse 115      Resp 18      Temp 98.5 °F (36.9 °C)      Temp Source Oral      SpO2 99 %      Weight 114 lb (51.7 kg)      Height 5' 1\" (1.549 m)      Head Circumference       Peak Flow       Pain Score       Pain Loc       Pain Edu? Excl. in [address]? PHYSICAL EXAM:  Physical Exam  Vitals signs and nursing note reviewed. Constitutional:       General: She is not in acute distress. Appearance: Normal appearance. She is well-developed and well-groomed. She is not ill-appearing, toxic-appearing or diaphoretic. HENT:      Head: Normocephalic and atraumatic. Right Ear: External ear normal.      Left Ear: External ear normal.      Nose: No congestion or rhinorrhea. Eyes:      General: No scleral icterus. Right eye: No discharge. Left eye: No discharge. Extraocular Movements: Extraocular movements intact. Conjunctiva/sclera: Conjunctivae normal.   Neck:      Musculoskeletal: Normal range of motion. No neck rigidity. Cardiovascular:      Rate and Rhythm: Normal rate and regular rhythm. Pulses: Normal pulses. Heart sounds: Normal heart sounds. No murmur. No friction rub. No gallop. Pulmonary:      Effort: Pulmonary effort is normal. No respiratory distress. Breath sounds: Normal breath sounds. No stridor. No wheezing, rhonchi or rales. Abdominal:      General: Bowel sounds are normal. There is no distension. Palpations: Abdomen is soft. There is no mass. Tenderness: There is no abdominal tenderness. There is no guarding or rebound. Hernia: No hernia is present. Musculoskeletal:         General: Swelling, tenderness and signs of injury present. No deformity.       Right shoulder: Normal.      Left shoulder: Normal.      Right elbow: Normal.     Left elbow: Normal.      Right wrist: Normal.      Left wrist: Normal.      Right hip: Normal. before arrival.  She states she cannot bear weight. She appears well she is no distress vital signs are stable she does have good pulses good cap refill she has pain over the dorsal aspect of her right foot there is some slight bruising noted she has no sensory deficit she has no joint laxity anterior posterior drawer test negative she does have pain with inversion, eversion of right ankle x-rays performed of right foot right ankle negative she likely has a sprain, bruise will give her Ace wrap crutches anti-inflammatories advised rice discharged home given return precautions and follow-up information stable. I do not think she needs additional labs/imaging. CLINICAL IMPRESSION:  Final diagnoses:    Ankle pain, unspecified chronicity, unspecified laterality   Pain of foot, unspecified laterality       (Please note that portions of this note may have been completed with a voice recognition program. Efforts were made to edit the dictations but occasionally words aremis-transcribed.)    DISPOSITION REFERRAL (if applicable):  [facility] Emergency Department  [address] [facility] [address] [phone]    If symptoms worsen    [facility]  [address] [facility]  [address] (if applicable):  New Prescriptions    ACETAMINOPHEN (TYLENOL) 325 MG TABLET    Take 2 tablets by mouth every 6 hours as needed for Pain          [employer], [facility], DO  12/19/20 8896

## 2021-03-28 ENCOUNTER — HOSPITAL ENCOUNTER (EMERGENCY)
Age: 31
Discharge: HOME | End: 2021-03-28
Payer: COMMERCIAL

## 2021-03-28 VITALS
OXYGEN SATURATION: 97 % | BODY MASS INDEX: 20.77 KG/M2 | HEIGHT: 61 IN | SYSTOLIC BLOOD PRESSURE: 119 MMHG | TEMPERATURE: 98.8 F | RESPIRATION RATE: 18 BRPM | DIASTOLIC BLOOD PRESSURE: 85 MMHG | HEART RATE: 93 BPM | WEIGHT: 110 LBS

## 2021-03-28 DIAGNOSIS — K08.89 PAIN, DENTAL: ICD-10-CM

## 2021-03-28 DIAGNOSIS — S02.5XXA CLOSED FRACTURE OF TOOTH, INITIAL ENCOUNTER: Primary | ICD-10-CM

## 2021-03-28 PROCEDURE — 99284 EMERGENCY DEPT VISIT MOD MDM: CPT

## 2021-03-28 PROCEDURE — 6370000000 HC RX 637 (ALT 250 FOR IP): Performed by: PHYSICIAN ASSISTANT

## 2021-03-28 PROCEDURE — 2500000003 HC RX 250 WO HCPCS: Performed by: PHYSICIAN ASSISTANT

## 2021-03-28 RX ORDER — BUPIVACAINE HYDROCHLORIDE 5 MG/ML
5 INJECTION, SOLUTION EPIDURAL; INTRACAUDAL ONCE
Status: COMPLETED | OUTPATIENT
Start: 2021-03-28 | End: 2021-03-28

## 2021-03-28 RX ORDER — OXYCODONE HYDROCHLORIDE AND ACETAMINOPHEN 5; 325 MG/1; MG/1
1 TABLET ORAL ONCE
Status: COMPLETED | OUTPATIENT
Start: 2021-03-28 | End: 2021-03-28

## 2021-03-28 RX ORDER — LIDOCAINE HYDROCHLORIDE 20 MG/ML
5 INJECTION, SOLUTION INFILTRATION; PERINEURAL ONCE
Status: COMPLETED | OUTPATIENT
Start: 2021-03-28 | End: 2021-03-28

## 2021-03-28 RX ORDER — AMOXICILLIN 500 MG/1
500 CAPSULE ORAL 3 TIMES DAILY
Qty: 21 CAPSULE | Refills: 0 | Status: SHIPPED | OUTPATIENT
Start: 2021-03-28 | End: 2021-04-04

## 2021-03-28 RX ORDER — OXYCODONE HYDROCHLORIDE AND ACETAMINOPHEN 5; 325 MG/1; MG/1
1 TABLET ORAL EVERY 6 HOURS PRN
Qty: 8 TABLET | Refills: 0 | Status: SHIPPED | OUTPATIENT
Start: 2021-03-28 | End: 2021-03-30

## 2021-03-28 RX ADMIN — LIDOCAINE HYDROCHLORIDE 5 ML: 20 INJECTION, SOLUTION INFILTRATION; PERINEURAL at 19:37

## 2021-03-28 RX ADMIN — OXYCODONE HYDROCHLORIDE AND ACETAMINOPHEN 1 TABLET: 5; 325 TABLET ORAL at 19:43

## 2021-03-28 RX ADMIN — BUPIVACAINE HYDROCHLORIDE 25 MG: 5 INJECTION, SOLUTION EPIDURAL; INTRACAUDAL at 19:37

## 2021-03-28 ASSESSMENT — PAIN SCALES - GENERAL: PAINLEVEL_OUTOF10: 10

## 2021-03-28 NOTE — ED PROVIDER NOTES
eMERGENCY dEPARTMENT eNCOUnter      PCP: No primary care provider on file. CHIEF COMPLAINT    Chief Complaint   Patient presents with    Dental Pain       HPI    Jose Lopez is a 27 y.o. female who presents with right upper dental pain. Patient states that she has very poor dentition, has had many of her teeth extracted and has a partial denture. She states that she chipped part of her right upper tooth several days ago and has had worsening pain in this area and along gumline. Patient acutely worsened after drinking a milkshake. She denies any associated swelling, facial redness, fevers. REVIEW OF SYSTEMS    General: Denies Fever, Denies Chills, Denies syncope  ENT:  No tongue or lip swelling, No difficulty swallowing,   Respiratory: Denies difficulty breathing, wheezes. GI: Denies nausea, vomiting. Lymphatics:  No swollen nodes, red streaks  Skin:  See hpi. No rash. All other review of systems are negative  See HPI and nursing notes for additional information     PAST MEDICAL & SURGICAL HISTORY    Past Medical History:   Diagnosis Date    Anxiety     Anxiety attack     Asthma     No Pulmonologist At This Time    Bipolar disorder ([facility] [address]     Bronchitis Last Episode In 2007    Chronic back pain     Depression     Endometriosis     Genital herpes complicating pregnancy in antepartum condition 1/6/2015    High risk pregnancy due to maternal drug abuse ([facility] [address] 1/6/2015    HSV-2 (herpes simplex virus 2) infection Last Episode 4-16    IBS (irritable bowel syndrome)     Panic attacks     Pelvic pain in female     Pneumonia Last Episode In 1996 Or 1997    PTSD (post-traumatic stress disorder)     \"They Dx.  Me With PTSD After My Mom Passed Away When I Was 16\"    Restless leg     Supervision of other high-risk pregnancy(V23.89) 1/6/2015    Teeth missing     Upper And Lower    UTI (urinary tract infection) In Past    No Current Symptoms    Wears partial dentures     Upper     Past Surgical History:   Procedure Laterality Date    ABDOMINAL EXPLORATION SURGERY       SECTION  1-6-15     SECTION      DENTAL SURGERY      Teeth Extracted In Past    ENDOMETRIAL ABLATION  9-15,  Or     HYSTERECTOMY  2016    Total laparoscopic hysterectomy, BSO, cysto    HYSTERECTOMY, TOTAL ABDOMINAL         CURRENT MEDICATIONS    Current Outpatient Rx   Medication Sig Dispense Refill    oxyCODONE-acetaminophen (PERCOCET) 5-325 MG per tablet Take 1 tablet by mouth every 6 hours as needed for Pain for up to 2 days. Intended supply: 3 days.  Take lowest dose possible to manage pain 8 tablet 0    amoxicillin (AMOXIL) 500 MG capsule Take 1 capsule by mouth 3 times daily for 7 days 21 capsule 0    acetaminophen (TYLENOL) 325 MG tablet Take 2 tablets by mouth every 6 hours as needed for Pain 120 tablet 3       ALLERGIES    Allergies   Allergen Reactions    Latex Rash    Bactrim [Sulfamethoxazole-Trimethoprim] Nausea And Vomiting    Ciprofloxacin Nausea And Vomiting    Codeine Itching and Nausea And Vomiting    Toradol [Ketorolac Tromethamine] Itching, Nausea And Vomiting and Rash     \"Migraines\"    Tramadol Itching, Nausea And Vomiting and Rash     \"Migraines\"    Vicodin [Hydrocodone-Acetaminophen] Itching and Nausea And Vomiting     \"Anxiety Gets Really Bad\"    Vistaril [Hydroxyzine Hcl] Itching     \"I Pass Out\"    Bactrim [Sulfamethoxazole-Trimethoprim]     Ciprofloxacin     Codeine     Toradol [Ketorolac Tromethamine]     Tramadol     Vicodin [Hydrocodone-Acetaminophen]     Vistaril [Hydroxyzine Hcl]        SOCIAL & FAMILY HISTORY    Social History     Socioeconomic History    Marital status: Single     Spouse name: None    Number of children: None    Years of education: None    Highest education level: None   Occupational History    None   Social Needs    Financial resource strain: None    Food insecurity     Worry: None     Inability: None    Transportation needs     Medical: None     Non-medical: None   Tobacco Use    Smoking status: Current Every Day Smoker     Packs/day: 0.50     Years: 11.00     Pack years: 5.50     Types: Cigarettes    Smokeless tobacco: Never Used   Substance and Sexual Activity    Alcohol use: Not Currently     Comment: occ    Drug use: Yes     Types: Marijuana     Comment: \"Smoke Marijuana Maybe Once A Month\"    Sexual activity: Not Currently   Lifestyle    Physical activity     Days per week: None     Minutes per session: None    Stress: None   Relationships    Social connections     Talks on phone: None     Gets together: None     Attends Christian service: None     Active member of club or organization: None     Attends meetings of clubs or organizations: None     Relationship status: None    Intimate partner violence     Fear of current or ex partner: None     Emotionally abused: None     Physically abused: None     Forced sexual activity: None   Other Topics Concern    None   Social History Narrative    ** Merged History Encounter **          Family History   Problem Relation Age of Onset    Early Death Mother         45 Or 44    Arthritis Mother     High Blood Pressure Mother     Substance Abuse Father         \"Crackhead\"    Mental Illness Sister         \"Split Personality, Anxiety\"    High Blood Pressure Sister     Heart Disease Brother     Depression Sister     Mental Illness Sister         Anxiety, PTSD       PHYSICAL EXAM    VITAL SIGNS: /85   Pulse 93   Temp 98.8 °F (37.1 °C) (Oral)   Resp 18   Ht 5' 1\" (1.549 m)   Wt 110 lb (49.9 kg)   LMP 05/19/2016   SpO2 97%   BMI 20.78 kg/m²    Constitutional:  Well developed, well nourished, no acute distress   HENT:  Atraumatic, moist mucus membranes. EOMI. No proptosis. Ear canals and TMs clear bilateral.  There is no maxillary sinus tenderness to percussion or redness/warmth.   Neck/Lymphatics: supple, no JVD, no swollen nodes   Musculoskeletal:  No edema, no deformities  Integument:  Skin warm and dry, no petechiae   Neurologic:  Alert & oriented, no slurred speech  Psych: Pleasant affect, no hallucinations    Dental:   Right upper premolar with break in lingual aspect of tooth. Tenderness to palpation of the tooth and into gingiva. Majority of dentition is extracted. Gingival buccal interface without obvious mass or discoloration, or fluctuance to palpation. No sublingual swelling or masses   No submandibular swelling. No facial swelling or redness    Oropharynx:    Posterior pharynx without swelling, tonsillar hypertrophy. Uvula is midline and rises with phonation. No sublingual swelling.      ________________________________________________________________________    Procedure:  Dental Nerve Block    Indication:  Pain    Questions sought and answered, verbal consent obtained. Procedure:  Periodental block performed at site of dental pain with 2 cc 2% Lidocaine and 2 cc 0.5% Marcaine. Bygget 9 tolerated the procedure well, no acute complications. Pt tolerated procedure well and adequate relief of pain was obtained.    ________________________________________________________________________              ED COURSE & MEDICAL DECISION MAKING       Vital signs and nursing notes reviewed during ED course. I have independently evaluated this patient. Supervising MD present in the Emergency Department, available for consultation, throughout entirety of patient care. Patient presents as above. She is hemodynamically stable, afebrile, tachycardic, oxygenating well on room air. There are no signs of abscess or facial cellulitis on exam at this time. No signs of patient having difficulty with swallowing or handling oral secretions at this time. I recommend patient follow up with dentist soon as possible and patient agrees to do so. Dental block performed as above. Today I provided patient with a prescription for pain medication &  Amoxicillin. All pertinent Lab data and radiographic results reviewed with patient at bedside. The patient and/or the family were informed of the results of any tests/labs/imaging, the treatment plan, and time was allotted to answer questions. Diagnosis, disposition, and plan reviewed with patient today. Patient is comfortable with discharge at this time. Again, patient agrees to follow up with dentist as soon as possible. Return to emergency department warning signs discussed in detail with patient who understands and agrees to returning for any new or worsening signs or symptoms, including but not limited to difficulty swallowing, increased jaw swelling, fever, increased pain. Clinical  IMPRESSION    1. Closed fracture of tooth, initial encounter    2. Pain, dental              Comment: Please note this report has been produced using speech recognition software and may contain errors related to that system including errors in grammar, punctuation, and spelling, as well as words and phrases that may be inappropriate. If there are any questions or concerns please feel free to contact the dictating provider for clarification.           Mili Crespo [place]  03/28/21 1680

## 2021-04-02 ENCOUNTER — HOSPITAL ENCOUNTER (EMERGENCY)
Age: 31
Discharge: HOME | End: 2021-04-02
Payer: COMMERCIAL

## 2021-04-02 VITALS
BODY MASS INDEX: 20.77 KG/M2 | DIASTOLIC BLOOD PRESSURE: 84 MMHG | SYSTOLIC BLOOD PRESSURE: 108 MMHG | WEIGHT: 110 LBS | RESPIRATION RATE: 17 BRPM | HEART RATE: 86 BPM | TEMPERATURE: 98 F | HEIGHT: 61 IN | OXYGEN SATURATION: 96 %

## 2021-04-02 VITALS
OXYGEN SATURATION: 98 % | TEMPERATURE: 97.5 F | SYSTOLIC BLOOD PRESSURE: 129 MMHG | HEART RATE: 110 BPM | BODY MASS INDEX: 20.78 KG/M2 | RESPIRATION RATE: 18 BRPM | WEIGHT: 110 LBS | DIASTOLIC BLOOD PRESSURE: 84 MMHG

## 2021-04-02 DIAGNOSIS — K08.409 S/P TOOTH EXTRACTION: ICD-10-CM

## 2021-04-02 DIAGNOSIS — G89.18 ACUTE POST-OPERATIVE PAIN: Primary | ICD-10-CM

## 2021-04-02 DIAGNOSIS — K08.89 PAIN, DENTAL: Primary | ICD-10-CM

## 2021-04-02 PROCEDURE — 99283 EMERGENCY DEPT VISIT LOW MDM: CPT

## 2021-04-02 PROCEDURE — 64450 NJX AA&/STRD OTHER PN/BRANCH: CPT

## 2021-04-02 PROCEDURE — 6370000000 HC RX 637 (ALT 250 FOR IP): Performed by: PHYSICIAN ASSISTANT

## 2021-04-02 RX ORDER — LIDOCAINE HYDROCHLORIDE 20 MG/ML
SOLUTION OROPHARYNGEAL
Qty: 200 ML | Refills: 0 | Status: SHIPPED | OUTPATIENT
Start: 2021-04-02 | End: 2021-11-08

## 2021-04-02 RX ORDER — BUPIVACAINE HYDROCHLORIDE 5 MG/ML
10 INJECTION, SOLUTION EPIDURAL; INTRACAUDAL ONCE
Status: DISCONTINUED | OUTPATIENT
Start: 2021-04-02 | End: 2021-04-02 | Stop reason: HOSPADM

## 2021-04-02 RX ORDER — LIDOCAINE HYDROCHLORIDE 20 MG/ML
10 INJECTION, SOLUTION EPIDURAL; INFILTRATION; INTRACAUDAL; PERINEURAL ONCE
Status: DISCONTINUED | OUTPATIENT
Start: 2021-04-02 | End: 2021-04-02 | Stop reason: HOSPADM

## 2021-04-02 RX ORDER — OXYCODONE HYDROCHLORIDE AND ACETAMINOPHEN 5; 325 MG/1; MG/1
1 TABLET ORAL EVERY 8 HOURS PRN
Qty: 9 TABLET | Refills: 0 | Status: SHIPPED | OUTPATIENT
Start: 2021-04-02 | End: 2021-04-05

## 2021-04-02 RX ORDER — OXYCODONE HYDROCHLORIDE AND ACETAMINOPHEN 5; 325 MG/1; MG/1
1 TABLET ORAL ONCE
Status: COMPLETED | OUTPATIENT
Start: 2021-04-02 | End: 2021-04-02

## 2021-04-02 RX ORDER — LIDOCAINE HYDROCHLORIDE 20 MG/ML
15 SOLUTION OROPHARYNGEAL ONCE
Status: COMPLETED | OUTPATIENT
Start: 2021-04-02 | End: 2021-04-02

## 2021-04-02 RX ADMIN — LIDOCAINE HYDROCHLORIDE 15 ML: 20 SOLUTION ORAL; TOPICAL at 05:05

## 2021-04-02 RX ADMIN — OXYCODONE HYDROCHLORIDE AND ACETAMINOPHEN 1 TABLET: 5; 325 TABLET ORAL at 05:05

## 2021-04-02 ASSESSMENT — PAIN SCALES - GENERAL: PAINLEVEL_OUTOF10: 10

## 2021-04-02 ASSESSMENT — PAIN DESCRIPTION - LOCATION
LOCATION: TEETH
LOCATION: TEETH

## 2021-04-02 NOTE — ED PROVIDER NOTES
eMERGENCY dEPARTMENT eNCOUnter         [address] [facility]    PCP: No primary care provider on file. CHIEF COMPLAINT    Chief Complaint   Patient presents with    Dental Pain     reports had teeth pulled this morning and was not given narcotics for the pain       HPI    Phani Bryant is a 27 y.o. female who presents with right upper and lower dentition. Context is patient was seen at  for dental this morning and had 2 teeth surgically extracted. She was discharged home with ibuprofen. She states since the procedure, she has had constant 10/10 sharp burning stabbing pain in this area. Tried topical Orajel but states \"it only burned my gums. \"  States she tried calling her dentist for improved pain control however she was told they could only provide her ibuprofen. She does have multiple narcotic drug allergies. She states she has had some seeping nonpulsatile bleeding from both incision sites. She is not on any anticoagulation therapy and has no known underlying history of bleeding or clotting disorders. No fever chills, tongue or lip swelling, difficulty swallowing or breathing. States he came to the ED for assistance with pain control following her tooth extraction.     REVIEW OF SYSTEMS    General: Denies Fever, Denies Chills, Denies syncope  ENT:  No tongue or lip swelling, No difficulty swallowing,   Cardiac: Denies Chest Pain, palpitations  Respiratory: Denies cough and hemoptysis  GI: Denies vomiting or diarrhea    All other review of systems are negative  See HPI and nursing notes for additional information     PAST MEDICAL & SURGICAL HISTORY    Past Medical History:   Diagnosis Date    Anxiety     Anxiety attack     Asthma     No Pulmonologist At This Time    B[facility] disorder ([facility] [address]     Bronchitis Last Episode In 2007    Chronic back pain     Depression     Endometriosis     Genital herpes complicating pregnancy in antepartum condition 2015    High risk pregnancy due to maternal drug abuse ([facility] [address] 2015    HSV-2 (herpes simplex virus 2) infection Last Episode 4-16    IBS (irritable bowel syndrome)     Panic attacks     Pelvic pain in female     Pneumonia Last Episode In [address]    PTSD (post-traumatic stress disorder)     \"They Dx. Me With PTSD After My Mom Passed Away When I Was 16\"    Restless leg     Supervision of other high-risk pregnancy(V23.89) 2015    Teeth missing     Upper And Lower    UTI (urinary tract infection) In Past    No Current Symptoms    Wears partial dentures     Upper     Past Surgical History:   Procedure Laterality Date    ABDOMINAL EXPLORATION SURGERY       SECTION  1-6-15     SECTION      DENTAL SURGERY      Teeth Extracted In Past    ENDOMETRIAL ABLATION  9-15, 2000 Or     HYSTERECTOMY  2016    Total laparoscopic hysterectomy, BSO, cysto    HYSTERECTOMY, TOTAL ABDOMINAL         CURRENT MEDICATIONS    Current Outpatient Rx   Medication Sig Dispense Refill    lidocaine viscous hcl (XYLOCAINE) 2 % SOLN solution Soak cotton balls in 10cc of medication and apply directly to affected area. Leave in place for 10-15 minutes. Repeat as needed every 3-4 hours. Do not swallow cotton balls 200 mL 0    oxyCODONE-acetaminophen (PERCOCET) 5-325 MG per tablet Take 1 tablet by mouth every 8 hours as needed for Pain for up to 3 days.  9 tablet 0    amoxicillin (AMOXIL) 500 MG capsule Take 1 capsule by mouth 3 times daily for 7 days 21 capsule 0    acetaminophen (TYLENOL) 325 MG tablet Take 2 tablets by mouth every 6 hours as needed for Pain 120 tablet 3       ALLERGIES    Allergies   Allergen Reactions    Latex Rash    Bactrim [Sulfamethoxazole-Trimethoprim] Nausea And Vomiting    Ciprofloxacin Nausea And Vomiting    Codeine Itching and Nausea And Vomiting    Toradol [Ketorolac Tromethamine] Itching, Nausea And Vomiting and Rash     \"Migraines\"    Tramadol Itching, Nausea And Vomiting and Rash     \"Migraines\"    Vicodin [Hydrocodone-Acetaminophen] Itching and Nausea And Vomiting     \"Anxiety Gets Really Bad\"    Vistaril [Hydroxyzine Hcl] Itching     \"I Pass Out\"    Bactrim [Sulfamethoxazole-Trimethoprim]     Ciprofloxacin     Codeine     Toradol [Ketorolac Tromethamine]     Tramadol     Vicodin [Hydrocodone-Acetaminophen]     Vistaril [Hydroxyzine Hcl]        SOCIAL & FAMILY HISTORY    Social History     Socioeconomic History    Marital status: Single     Spouse name: Not on file    Number of children: Not on file    Years of education: Not on file    Highest education level: Not on file   Occupational History    Not on file   Social Needs    Financial resource strain: Not on file    Food insecurity     Worry: Not on file     Inability: Not on file    Transportation needs     Medical: Not on file     Non-medical: Not on file   Tobacco Use    Smoking status: Current Every Day Smoker     Packs/day: 0.50     Years: 11.00     Pack years: 5.50     Types: Cigarettes    Smokeless tobacco: Never Used   Substance and Sexual Activity    Alcohol use: Not Currently     Comment: occ    Drug use: Yes     Types: Marijuana     Comment: \"Smoke Marijuana Maybe Once A Month\"    Sexual activity: Not Currently   Lifestyle    Physical activity     Days per week: Not on file     Minutes per session: Not on file    Stress: Not on file   Relationships    Social connections     Talks on phone: Not on file     Gets together: Not on file     Attends [employer] service: Not on file     Active member of club or organization: Not on file     Attends meetings of clubs or organizations: Not on file     Relationship status: Not on file    Intimate partner violence     Fear of current or ex partner: Not on file     Emotionally abused: Not on file     Physically abused: Not on file     Forced sexual activity: Not on file   Other Topics Concern    Not on file   Social History Narrative ** Merged History Encounter **          Family History   Problem Relation Age of Onset    Early Death Mother         45 Or 44    Arthritis Mother     High Blood Pressure Mother     Substance Abuse Father         \"Crackhead\"    Mental Illness Sister         \"Split Personality, Anxiety\"    High Blood Pressure Sister     Heart Disease Brother     Depression Sister     Mental Illness Sister         Anxiety, PTSD       PHYSICAL EXAM    VITAL SIGNS: /84   Pulse 86   Temp 98 °F (36.7 °C) (Oral)   Resp 17   Ht 5' 1\" (1.549 m)   Wt 110 lb (49.9 kg)   LMP 05/19/2016   SpO2 96%   BMI 20.78 kg/m²    Constitutional:  Well developed, well nourished, tearful, nontoxic  HENT:  Atraumatic, Normocephalic. EOMIs. Nasal bones midline. No facial edema or redness. Moist mucus membranes  Neck/Lymphatics: supple, no JVD, no swollen nodes   Musculoskeletal:  No edema, no deformities  Integument:  Skin warm and dry, no petechiae   Neurologic:  Alert & oriented, no slurred speech  Psych: Pleasant affect, no hallucinations    Dental:   Overall, fair condition of the remaining upper and lower teeth. Recent tooth extraction of #4 and #29 teeth. Well-appearing surgical incision site. Minimal seeping blood from the #29 tooth extraction, no pulsatile bleeding. Gums are well-appearing without evidence of necrosis. Visible clot noted. There is tenderness along the gumline and areas of tooth extraction without crepitus or fluctuance. Gingival buccal interface without obvious mass or discoloration, or fluctuance to palpation. No sublingual swelling or masses   No submandibular swelling. Oropharynx:    Posterior pharynx without swelling, tonsillar hypertrophy. No sublingual swelling. ED COURSE & MEDICAL DECISION MAKING       Vital signs and nursing notes reviewed during ED course. I have independently evaluated this patient .   Supervising MD - Dr. Natasha Stark - present in the Emergency Department, available for consultation, throughout entirety of  patient care. All pertinent Lab data and radiographic results reviewed with patient at bedside. The patient and/or the family were informed of the results of any tests/labs/imaging, the treatment plan, and time was allotted to answer questions. Differential Diagnosis: dental pain, dental abscess, caries, peridontal disease, necrotic teeth, Ludwigs angina, bacteremia/spesis        Clinical  IMPRESSION    1. Acute post-operative pain    2. S/P tooth extraction      Patient presents with right upper and lower mouth pain following recent tooth extraction procedure this morning. On exam, tearful 80-year-old female, afebrile nontoxic, no acute respiratory distress. Tolerating oral secretions. No facial edema or swelling. Recent tooth extraction site noted in the right upper and lower mouth with visible clot in place. Very minimal seeping nonpulsatile bleeding from the right lower extraction site but gums are otherwise well-appearing without edema or evidence of necrosis. Trachea is midline. At this time, do not feel additional labs or imaging is emergently indicated. We discussed continuing her postop instructions for family dentistry and she will be provided very short course of Percocet for pain given her multiple drug allergies. Did discuss that she will need to follow-up with oral surgeon for continued pain control as needed. As surgery was just this morning, low clinical suspicion for dry socket as I am still able to visualize a clot in place. There are no palpable dental abscesses that require I&D at this time. No signs or symptoms of systemic involvement as presenting symptoms appear localized to the dentition.   I estimate there is low risk for low risk for deep space infection  (ie Javier's angina or retropharyngeal abscess/peritonsillar abscess) bacterial meningitis, airway compromise, bacteremia/sepsis thus I consider the discharge disposition reasonable. We have discussed the symptoms which are most concerning that necessitate immediate return. Patient is discharged in stable condition with prescription for Percocet. She is requesting viscous lidocaine as this is helped her pain in the past -  Patient is provided prescription for viscous lidocaine and educated on cotton ball soaks for direct application to the affected area. Patient is provided list today of local dental clinics and offices and encouraged to establish care/followup in 1-2 days. Patient does not have PCP so I have given him/her doctor of the day contact information and encourage him/her to establish care and followup in the next 1-2 days. In light of current events, I did utilize appropriate PPE (including N95 face mask, safety glasses, gloves as recommended by the health facility/national standard best practice, during my bedside interactions with the patient. Patient was masked throughout ED course. Full droplet precaution as well as full PPE was followed throughout patient's ED course and evaluation. Return to emergency Department warning signs discussed in detail with patient who understands and agrees, including but not limited to difficulty swallowing, increased jaw swelling, fever, increased pain, or any new symptoms. Comment: Please note this report has been produced using speech recognition software and may contain errors related to that system including errors in grammar, punctuation, and spelling, as well as words and phrases that may be inappropriate. If there are any questions or concerns please feel free to contact the dictating provider for clarification.         Mabel Bryant PA-C  04/03/21 1809

## 2021-04-02 NOTE — ED PROVIDER NOTES
eMERGENCY dEPARTMENT eNCOUnter      [address] [facility]    Chief Complaint   Patient presents with    Dental Pain       HPI    Stephanie Garcia is a 27 y.o. female who presents with dental pain. Patient has chronic dental pain, generally poor dentition. She states pain worsened several days ago while eating. Along the entire right upper/anterior mouth. Constant. Throbbing. Severity 10/10. She reports she called her previous dentist but isn't able to get in there so she plans to call [facility] at 7am when they open. She was here a few days ago and had a dental block and is requesting the same tonight. She is taking Amoxicillin. REVIEW OF SYSTEMS    Constitutional:  Denies fever. HENT:  Denies headache. Dentition:  + dental pain. Neck:  Denies neck pain. Respiratory:  Denies any shortness of breath. Cardiovascular:  Denies chest pain. GI:  Denies nausea or vomiting. Integument:  Denies skin changes. PAST MEDICAL & SURGICAL HISTORY    Past Medical History:   Diagnosis Date    Anxiety     Anxiety attack     Asthma     No Pulmonologist At This Time    Bipolar disorder ([facility] [address]     Bronchitis Last Episode In 2007    Chronic back pain     Depression     Endometriosis     Genital herpes complicating pregnancy in antepartum condition 1/6/2015    High risk pregnancy due to maternal drug abuse ([facility] [address] 1/6/2015    HSV-2 (herpes simplex virus 2) infection Last Episode 4-16    IBS (irritable bowel syndrome)     Panic attacks     Pelvic pain in female     Pneumonia Last Episode In 1996 Or 1997    PTSD (post-traumatic stress disorder)     \"They Dx.  Me With PTSD After My Mom Passed Away When I Was 16\"    Restless leg     Supervision of other high-risk pregnancy(V23.89) 1/6/2015    Teeth missing     Upper And Lower    UTI (urinary tract infection) In Past    No Current Symptoms    Wears partial dentures     Upper     Past Surgical History:   Procedure Laterality Date    ABDOMINAL EXPLORATION SURGERY       SECTION  --15     SECTION      DENTAL SURGERY      Teeth Extracted In Past    ENDOMETRIAL ABLATION  9-15,  Or     HYSTERECTOMY  2016    Total laparoscopic hysterectomy, BSO, cysto    HYSTERECTOMY, TOTAL ABDOMINAL         CURRENT MEDICATIONS    Current Outpatient Rx   Medication Sig Dispense Refill    amoxicillin (AMOXIL) 500 MG capsule Take 1 capsule by mouth 3 times daily for 7 days 21 capsule 0    acetaminophen (TYLENOL) 325 MG tablet Take 2 tablets by mouth every 6 hours as needed for Pain 120 tablet 3       ALLERGIES    Allergies   Allergen Reactions    Latex Rash    Bactrim [Sulfamethoxazole-Trimethoprim] Nausea And Vomiting    Ciprofloxacin Nausea And Vomiting    Codeine Itching and Nausea And Vomiting    Toradol [Ketorolac Tromethamine] Itching, Nausea And Vomiting and Rash     \"Migraines\"    Tramadol Itching, Nausea And Vomiting and Rash     \"Migraines\"    Vicodin [Hydrocodone-Acetaminophen] Itching and Nausea And Vomiting     \"Anxiety Gets Really Bad\"    Vistaril [Hydroxyzine Hcl] Itching     \"I Pass Out\"    Bactrim [Sulfamethoxazole-Trimethoprim]     Ciprofloxacin     Codeine     Toradol [Ketorolac Tromethamine]     Tramadol     Vicodin [Hydrocodone-Acetaminophen]     Vistaril [Hydroxyzine Hcl]        SOCIAL & FAMILY HISTORY    Social History     Socioeconomic History    Marital status: Single     Spouse name: None    Number of children: None    Years of education: None    Highest education level: None   Occupational History    None   Social Needs    Financial resource strain: None    Food insecurity     Worry: None     Inability: None    Transportation needs     Medical: None     Non-medical: None   Tobacco Use    Smoking status: Current Every Day Smoker     Packs/day: 0.50     Years: 11.00     Pack years: 5.50     Types: Cigarettes    Smokeless tobacco: Never Used   Substance and Sexual Activity    Alcohol use: performed at site of dental pain with approximately 2 cc 2% Lidocaine and 0.5% Marcaine (2:1 mix). Patient tolerated the procedure well, no acute complications. ED COURSE & MEDICAL DECISION MAKING    Pertinent Labs & Imaging studies reviewed and interpreted. (See chart for details)  -  Patient seen and evaluated in the emergency department. -  Triage and nursing notes reviewed and incorporated. -  Old chart records reviewed and incorporated. -  Supervising physician was Dr. Luis Armando. Patient was seen independently. -  Differential diagnosis includes: dental caries, dental abscess, Javier's Angina, retropharyngeal abscess, bacterial meningitis, airway obstruction, and others  -  Dental block performed. Patient given a dose of Percocet and viscous lidocaine while in the ED as well. -  Disposition:  Home. Follow-up with dentistry today as discussed. Return to the Emergency Department immediately if new or worsening symptoms occur. We have discussed the symptoms which are most concerning that necessitate immediate return. Patient agreeable with plan of care and disposition. In light of current events, I did utilize appropriate PPE (including N95 and surgical face mask, safety glasses, and gloves, as recommended by the health facility/national standard best practice, during my bedside interactions with the patient. FINAL IMPRESSION    1.  Pain, dental            (Please note that this note was completed with a voice recognition program.  Every attempt was made to edit the dictations, but inevitably there remain words that are mis-transcribed.)        Mariajose Chavez PA-C  04/02/21 1743

## 2021-04-05 ENCOUNTER — HOSPITAL ENCOUNTER (EMERGENCY)
Age: 31
Discharge: HOME | End: 2021-04-05
Payer: COMMERCIAL

## 2021-04-05 VITALS
OXYGEN SATURATION: 100 % | RESPIRATION RATE: 19 BRPM | DIASTOLIC BLOOD PRESSURE: 88 MMHG | WEIGHT: 115 LBS | SYSTOLIC BLOOD PRESSURE: 135 MMHG | BODY MASS INDEX: 21.71 KG/M2 | HEART RATE: 111 BPM | HEIGHT: 61 IN | TEMPERATURE: 98.6 F

## 2021-04-05 DIAGNOSIS — K08.89 PAIN, DENTAL: Primary | ICD-10-CM

## 2021-04-05 DIAGNOSIS — G89.18 POST-OP PAIN: ICD-10-CM

## 2021-04-05 PROCEDURE — 99282 EMERGENCY DEPT VISIT SF MDM: CPT

## 2021-04-05 ASSESSMENT — PAIN DESCRIPTION - LOCATION: LOCATION: TEETH

## 2021-04-05 ASSESSMENT — PAIN DESCRIPTION - PAIN TYPE: TYPE: ACUTE PAIN

## 2021-04-05 ASSESSMENT — PAIN SCALES - GENERAL: PAINLEVEL_OUTOF10: 10

## 2021-04-05 ASSESSMENT — PAIN DESCRIPTION - ORIENTATION: ORIENTATION: RIGHT;UPPER;LOWER

## 2021-04-05 NOTE — ED PROVIDER NOTES
Food insecurity     Worry: Not on file     Inability: Not on file    Transportation needs     Medical: Not on file     Non-medical: Not on file   Tobacco Use    Smoking status: Current Every Day Smoker     Packs/day: 0.50     Years: 11.00     Pack years: 5.50     Types: Cigarettes    Smokeless tobacco: Never Used   Substance and Sexual Activity    Alcohol use: Not Currently     Comment: occ    Drug use: Yes     Types: Marijuana     Comment: \"Smoke Marijuana Maybe Once A Month\"    Sexual activity: Not Currently   Lifestyle    Physical activity     Days per week: Not on file     Minutes per session: Not on file    Stress: Not on file   Relationships    Social connections     Talks on phone: Not on file     Gets together: Not on file     Attends Worship service: Not on file     Active member of club or organization: Not on file     Attends meetings of clubs or organizations: Not on file     Relationship status: Not on file    Intimate partner violence     Fear of current or ex partner: Not on file     Emotionally abused: Not on file     Physically abused: Not on file     Forced sexual activity: Not on file   Other Topics Concern    Not on file   Social History Narrative    ** Merged History Encounter **          Family History   Problem Relation Age of Onset    Early Death Mother         45 Or 44    Arthritis Mother     High Blood Pressure Mother     Substance Abuse Father         \"Crackhead\"    Mental Illness Sister         \"Split Personality, Anxiety\"    High Blood Pressure Sister     Heart Disease Brother     Depression Sister     Mental Illness Sister         Anxiety, PTSD         PHYSICAL EXAM    VITAL SIGNS: /88   Pulse 111   Temp 98.6 °F (37 °C)   Resp 19   Ht 5' 1\" (1.549 m)   Wt 115 lb (52.2 kg)   LMP 05/19/2016   SpO2 100%   BMI 21.73 kg/m²    Constitutional:  Well nourished, no acute distress   HENT:  NC/AT.   Ears, nose normal.  Oropharynx moist, uvula midline, no home.  I estimate there is LOW risk for DEEP SPACE INFECTION (e.g., KEMIS ANGINA OR RETROPHARYNGEAL ABSCESS), BACTERIAL MENINGITIS, or AIRWAY COMPROMISE, thus I consider the discharge disposition reasonable. Follow-up with dentist in 2-3 days. Return to the Emergency Department immediately if new or worsening symptoms occur. We have discussed the symptoms which are most concerning that necessitate immediate return, including worsening pain, difficulty swallowing, fever, chills, difficulty breathing, etc.  Patient agreeable with plan of care and disposition. FINAL IMPRESSION    1. Pain, dental    2. Post-op pain        Blood pressure 135/88, pulse 111, temperature 98.6 °F (37 °C), resp. rate 19, height 5' 1\" (1.549 m), weight 115 lb (52.2 kg), last menstrual period 05/19/2016, SpO2 100 %, not currently breastfeeding. (Please note that this note was completed with a voice recognition program.  Every attempt was made to edit the dictations, but inevitably there remain words that are mis-transcribed. Virgil Tsyon PA-C  04/05/21 2637

## 2021-04-05 NOTE — ED PROVIDER NOTES
As physician-in-triage, I performed a medical screening history and physical exam on this patient. HISTORY OF PRESENT ILLNESS  Ayah Castro is a 27 y.o. female presents with dental pain, she said she had a procedure recently and pain is so bad that she cannot stand it, they cannot get her until Friday. PHYSICAL EXAM  /88   Pulse 111   Temp 98.6 °F (37 °C)   Resp 19   Ht 5' 1\" (1.549 m)   Wt 115 lb (52.2 kg)   LMP 05/19/2016   SpO2 100%   BMI 21.73 kg/m²     On exam, the patient appears in no acute distress. Speech is clear. Breathing is unlabored. Moves all extremities    Comment: Please note this report has been produced using speech recognition software and may contain errors related to that system including errors in grammar, punctuation, and spelling, as well as words and phrases that may be inappropriate. If there are any questions or concerns please feel free to contact the dictating provider for clarification.         Aisha Miles MD  04/05/21 7690

## 2021-04-21 ENCOUNTER — TELEPHONE (OUTPATIENT)
Dept: FAMILY MEDICINE CLINIC | Age: 31
End: 2021-04-21

## 2021-04-21 NOTE — TELEPHONE ENCOUNTER
04/21/2021 Left message to reschedule missed appointment.   [facility] [place]

## 2021-05-26 ENCOUNTER — HOSPITAL ENCOUNTER (EMERGENCY)
Age: 31
Discharge: HOME | End: 2021-05-26
Payer: COMMERCIAL

## 2021-05-26 VITALS
OXYGEN SATURATION: 99 % | DIASTOLIC BLOOD PRESSURE: 87 MMHG | HEIGHT: 62 IN | WEIGHT: 113 LBS | SYSTOLIC BLOOD PRESSURE: 115 MMHG | RESPIRATION RATE: 16 BRPM | TEMPERATURE: 98.5 F | HEART RATE: 101 BPM | BODY MASS INDEX: 20.8 KG/M2

## 2021-05-26 DIAGNOSIS — J01.01 ACUTE RECURRENT MAXILLARY SINUSITIS: Primary | ICD-10-CM

## 2021-05-26 PROCEDURE — 6370000000 HC RX 637 (ALT 250 FOR IP): Performed by: PHYSICIAN ASSISTANT

## 2021-05-26 PROCEDURE — 99283 EMERGENCY DEPT VISIT LOW MDM: CPT

## 2021-05-26 RX ORDER — PSEUDOEPHEDRINE HYDROCHLORIDE 30 MG/1
60 TABLET ORAL ONCE
Status: COMPLETED | OUTPATIENT
Start: 2021-05-26 | End: 2021-05-26

## 2021-05-26 RX ORDER — AMOXICILLIN AND CLAVULANATE POTASSIUM 875; 125 MG/1; MG/1
1 TABLET, FILM COATED ORAL 2 TIMES DAILY
Qty: 20 TABLET | Refills: 0 | Status: SHIPPED | OUTPATIENT
Start: 2021-05-26 | End: 2021-06-05

## 2021-05-26 RX ORDER — AMOXICILLIN AND CLAVULANATE POTASSIUM 875; 125 MG/1; MG/1
1 TABLET, FILM COATED ORAL ONCE
Status: COMPLETED | OUTPATIENT
Start: 2021-05-26 | End: 2021-05-26

## 2021-05-26 RX ORDER — PSEUDOEPHEDRINE HYDROCHLORIDE 30 MG/1
30 TABLET ORAL EVERY 4 HOURS PRN
Qty: 20 TABLET | Refills: 0 | Status: SHIPPED | OUTPATIENT
Start: 2021-05-26 | End: 2021-06-02

## 2021-05-26 RX ADMIN — PSEUDOEPHEDRINE HCL 60 MG: 30 TABLET, FILM COATED ORAL at 14:35

## 2021-05-26 RX ADMIN — AMOXICILLIN AND CLAVULANATE POTASSIUM 1 TABLET: 875; 125 TABLET, FILM COATED ORAL at 14:28

## 2021-05-26 NOTE — ED NOTES
Patient educated on after visit care needs. Verbalized understanding and denies other needs. [address] 64  Hardeep Garcia  05/26/21 0883

## 2021-05-26 NOTE — ED PROVIDER NOTES
eMERGENCY dEPARTMENT eNCOUnter        [address] [facility]    Chief Complaint   Patient presents with    Nasal Congestion       HPI    Zackary Raymond is a 27 y.o. female who presents with nasal congestion. Onset was 3 weeks ago. Patient notes yellow discharge from bilateral nostrils, pressure over maxillary and frontal sinuses, and generalized HA. States tried nedi pot at home 2 weeks ago with some kind of chemical mixed into warm (not hot) water, this was painful and burning, now symptoms worse. No fevers. No sick contacts, daughter living in house is well. Thinks she might have black mold at home, has water leaking through holes in the roof, has not actually seen black mold, has inspectors coming soon. She has been on amoxicillin for the last 3 days secondary to a dental procedure and has not had any improvement. She notes that she has been frequently sticking Q-tips into her nose to try to \"clear out the snot\". REVIEW OF SYSTEMS    See HPI for further details. Review of systems otherwise negative. Constitutional:  No fever. HENT:  + headache, no earache, + nasal congestion, + sinus pressure, no sore throat  Respiratory:  no cough, no sob. Cardiovascular:  Denies chest pain. GI:  Denies nausea, vomiting, diarrhea. Musculoskeletal:  Denies edema, tenderness. Integument:  Denies rashes.     PAST MEDICAL HISTORY    Past Medical History:   Diagnosis Date    Anxiety     Anxiety attack     Asthma     No Pulmonologist At This Time    B[facility] disorder ([facility] [address]     Bronchitis Last Episode In 2007    Chronic back pain     Depression     Endometriosis     Genital herpes complicating pregnancy in antepartum condition 1/6/2015    High risk pregnancy due to maternal drug abuse ([facility] [address] 1/6/2015    HSV-2 (herpes simplex virus 2) infection Last Episode 4-16    IBS (irritable bowel syndrome)     Panic attacks     Pelvic pain in female     Pneumonia Last Episode In 1996 Or 1997    PTSD (post-traumatic stress disorder)     \"They Dx. Me With PTSD After My Mom Passed Away When I Was 16\"    Restless leg     Supervision of other high-risk pregnancy(V23.89) 2015    Teeth missing     Upper And Lower    UTI (urinary tract infection) In Past    No Current Symptoms    Wears partial dentures     Upper       SURGICAL HISTORY    Past Surgical History:   Procedure Laterality Date    ABDOMINAL EXPLORATION SURGERY       SECTION  1-6-15     SECTION      DENTAL SURGERY      Teeth Extracted In Past    ENDOMETRIAL ABLATION  9-15, 2000 Or     HYSTERECTOMY  2016    Total laparoscopic hysterectomy, BSO, cysto    HYSTERECTOMY, TOTAL ABDOMINAL         CURRENT MEDICATIONS    Current Outpatient Rx   Medication Sig Dispense Refill    amoxicillin-clavulanate (AUGMENTIN) 875-125 MG per tablet Take 1 tablet by mouth 2 times daily for 10 days 20 tablet 0    pseudoephedrine (DECONGESTANT) 30 MG tablet Take 1 tablet by mouth every 4 hours as needed for Congestion 20 tablet 0    lidocaine viscous hcl (XYLOCAINE) 2 % SOLN solution Soak cotton balls in 10cc of medication and apply directly to affected area. Leave in place for 10-15 minutes. Repeat as needed every 3-4 hours.  Do not swallow cotton balls 200 mL 0    acetaminophen (TYLENOL) 325 MG tablet Take 2 tablets by mouth every 6 hours as needed for Pain 120 tablet 3       ALLERGIES    Allergies   Allergen Reactions    Latex Rash    Bactrim [Sulfamethoxazole-Trimethoprim] Nausea And Vomiting    Ciprofloxacin Nausea And Vomiting    Codeine Itching and Nausea And Vomiting    Toradol [Ketorolac Tromethamine] Itching, Nausea And Vomiting and Rash     \"Migraines\"    Tramadol Itching, Nausea And Vomiting and Rash     \"Migraines\"    Vicodin [Hydrocodone-Acetaminophen] Itching and Nausea And Vomiting     \"Anxiety Gets Really Bad\"    Vistaril [Hydroxyzine Hcl] Itching     \"I Pass Out\"    Bactrim [Sulfamethoxazole-Trimethoprim]     Ciprofloxacin     Codeine     Toradol [Ketorolac Tromethamine]     Tramadol     Vicodin [Hydrocodone-Acetaminophen]     Vistaril [Hydroxyzine Hcl]        FAMILY HISTORY    Family History   Problem Relation Age of Onset    Early Death Mother         45 Or 44    Arthritis Mother     High Blood Pressure Mother     Substance Abuse Father         \"Crackhead\"    Mental Illness Sister         \"Split Personality, Anxiety\"    High Blood Pressure Sister     Heart Disease Brother     Depression Sister     Mental Illness Sister         Anxiety, PTSD       SOCIAL HISTORY    Social History     Socioeconomic History    Marital status: Single     Spouse name: None    Number of children: None    Years of education: None    Highest education level: None   Occupational History    None   Tobacco Use    Smoking status: Current Every Day Smoker     Packs/day: 0.50     Years: 11.00     Pack years: 5.50     Types: Cigarettes    Smokeless tobacco: Never Used   Vaping Use    Vaping Use: Never used   Substance and Sexual Activity    Alcohol use: Not Currently     Comment: occ    Drug use: Yes     Types: Marijuana     Comment: \"Smoke Marijuana Maybe Once A Month\"    Sexual activity: Not Currently   Other Topics Concern    None   Social History Narrative    ** Merged History Encounter **          Social Determinants of Health     Financial Resource Strain:     Difficulty of Paying Living Expenses:    Food Insecurity:     Worried About Running Out of Food in the Last Year:     Ran Out of Food in the Last Year:    Transportation Needs:     Lack of Transportation (Medical):      Lack of Transportation (Non-Medical):    Physical Activity:     Days of Exercise per Week:     Minutes of Exercise per Session:    Stress:     Feeling of Stress :    Social Connections:     Frequency of Communication with Friends and Family:     Frequency of Social Gatherings with Friends and Family:     Attends Oriental orthodox Services:     Active Member of Clubs or Organizations:     Attends Club or Organization Meetings:     Marital Status:    Intimate Partner Violence:     Fear of Current or Ex-Partner:     Emotionally Abused:     Physically Abused:     Sexually Abused:        PHYSICAL EXAM    VITAL SIGNS: /88   Pulse 100   Temp 99 °F (37.2 °C) (Oral)   Resp 16   Ht 5' 1.5\" (1.562 m)   Wt 113 lb (51.3 kg)   LMP 05/19/2016   SpO2 95%   BMI 21.01 kg/m²   GENERAL:  Well-developed, well-nourished, no acute distress  EYES:   PERRL, EOMI. Conjunctiva normal.  HENT:  NC/AT. External ears normal, canals patent and nonerythematous bilaterally, TMs intact and noninjected bilaterally. Nares patent, bilateral mucous membranes are pink and mildly injected directly around the nares opening, further up the nasal passages the linings appear to be a grayish-white, almost appear scarred, occluded with yellow discharge. No foreign bodies visualized. No sinus tenderness to palpation/percussion. Oropharynx moist and pink. No posterior pharyngeal erythema. no tonsillar edema, no exudates. Uvula midline. LUNGS:  Lungs CTAB, no rales or stridor or wheezing. CARDIAC:   RRR. No murmurs. ABDOMEN:  Abdomen soft, non-tender. Bowel sounds active. EXTREMITIES:   No edema. DP intact and symmetric. SKIN:   Warm and dry. NEURO:  Alert and oriented. No focal deficits. LYMPH:  No cervical lymphadenopathy. ED COURSE & MEDICAL DECISION MAKING    Pertinent Labs & Imaging studies reviewed. (See chart for details)  -  Patient seen and evaluated in the emergency department. -  Triage and nursing notes reviewed and incorporated. -  Old chart records reviewed and incorporated. -  I evaluated this patient independently  -  Differential diagnosis includes: upper respiratory infection, sinusitis, influenza, pneumonia, mononucleosis, and others  -  Work-up included:  -  Patient treated with Augmentin, Sudafed in the ED.  -  Patient discharged home.  Her nasal passages and mucous membrane lining appear abnormal but no clear foreign body noted. Her vital signs here are unremarkable. She appears well and in no obvious distress. I am unclear if this abnormal membrane lining is secondary to whatever chemical was used with her Malena pot, she states it came with the pot but she notes a burning pain when she used it, is emphatic that the water in pot was not hot enough to burn her. Has been inserting q-tips into nose to clean it out. Denies cocaine abuse or snorting anything. Will refer to ENT. Patient to follow-up with PCP and ENT. Return to the Emergency Department for new/worsening symptoms as needed. Patient agreeable with plan of care and disposition    FINAL IMPRESSION    1.  Acute recurrent maxillary sinusitis             Jennifer Tyson PA-C  05/26/21 3040

## 2021-10-22 ENCOUNTER — HOSPITAL ENCOUNTER (EMERGENCY)
Age: 31
Discharge: HOME | End: 2021-10-22
Payer: COMMERCIAL

## 2021-10-22 VITALS
DIASTOLIC BLOOD PRESSURE: 86 MMHG | TEMPERATURE: 98.1 F | OXYGEN SATURATION: 99 % | HEART RATE: 95 BPM | BODY MASS INDEX: 21.16 KG/M2 | WEIGHT: 115 LBS | SYSTOLIC BLOOD PRESSURE: 111 MMHG | HEIGHT: 62 IN | RESPIRATION RATE: 19 BRPM

## 2021-10-22 DIAGNOSIS — B00.9 HSV (HERPES SIMPLEX VIRUS) INFECTION: Primary | ICD-10-CM

## 2021-10-22 PROCEDURE — 99283 EMERGENCY DEPT VISIT LOW MDM: CPT

## 2021-10-22 RX ORDER — OXYCODONE HYDROCHLORIDE AND ACETAMINOPHEN 5; 325 MG/1; MG/1
1 TABLET ORAL EVERY 6 HOURS PRN
Qty: 12 TABLET | Refills: 0 | Status: SHIPPED | OUTPATIENT
Start: 2021-10-22 | End: 2021-10-25

## 2021-10-22 RX ORDER — VALACYCLOVIR HYDROCHLORIDE 1 G/1
1000 TABLET, FILM COATED ORAL 2 TIMES DAILY
Qty: 14 TABLET | Refills: 0 | Status: SHIPPED | OUTPATIENT
Start: 2021-10-22 | End: 2021-10-29

## 2021-10-22 RX ORDER — ACYCLOVIR 50 MG/G
OINTMENT TOPICAL
Qty: 15 G | Refills: 0 | Status: SHIPPED | OUTPATIENT
Start: 2021-10-22 | End: 2021-10-29

## 2021-10-22 ASSESSMENT — PAIN SCALES - GENERAL: PAINLEVEL_OUTOF10: 5

## 2021-10-22 ASSESSMENT — PAIN DESCRIPTION - LOCATION: LOCATION: GROIN

## 2021-10-22 NOTE — ED PROVIDER NOTES
eMERGENCY dEPARTMENT eNCOUnter      PCP: No primary care provider on file. CHIEF COMPLAINT    Chief Complaint   Patient presents with    Rash     herpes flare up        HPI    Haroldo Guerrero is a 32 y.o. female who presents with \"HSV flareup \". Patient states since losing her fiancé this past summer she has been having more frequent flareups. She states current symptoms are similar to previous including vesicles in  region. Denies urinary symptoms or vaginal symptoms. She is status post total hysterectomy. No associated fevers or other associated symptoms. She endorses significant pain with walking, sitting. No oral lesions. Pt currently does not have an immunocompromised condition      REVIEW OF SYSTEMS    General: Denies fevers or malaise  ENT: Denies throat swelling or tongue swelling. Denies eye pain or redness, changes in vision   Denies ear pain or rash in ear canal or auricle  Heart:  No chest pain. Pulmonary: Denies wheezes, difficulty breathing,  or chest tightness  GI:  No abdominal pain, vomiting, or diarrhea. :  See HPI. Neuro:   Denies HA. Denies numbness, tingling, weakness. Denies facial paralysis or difficulty smiling / chewing.   Skin: See HPI    All other review of systems are negative  See HPI and nursing notes for additional information     PAST MEDICAL & SURGICAL HISTORY    Past Medical History:   Diagnosis Date    Anxiety     Anxiety attack     Asthma     No Pulmonologist At This Time    Bipolar disorder ([facility] [address]     Bronchitis Last Episode In 2007    Chronic back pain     Depression     Endometriosis     Genital herpes complicating pregnancy in antepartum condition 1/6/2015    High risk pregnancy due to maternal drug abuse ([address] 1/6/2015    HSV-2 (herpes simplex virus 2) infection Last Episode 4-16    IBS (irritable bowel syndrome)     Panic attacks     Pelvic pain in female     Pneumonia Last Episode In [address]    PTSD (post-traumatic stress disorder)     Vijaya.Antonio Dx. Me With PTSD After My Mom Passed Away When I Was 16\"    Restless leg     Supervision of other high-risk pregnancy(V23.89) 2015    Teeth missing     Upper And Lower    UTI (urinary tract infection) In Past    No Current Symptoms    Wears partial dentures     Upper     Past Surgical History:   Procedure Laterality Date    ABDOMINAL EXPLORATION SURGERY       SECTION  1-6-15     SECTION      DENTAL SURGERY      Teeth Extracted In Past    ENDOMETRIAL ABLATION  9-15,  Or     HYSTERECTOMY  2016    Total laparoscopic hysterectomy, BSO, cysto    HYSTERECTOMY, TOTAL ABDOMINAL         CURRENT MEDICATIONS    Current Outpatient Rx   Medication Sig Dispense Refill    valACYclovir (VALTREX) 1 g tablet Take 1 tablet by mouth 2 times daily for 7 days 14 tablet 0    acyclovir (ZOVIRAX) 5 % ointment Apply topically BID 15 g 0    oxyCODONE-acetaminophen (PERCOCET) 5-325 MG per tablet Take 1 tablet by mouth every 6 hours as needed for Pain for up to 3 days. Intended supply: 3 days. Take lowest dose possible to manage pain 12 tablet 0    lidocaine viscous hcl (XYLOCAINE) 2 % SOLN solution Soak cotton balls in 10cc of medication and apply directly to affected area. Leave in place for 10-15 minutes. Repeat as needed every 3-4 hours.  Do not swallow cotton balls 200 mL 0    acetaminophen (TYLENOL) 325 MG tablet Take 2 tablets by mouth every 6 hours as needed for Pain 120 tablet 3       ALLERGIES    Allergies   Allergen Reactions    Latex Rash    Bactrim [Sulfamethoxazole-Trimethoprim] Nausea And Vomiting    Ciprofloxacin Nausea And Vomiting    Codeine Itching and Nausea And Vomiting    Toradol [Ketorolac Tromethamine] Itching, Nausea And Vomiting and Rash     \"Migraines\"    Tramadol Itching, Nausea And Vomiting and Rash     \"Migraines\"    Vicodin [Hydrocodone-Acetaminophen] Itching and Nausea And Vomiting     \"Anxiety Gets Really Bad\"    Vistaril [Hydroxyzine Hcl] Itching     \"I Pass Out\"    Bactrim [Sulfamethoxazole-Trimethoprim]     Ciprofloxacin     Codeine     Toradol [Ketorolac Tromethamine]     Tramadol     Vicodin [Hydrocodone-Acetaminophen]     Vistaril [Hydroxyzine Hcl]        SOCIAL & FAMILY HISTORY    Social History     Socioeconomic History    Marital status: Single     Spouse name: None    Number of children: None    Years of education: None    Highest education level: None   Occupational History    None   Tobacco Use    Smoking status: Current Every Day Smoker     Packs/day: 0.50     Years: 11.00     Pack years: 5.50     Types: Cigarettes    Smokeless tobacco: Never Used   Vaping Use    Vaping Use: Never used   Substance and Sexual Activity    Alcohol use: Not Currently     Comment: occ    Drug use: Yes     Types: Marijuana     Comment: \"Smoke Marijuana Maybe Once A Month\"    Sexual activity: Not Currently   Other Topics Concern    None   Social History Narrative    ** Merged History Encounter **          Social Determinants of Health     Financial Resource Strain:     Difficulty of Paying Living Expenses:    Food Insecurity:     Worried About Running Out of Food in the Last Year:     Ran Out of Food in the Last Year:    Transportation Needs:     Lack of Transportation (Medical):      Lack of Transportation (Non-Medical):    Physical Activity:     Days of Exercise per Week:     Minutes of Exercise per Session:    Stress:     Feeling of Stress :    Social Connections:     Frequency of Communication with Friends and Family:     Frequency of Social Gatherings with Friends and Family:     Attends [employer] Services:     Active Member of Clubs or Organizations:     Attends Club or Organization Meetings:     Marital Status:    Intimate Partner Violence:     Fear of Current or Ex-Partner:     Emotionally Abused:     Physically Abused:     Sexually Abused:      Family History   Problem Relation Age of Onset    Early Death Mother 45 Or 44    Arthritis Mother     High Blood Pressure Mother     Substance Abuse Father         \"Crackhead\"    Mental Illness Sister         \"Split Personality, Anxiety\"    High Blood Pressure Sister     Heart Disease Brother     Depression Sister     Mental Illness Sister         Anxiety, PTSD         PHYSICAL EXAM    VITAL SIGNS: /86   Pulse 95   Temp 98.1 °F (36.7 °C) (Oral)   Resp 19   Ht 5' 1.5\" (1.562 m)   Wt 115 lb (52.2 kg)   LMP 05/19/2016   SpO2 99%   BMI 21.38 kg/m²   Constitutional:  Well developed, well nourished  HENT:  Atraumatic, no facial or lip swelling. ORAL EXAM:   No tongue swelling, airway patent  Neck/Lymphatics: supple, no JVD, no swollen nodes  Respiratory:  Nonlabored breathing. Lungs clear. Cardiovascular:  No JVD   Musculoskeletal:  No edema, no acute deformities. Neurologic:    - Alert & oriented person, place, time, and situation, no speech difficulties or slurring.  - No obvious gross motor deficits  - Cranial nerves 2-12 grossly intact   -no facial paralysis   - Negative meningeal signs.  - Sensation intact to light touch  - Strength 5/5 in upper and lower extremities bilaterally  - Gait steady and without difficulty    Integument:    Defers  exam        ED COURSE & MEDICAL DECISION MAKING        Patient presents as above with concern for HSV flare. Will discharge with valacyclovir and short course of Percocet for pain control. Patient also requesting topical Zovirax ointment as this has helped relieve pain in the past as well. She is given primary care/gynecology follow-up for further evaluation and treatment is to return here with any new or worsening symptoms.   Return to emergency Department warning signs discussed in detail with patient today who understands and agrees including but not limited to rash in new location (especially on face or ears), worsening rash,fever, mouth/tongue/lip swelling, trouble breathing or swallowing, or any new symptoms not present today. The patient and/or the family were informed of the results of any tests/labs/imaging, the treatment plan, and time was allotted to answer questions. Clinical  IMPRESSION    1. HSV (herpes simplex virus) infection            Comment: Please note this report has been produced using speech recognition software and may contain errors related to that system including errors in grammar, punctuation, and spelling, as well as words and phrases that may be inappropriate. If there are any questions or concerns please feel free to contact the dictating provider for clarification.         SUDHAKAR Lopez Ala  10/22/21 0746

## 2021-10-22 NOTE — ED NOTES
Discharge instructions reviewed with pt. All questions answered at this time. Pt verbalized understanding.       Wenceslao Gomez RN  10/22/21 4524

## 2021-10-31 NOTE — ED NOTES
Discharge instructions reviewed with pt. All questions answered at this time. Pt verbalized understanding.       Narendra Downey RN  10/18/20 2018 Patient c/o LLE swelling in addition to the leg weeping.

## 2021-11-08 ENCOUNTER — APPOINTMENT (OUTPATIENT)
Dept: GENERAL RADIOLOGY | Age: 31
End: 2021-11-08
Payer: COMMERCIAL

## 2021-11-08 ENCOUNTER — HOSPITAL ENCOUNTER (EMERGENCY)
Age: 31
Discharge: HOME | End: 2021-11-08
Payer: COMMERCIAL

## 2021-11-08 VITALS
RESPIRATION RATE: 15 BRPM | DIASTOLIC BLOOD PRESSURE: 96 MMHG | TEMPERATURE: 98 F | SYSTOLIC BLOOD PRESSURE: 127 MMHG | OXYGEN SATURATION: 100 % | HEART RATE: 115 BPM

## 2021-11-08 DIAGNOSIS — M54.2 NECK PAIN ON RIGHT SIDE: ICD-10-CM

## 2021-11-08 DIAGNOSIS — M25.511 ACUTE PAIN OF RIGHT SHOULDER: Primary | ICD-10-CM

## 2021-11-08 PROCEDURE — 73030 X-RAY EXAM OF SHOULDER: CPT

## 2021-11-08 PROCEDURE — 99283 EMERGENCY DEPT VISIT LOW MDM: CPT

## 2021-11-08 RX ORDER — PREDNISONE 20 MG/1
60 TABLET ORAL ONCE
Status: DISCONTINUED | OUTPATIENT
Start: 2021-11-08 | End: 2021-11-08 | Stop reason: HOSPADM

## 2021-11-08 RX ORDER — METHOCARBAMOL 750 MG/1
750 TABLET, FILM COATED ORAL 4 TIMES DAILY
Qty: 20 TABLET | Refills: 0 | Status: SHIPPED | OUTPATIENT
Start: 2021-11-08 | End: 2021-11-13

## 2021-11-08 RX ORDER — PREDNISONE 20 MG/1
40 TABLET ORAL DAILY
Qty: 14 TABLET | Refills: 0 | Status: SHIPPED | OUTPATIENT
Start: 2021-11-08 | End: 2021-11-15

## 2021-11-08 RX ORDER — LIDOCAINE 50 MG/G
1 PATCH TOPICAL DAILY
Qty: 5 PATCH | Refills: 0 | Status: SHIPPED | OUTPATIENT
Start: 2021-11-08 | End: 2021-11-13

## 2021-11-08 ASSESSMENT — PAIN DESCRIPTION - LOCATION: LOCATION: SHOULDER

## 2021-11-08 ASSESSMENT — PAIN SCALES - GENERAL: PAINLEVEL_OUTOF10: 8

## 2021-11-08 ASSESSMENT — ENCOUNTER SYMPTOMS
SHORTNESS OF BREATH: 0
DIARRHEA: 0
BACK PAIN: 0
RHINORRHEA: 0
NAUSEA: 0
ABDOMINAL PAIN: 0
COUGH: 0
EYE PAIN: 0
VOMITING: 0

## 2021-11-08 ASSESSMENT — PAIN DESCRIPTION - ORIENTATION: ORIENTATION: RIGHT
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
## 2021-11-08 NOTE — ED TRIAGE NOTES
This nurse called patient for medication to be administered. Registration also called patient for registration, no answer.

## 2021-12-08 ENCOUNTER — HOSPITAL ENCOUNTER (EMERGENCY)
Age: 31
Discharge: HOME | End: 2021-12-08
Attending: EMERGENCY MEDICINE
Payer: COMMERCIAL

## 2021-12-08 VITALS
HEART RATE: 91 BPM | RESPIRATION RATE: 15 BRPM | SYSTOLIC BLOOD PRESSURE: 133 MMHG | TEMPERATURE: 98.5 F | DIASTOLIC BLOOD PRESSURE: 77 MMHG | OXYGEN SATURATION: 100 %

## 2021-12-08 DIAGNOSIS — B00.9 HERPES SIMPLEX INFECTION: Primary | ICD-10-CM

## 2021-12-08 PROCEDURE — 99283 EMERGENCY DEPT VISIT LOW MDM: CPT

## 2021-12-08 RX ORDER — MELOXICAM 7.5 MG/1
15 TABLET ORAL DAILY
Qty: 30 TABLET | Refills: 0 | OUTPATIENT
Start: 2021-12-08 | End: 2022-03-17

## 2021-12-08 RX ORDER — ACYCLOVIR 50 MG/G
OINTMENT TOPICAL
Qty: 1 G | Refills: 1 | Status: SHIPPED | OUTPATIENT
Start: 2021-12-08 | End: 2021-12-15

## 2021-12-08 ASSESSMENT — PAIN SCALES - GENERAL
PAINLEVEL_OUTOF10: 7
PAINLEVEL_OUTOF10: 8

## 2021-12-08 ASSESSMENT — PAIN DESCRIPTION - LOCATION: LOCATION: LABIA

## 2021-12-08 ASSESSMENT — PAIN DESCRIPTION - PAIN TYPE: TYPE: ACUTE PAIN

## 2021-12-09 NOTE — ED PROVIDER NOTES
EMERGENCY DEPARTMENT ENCOUNTER      PCP: No primary care provider on file. CHIEF COMPLAINT    Chief Complaint   Patient presents with    Other     herpes outbreak         This patient was not evaluated by the attending physician. I have independently evaluated this patient . HPI    Micaela Vieira is a 32 y.o. female who presents to the emergency department today complaining of a sore to the right labial area. Has history of herpetic outbreaks. Is stating this is similar. No significant swelling or induration or drainage from the vaginal introitus. Does have several bumps to the right labial fold area    REVIEW OF SYSTEMS    General: No fevers or chills  : No dysuria or flank pain  GI: No vomiting or diarrhea. Pulmonary: No difficulty breathing or cough  Neurologic: No lightheadedness, loss of consciousness or syncope  Skin: No spontaneous bruises. Lymphatics: No swollen lymph nodes. All other review of systems are negative  See HPI and nursing notes for additional information       PAST MEDICAL & SURGICAL HISTORY    Past Medical History:   Diagnosis Date    Anxiety     Anxiety attack     Asthma     No Pulmonologist At This Time    Bipolar disorder ([facility] [address]     Bronchitis Last Episode In 2007    Chronic back pain     Depression     Endometriosis     Genital herpes complicating pregnancy in antepartum condition 1/6/2015    High risk pregnancy due to maternal drug abuse ([facility] [address] 1/6/2015    HSV-2 (herpes simplex virus 2) infection Last Episode 4-16    IBS (irritable bowel syndrome)     Panic attacks     Pelvic pain in female     Pneumonia Last Episode In 1996 Or 1997    PTSD (post-traumatic stress disorder)     \"They Dx.  Me With PTSD After My Mom Passed Away When I Was 16\"    Restless leg     Supervision of other high-risk pregnancy(V23.89) 1/6/2015    Teeth missing     Upper And Lower    UTI (urinary tract infection) In Past    No Current Symptoms    Wears partial dentures Upper     Past Surgical History:   Procedure Laterality Date    ABDOMINAL EXPLORATION SURGERY       SECTION  1-6-15     SECTION      DENTAL SURGERY      Teeth Extracted In Past    ENDOMETRIAL ABLATION  9-15, 2000 Or     HYSTERECTOMY  2016    Total laparoscopic hysterectomy, BSO, cysto    HYSTERECTOMY, TOTAL ABDOMINAL         CURRENT MEDICATIONS    Current Outpatient Rx   Medication Sig Dispense Refill    acyclovir (ZOVIRAX) 5 % ointment Apply topically 4 times daily for the next 5 days.  1 g 1    meloxicam (MOBIC) 7.5 MG tablet Take 2 tablets by mouth daily 30 tablet 0    acetaminophen (TYLENOL) 325 MG tablet Take 2 tablets by mouth every 6 hours as needed for Pain 120 tablet 3       ALLERGIES    Allergies   Allergen Reactions    Latex Rash    Bactrim [Sulfamethoxazole-Trimethoprim] Nausea And Vomiting    Ciprofloxacin Nausea And Vomiting    Codeine Itching and Nausea And Vomiting    Toradol [Ketorolac Tromethamine] Itching, Nausea And Vomiting and Rash     \"Migraines\"    Tramadol Itching, Nausea And Vomiting and Rash     \"Migraines\"    Vicodin [Hydrocodone-Acetaminophen] Itching and Nausea And Vomiting     \"Anxiety Gets Really Bad\"    Vistaril [Hydroxyzine Hcl] Itching     \"I Pass Out\"    Bactrim [Sulfamethoxazole-Trimethoprim]     Ciprofloxacin     Codeine     Toradol [Ketorolac Tromethamine]     Tramadol     Vicodin [Hydrocodone-Acetaminophen]     Vistaril [Hydroxyzine Hcl]        FAMILY AND SOCIAL HISTORY    Family History   Problem Relation Age of Onset    Early Death Mother         45 Or 44    Arthritis Mother     High Blood Pressure Mother     Substance Abuse Father         \"Crackhead\"    Mental Illness Sister         \"Split Personality, Anxiety\"    High Blood Pressure Sister     Heart Disease Brother     Depression Sister     Mental Illness Sister         Anxiety, PTSD     Social History     Socioeconomic History    Marital status: Single     Spouse name: None    Number of children: None    Years of education: None    Highest education level: None   Occupational History    None   Tobacco Use    Smoking status: Current Every Day Smoker     Packs/day: 0.50     Years: 11.00     Pack years: 5.50     Types: Cigarettes    Smokeless tobacco: Never Used   Vaping Use    Vaping Use: Never used   Substance and Sexual Activity    Alcohol use: Not Currently     Comment: occ    Drug use: Yes     Types: Marijuana Jeannette Jordi)     Comment: \"Smoke Marijuana Maybe Once A Month\"    Sexual activity: Not Currently   Other Topics Concern    None   Social History Narrative    ** Merged History Encounter **          Social Determinants of Health     Financial Resource Strain:     Difficulty of Paying Living Expenses: Not on file   Food Insecurity:     Worried About Running Out of Food in the Last Year: Not on file    Chanda of Food in the Last Year: Not on file   Transportation Needs:     Lack of Transportation (Medical): Not on file    Lack of Transportation (Non-Medical):  Not on file   Physical Activity:     Days of Exercise per Week: Not on file    Minutes of Exercise per Session: Not on file   Stress:     Feeling of Stress : Not on file   Social Connections:     Frequency of Communication with Friends and Family: Not on file    Frequency of Social Gatherings with Friends and Family: Not on file    Attends Jewish Services: Not on file    Active Member of [address] [place] [address] or Organizations: Not on file    Attends Club or Organization Meetings: Not on file    Marital Status: Not on file   Intimate Partner Violence:     Fear of Current or Ex-Partner: Not on file    Emotionally Abused: Not on file    Physically Abused: Not on file    Sexually Abused: Not on file   Housing Stability:     Unable to Pay for Housing in the Last Year: Not on file    Number of [place] in the Last Year: Not on file    Unstable Housing in the Last Year: Not on file       PHYSICAL EXAM VITAL SIGNS: /77   Pulse 91   Temp 98.5 °F (36.9 °C) (Oral)   Resp 15   LMP 05/19/2016   SpO2 100%    Constitutional:  Well-developed, well-nourished, appears comfortable  Eyes:  Non-icteric sclera, no conjunctival injection, Pink palpebral conjunctiva. HENT:  Atraumatic, external nose normal.   NECK: Supple, no JVD   Respiratory:  No respiratory distress, normal breath sounds   Cardiovascular:  Normal rate, no murmurs  GI:  Normoactive BS. Abdominal is soft, there is no tenderness, guarding. No rebound. No massess. : On external genital genitalia exam there erythematous ulcer to the right aspect of the labia, no open or draining. Integument:  Nondiaphoretic Skin, no obvious rashes  Neurologic: Awake and oriented, no slurred speech    ED COURSE & MEDICAL DECISION MAKING    Patient presents as above. Emergent etiologies considered. Patient seen and examined. History and physical exam findings are consistent with a reoccurring herpetic outbreak. Patient is on Valtrex 1000 mg. Does admit that topical Zovirax has helped in the past.  We will place that order. Will advise follow-up with OB/GYN secondary to the reoccurring nature of herpes simplex. I believe her operative could be secondary to recent steroid use. But she will otherwise be discharged home in stable condition    Vital signs and nursing notes reviewed during ED course. All pertinent Lab data and radiographic results reviewed with patient at bedside. The patient and/or the family were informed of the results of any tests/labs/imaging, the treatment plan, and time was allotted to answer questions. Patient agrees to return emergency department if symptoms worsen or any new symptoms develop. Clinical  IMPRESSION    1.  Herpes simplex infection                Comment: Please note this report has been produced using speech recognition software and may contain errors related to that system including errors in grammar, punctuation, and spelling, as well as words and phrases that may be inappropriate. If there are any questions or concerns please feel free to contact the dictating provider for clarification.       Mk Louis, PA  12/08/21 2023

## 2022-02-07 ENCOUNTER — HOSPITAL ENCOUNTER (EMERGENCY)
Age: 32
Discharge: HOME | End: 2022-02-08
Payer: COMMERCIAL

## 2022-02-07 VITALS
SYSTOLIC BLOOD PRESSURE: 126 MMHG | TEMPERATURE: 98.7 F | OXYGEN SATURATION: 98 % | RESPIRATION RATE: 16 BRPM | DIASTOLIC BLOOD PRESSURE: 94 MMHG | HEART RATE: 90 BPM

## 2022-02-07 DIAGNOSIS — T25.222A LEFT FOOT BURN, SECOND DEGREE, INITIAL ENCOUNTER: Primary | ICD-10-CM

## 2022-02-07 DIAGNOSIS — T25.032A: ICD-10-CM

## 2022-02-07 PROCEDURE — 90715 TDAP VACCINE 7 YRS/> IM: CPT | Performed by: PHYSICIAN ASSISTANT

## 2022-02-07 PROCEDURE — 90471 IMMUNIZATION ADMIN: CPT | Performed by: PHYSICIAN ASSISTANT

## 2022-02-07 PROCEDURE — 99283 EMERGENCY DEPT VISIT LOW MDM: CPT

## 2022-02-07 PROCEDURE — 6360000002 HC RX W HCPCS: Performed by: PHYSICIAN ASSISTANT

## 2022-02-07 RX ORDER — DIAPER,BRIEF,INFANT-TODD,DISP
EACH MISCELLANEOUS ONCE
Status: DISCONTINUED | OUTPATIENT
Start: 2022-02-07 | End: 2022-02-08 | Stop reason: HOSPADM

## 2022-02-07 RX ORDER — CEPHALEXIN 500 MG/1
500 CAPSULE ORAL 2 TIMES DAILY
Qty: 14 CAPSULE | Refills: 0 | Status: SHIPPED | OUTPATIENT
Start: 2022-02-07 | End: 2022-02-14

## 2022-02-07 RX ORDER — BACITRACIN, NEOMYCIN, POLYMYXIN B 400; 3.5; 5 [USP'U]/G; MG/G; [USP'U]/G
OINTMENT TOPICAL
Qty: 1 EACH | Refills: 0 | Status: SHIPPED | OUTPATIENT
Start: 2022-02-07

## 2022-02-07 RX ORDER — OXYCODONE HYDROCHLORIDE AND ACETAMINOPHEN 5; 325 MG/1; MG/1
1 TABLET ORAL EVERY 8 HOURS PRN
Qty: 9 TABLET | Refills: 0 | Status: SHIPPED | OUTPATIENT
Start: 2022-02-07 | End: 2022-02-10

## 2022-02-07 RX ADMIN — TETANUS TOXOID, REDUCED DIPHTHERIA TOXOID AND ACELLULAR PERTUSSIS VACCINE, ADSORBED 0.5 ML: 5; 2.5; 8; 8; 2.5 SUSPENSION INTRAMUSCULAR at 19:49

## 2022-02-07 ASSESSMENT — PAIN SCALES - GENERAL: PAINLEVEL_OUTOF10: 10
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
## 2022-03-17 ENCOUNTER — HOSPITAL ENCOUNTER (EMERGENCY)
Age: 32
Discharge: HOME | End: 2022-03-17
Payer: COMMERCIAL

## 2022-03-17 VITALS
OXYGEN SATURATION: 98 % | SYSTOLIC BLOOD PRESSURE: 120 MMHG | TEMPERATURE: 98.7 F | DIASTOLIC BLOOD PRESSURE: 92 MMHG | RESPIRATION RATE: 18 BRPM | HEART RATE: 95 BPM

## 2022-03-17 DIAGNOSIS — B00.9 HSV (HERPES SIMPLEX VIRUS) INFECTION: Primary | ICD-10-CM

## 2022-03-17 PROCEDURE — 99283 EMERGENCY DEPT VISIT LOW MDM: CPT

## 2022-03-17 RX ORDER — ACYCLOVIR 50 MG/G
CREAM TOPICAL
Qty: 1 EACH | Refills: 0 | Status: SHIPPED | OUTPATIENT
Start: 2022-03-17

## 2022-03-17 ASSESSMENT — PAIN SCALES - GENERAL: PAINLEVEL_OUTOF10: 8

## 2022-03-17 NOTE — ED NOTES
Patient discharge unable to be located by this RN, Emilie Valdez (paramedic), and Rhett DE LA FUENTE. Presumed to have left without receiving and signing paperwork.      Mel Tubbs RN  03/17/22 0065

## 2022-03-17 NOTE — ED PROVIDER NOTES
EMERGENCY DEPARTMENT ENCOUNTER      PCP: No primary care provider on file. CHIEF COMPLAINT    Chief Complaint   Patient presents with    Other     HSV outbreak, blisters           This patient was not evaluated by the attending physician. I have independently evaluated this patient. HPI    Luis Jamison is a 32 y.o. female who presents with concern for herpes outbreak left groin region. Onset 3 days ago. Patient states pain is increased today. Patient denies chest pain, shortness breath, abdominal pain, vomiting or diarrhea. Patient denies any urinary symptoms. Patient denies vaginal discharge. Patient denies pregnancy. REVIEW OF SYSTEMS    Constitutional: denies fever  Respiratory:  No cough or shortness of breath   Cardiovascular:  No chest pain  GI: No nausea, vomiting  Musculoskeletal:  See HPI   Skin:  See HPI      All other review of systems are negative  See HPI and nursing notes for additional information     PAST MEDICAL HISTORY/SURGICAL HISTORY     Past Medical History:   Diagnosis Date    Anxiety     Anxiety attack     Asthma     No Pulmonologist At This Time    Bipolar disorder ([facility] [address]     Bronchitis Last Episode In 2007    Chronic back pain     Depression     Endometriosis     Genital herpes complicating pregnancy in antepartum condition 1/6/2015    High risk pregnancy due to maternal drug abuse ([facility] [address] 1/6/2015    HSV-2 (herpes simplex virus 2) infection Last Episode 4-16    IBS (irritable bowel syndrome)     Panic attacks     Pelvic pain in female     Pneumonia Last Episode In [address] PTSD (post-traumatic stress disorder)     \"They Dx.  Me With PTSD After My Mom Passed Away When I Was 16\"    Restless leg     Supervision of other high-risk pregnancy(V23.89) 1/6/2015    Teeth missing     Upper And Lower    UTI (urinary tract infection) In Past    No Current Symptoms    Wears partial dentures     Upper     Past Surgical History:   Procedure Laterality Date    ABDOMINAL EXPLORATION SURGERY       SECTION  15     SECTION      DENTAL SURGERY      Teeth Extracted In Past    ENDOMETRIAL ABLATION  9-15,  Or     HYSTERECTOMY  2016    Total laparoscopic hysterectomy, BSO, cysto    HYSTERECTOMY, TOTAL ABDOMINAL         CURRENT MEDICATIONS    Current Outpatient Rx   Medication Sig Dispense Refill    neomycin-bacitracin-polymyxin (NEOSPORIN) 400-5-5000 ointment Apply topically 2 times daily.  1 each 0    meloxicam (MOBIC) 7.5 MG tablet Take 2 tablets by mouth daily 30 tablet 0    acetaminophen (TYLENOL) 325 MG tablet Take 2 tablets by mouth every 6 hours as needed for Pain 120 tablet 3       ALLERGIES    Allergies   Allergen Reactions    Latex Rash    Bactrim [Sulfamethoxazole-Trimethoprim] Nausea And Vomiting    Ciprofloxacin Nausea And Vomiting    Codeine Itching and Nausea And Vomiting    Toradol [Ketorolac Tromethamine] Itching, Nausea And Vomiting and Rash     \"Migraines\"    Tramadol Itching, Nausea And Vomiting and Rash     \"Migraines\"    Vicodin [Hydrocodone-Acetaminophen] Itching and Nausea And Vomiting     \"Anxiety Gets Really Bad\"    Vistaril [Hydroxyzine Hcl] Itching     \"I Pass Out\"    Bactrim [Sulfamethoxazole-Trimethoprim]     Ciprofloxacin     Codeine     Toradol [Ketorolac Tromethamine]     Tramadol     Vicodin [Hydrocodone-Acetaminophen]     Vistaril [Hydroxyzine Hcl]        FAMILY HISTORY/SOCIAL HISTORY    Family History   Problem Relation Age of Onset    Early Death Mother         45 Or 44    Arthritis Mother     High Blood Pressure Mother     Substance Abuse Father         \"Crackhead\"    Mental Illness Sister         \"Split Personality, Anxiety\"    High Blood Pressure Sister     Heart Disease Brother     Depression Sister     Mental Illness Sister         Anxiety, PTSD     Social History     Socioeconomic History    Marital status: Single     Spouse name: None    Number of children: None  Years of education: None    Highest education level: None   Occupational History    None   Tobacco Use    Smoking status: Current Every Day Smoker     Packs/day: 0.50     Years: 11.00     Pack years: 5.50     Types: Cigarettes    Smokeless tobacco: Never Used   Vaping Use    Vaping Use: Never used   Substance and Sexual Activity    Alcohol use: Not Currently     Comment: occ    Drug use: Yes     Types: Marijuana Rondi Baba)     Comment: \"Smoke Marijuana Maybe Once A Month\"    Sexual activity: Not Currently   Other Topics Concern    None   Social History Narrative    ** Merged History Encounter **          Social Determinants of Health     Financial Resource Strain:     Difficulty of Paying Living Expenses: Not on file   Food Insecurity:     Worried About Running Out of Food in the Last Year: Not on file    Chanda of Food in the Last Year: Not on file   Transportation Needs:     Lack of Transportation (Medical): Not on file    Lack of Transportation (Non-Medical):  Not on file   Physical Activity:     Days of Exercise per Week: Not on file    Minutes of Exercise per Session: Not on file   Stress:     Feeling of Stress : Not on file   Social Connections:     Frequency of Communication with Friends and Family: Not on file    Frequency of Social Gatherings with Friends and Family: Not on file    Attends Sabianist Services: Not on file    Active Member of [address] [place] [address] or Organizations: Not on file    Attends Club or Organization Meetings: Not on file    Marital Status: Not on file   Intimate Partner Violence:     Fear of Current or Ex-Partner: Not on file    Emotionally Abused: Not on file    Physically Abused: Not on file    Sexually Abused: Not on file   Housing Stability:     Unable to Pay for Housing in the Last Year: Not on file    Number of [place] in the Last Year: Not on file    Unstable Housing in the Last Year: Not on file       PHYSICAL EXAM    VITAL SIGNS: BP (!) 120/92   Pulse 95 Temp 98.7 °F (37.1 °C)   Resp 18   LMP 05/19/2016   SpO2 98%    Constitutional:  Well developed, Well nourished  HENT:  Atraumatic, Normocephalic, PERRL  Respiratory:  No retractions, lungs are clear   Cardiovascular: Normal rate and rhythm on exam  GI:  Soft, No tenderness   Musculoskeletal:  No acute bony deformity, no obvious joint effusion   Integument: Female nurse present during exam.  No obvious rash is present on exam.  No induration or fluctuance. No palp lymphadenopathy  Neurologic:  Alert & oriented, no slurred speech. ED COURSE & MEDICAL DECISION MAKING    Patient presents as above. Patient has history of herpes simplex and feels like she is having an outbreak. Patient states she has been on acyclovir ointment previously and that this helps. Recommend ibuprofen and Tylenol as needed pain. I recommend follow-up with gynecology or primary care provider 3 to 5 days for recheck. Clinical  IMPRESSION    Herpes simplex virus infection    Diagnosis and plan discussed in detail with patient who understands and agrees. Patient agrees to return emergency department if symptoms worsen or any new symptoms develop. Comment: Please note this report has been produced using speech recognition software and may contain errors related to that system including errors in grammar, punctuation, and spelling, as well as words and phrases that may be inappropriate. If there are any questions or concerns please feel free to contact the dictating provider for clarification.          Kraig Willis PA-C  03/17/22 2903

## 2022-03-19 ENCOUNTER — HOSPITAL ENCOUNTER (EMERGENCY)
Age: 32
Discharge: HOME | End: 2022-03-19
Attending: EMERGENCY MEDICINE
Payer: COMMERCIAL

## 2022-03-19 VITALS
TEMPERATURE: 97.5 F | BODY MASS INDEX: 23.98 KG/M2 | HEART RATE: 84 BPM | SYSTOLIC BLOOD PRESSURE: 110 MMHG | HEIGHT: 61 IN | DIASTOLIC BLOOD PRESSURE: 84 MMHG | RESPIRATION RATE: 16 BRPM | OXYGEN SATURATION: 99 % | WEIGHT: 127 LBS

## 2022-03-19 DIAGNOSIS — B00.9 HERPES SIMPLEX INFECTION: Primary | ICD-10-CM

## 2022-03-19 PROCEDURE — 99284 EMERGENCY DEPT VISIT MOD MDM: CPT

## 2022-03-19 PROCEDURE — 6370000000 HC RX 637 (ALT 250 FOR IP): Performed by: EMERGENCY MEDICINE

## 2022-03-19 RX ORDER — OXYCODONE HYDROCHLORIDE AND ACETAMINOPHEN 5; 325 MG/1; MG/1
1 TABLET ORAL ONCE
Status: COMPLETED | OUTPATIENT
Start: 2022-03-19 | End: 2022-03-19

## 2022-03-19 RX ORDER — LIDOCAINE HYDROCHLORIDE 20 MG/ML
JELLY TOPICAL 2 TIMES DAILY PRN
Qty: 10 ML | Refills: 0 | Status: SHIPPED | OUTPATIENT
Start: 2022-03-19 | End: 2022-10-17

## 2022-03-19 RX ORDER — CYCLOBENZAPRINE HCL 10 MG
10 TABLET ORAL NIGHTLY PRN
Qty: 10 TABLET | Refills: 0 | Status: SHIPPED | OUTPATIENT
Start: 2022-03-19 | End: 2022-03-29

## 2022-03-19 RX ORDER — ACYCLOVIR 400 MG/1
800 TABLET ORAL 3 TIMES DAILY
Qty: 12 TABLET | Refills: 0 | Status: SHIPPED | OUTPATIENT
Start: 2022-03-19 | End: 2022-03-21

## 2022-03-19 RX ORDER — ACETAMINOPHEN 325 MG/1
650 TABLET ORAL EVERY 6 HOURS PRN
Qty: 30 TABLET | Refills: 0 | Status: SHIPPED | OUTPATIENT
Start: 2022-03-19

## 2022-03-19 RX ADMIN — OXYCODONE HYDROCHLORIDE AND ACETAMINOPHEN 1 TABLET: 5; 325 TABLET ORAL at 20:07

## 2022-03-19 ASSESSMENT — PAIN SCALES - GENERAL: PAINLEVEL_OUTOF10: 6

## 2022-03-19 ASSESSMENT — PAIN DESCRIPTION - PAIN TYPE: TYPE: ACUTE PAIN

## 2022-03-19 NOTE — ED PROVIDER NOTES
I independently examined and evaluated Judy Holloway. In brief, patient has had recurrent herpes outbreak. Her insurance did not cover the topical acyclovir, she does have the oral acyclovir. Focused exam revealed patient resting in bed, appears appropriately situationally anxious, active, speaking full sentences. ED course: Patient presented with concern for continued pain at herpes outbreak. Her exam was completed by the PA, I am in discussions with her as she wished for an additional consultation. She is requesting 3 days of Percocet, I discussed with her that without broken bones, kidney stones, cancer pain or other extraneous circumstances we do not often prescribe narcotics but we will prescribe additional symptom trial as an outpatient. She will be treated with singular Percocet in the department to allow pain control tonight and referred to gynecology as she has had recurrent outbreaks, most likely triggered by the stress of losing her significant other. All diagnostic, treatment, and disposition decisions were made by myself in conjunction with the advanced practice provider. For all further details of the patient's emergency department visit, please see the advanced practice provider's documentation. Comment: Please note this report has been produced using speech recognition software and may contain errors related to that system including errors in grammar, punctuation, and spelling, as well as words and phrases that may be inappropriate. If there are any questions or concerns please feel free to contact the dictating provider for clarification.         Rusty Rivera MD  03/19/22 2024

## 2022-03-19 NOTE — ED NOTES
This RN attempted to discharge patient. Patient requesting to speaking with another physician. Maria Teresa, attending MD notified.      John Prather RN  03/19/22 [id number]

## 2022-03-19 NOTE — ED PROVIDER NOTES
EMERGENCY DEPARTMENT ENCOUNTER      PCP: No primary care provider on file. CHIEF COMPLAINT    Chief Complaint   Patient presents with    Wound Check     herpes outbreak,unable to afford medication     Patient staffed with supervising physician Dr. Ely Triana is a 32 y.o. female who presents with concern for herpes outbreak. Onset 5 days ago. Patient states she has lesion to left labial region. Patient states she was evaluated 2 days ago and prescribed acyclovir ointment, she was unable to fill this due to cost.  Patient states pain is worsened. No known alleviating factors. Patient denies chest pain, shortness breath, abdominal pain, vomiting, diarrhea or fevers. REVIEW OF SYSTEMS    Constitutional: denies fever  Respiratory:  No cough or shortness of breath   Cardiovascular:  No chest pain  GI: No nausea, vomiting  Musculoskeletal:  See HPI   Skin:  See HPI      All other review of systems are negative  See HPI and nursing notes for additional information     PAST MEDICAL HISTORY/SURGICAL HISTORY     Past Medical History:   Diagnosis Date    Anxiety     Anxiety attack     Asthma     No Pulmonologist At This Time    [facility] [address]     Bronchitis Last Episode In 2007    Chronic back pain     Depression     Endometriosis     Genital herpes complicating pregnancy in antepartum condition 1/6/2015    High risk pregnancy due to maternal drug abuse ([facility] [address] 1/6/2015    HSV-2 (herpes simplex virus 2) infection Last Episode 4-16    IBS (irritable bowel syndrome)     Panic attacks     Pelvic pain in female     Pneumonia Last Episode In [address] PTSD (post-traumatic stress disorder)     \"They Dx.  Me With PTSD After My Mom Passed Away When I Was 16\"    Restless leg     Supervision of other high-risk pregnancy(V23.89) 1/6/2015    Teeth missing     Upper And Lower    UTI (urinary tract infection) In Past    No Current Symptoms    Wears partial dentures Upper     Past Surgical History:   Procedure Laterality Date    ABDOMINAL EXPLORATION SURGERY       SECTION  -15     SECTION      DENTAL SURGERY      Teeth Extracted In Past    ENDOMETRIAL ABLATION  9-15, 2000 Or     HYSTERECTOMY  2016    Total laparoscopic hysterectomy, BSO, cysto    HYSTERECTOMY, TOTAL ABDOMINAL         CURRENT MEDICATIONS    Current Outpatient Rx   Medication Sig Dispense Refill    acyclovir (ZOVIRAX) 400 MG tablet Take 2 tablets by mouth 3 times daily for 2 days 12 tablet 0    acyclovir (ZOVIRAX) 5 % CREA Apply topically 5 times a day for 4 days. 1 each 0    neomycin-bacitracin-polymyxin (NEOSPORIN) 400-5-5000 ointment Apply topically 2 times daily.  1 each 0    acetaminophen (TYLENOL) 325 MG tablet Take 2 tablets by mouth every 6 hours as needed for Pain 120 tablet 3       ALLERGIES    Allergies   Allergen Reactions    Latex Rash    Bactrim [Sulfamethoxazole-Trimethoprim] Nausea And Vomiting    Ciprofloxacin Nausea And Vomiting    Codeine Itching and Nausea And Vomiting    Toradol [Ketorolac Tromethamine] Itching, Nausea And Vomiting and Rash     \"Migraines\"    Tramadol Itching, Nausea And Vomiting and Rash     \"Migraines\"    Vicodin [Hydrocodone-Acetaminophen] Itching and Nausea And Vomiting     \"Anxiety Gets Really Bad\"    Vistaril [Hydroxyzine Hcl] Itching     \"I Pass Out\"    Bactrim [Sulfamethoxazole-Trimethoprim]     Ciprofloxacin     Codeine     Toradol [Ketorolac Tromethamine]     Tramadol     Vicodin [Hydrocodone-Acetaminophen]     Vistaril [Hydroxyzine Hcl]        FAMILY HISTORY/SOCIAL HISTORY    Family History   Problem Relation Age of Onset    Early Death Mother         45 Or 44    Arthritis Mother     High Blood Pressure Mother     Substance Abuse Father         \"Crackhead\"    Mental Illness Sister         \"Split Personality, Anxiety\"    High Blood Pressure Sister     Heart Disease Brother     Depression Sister    Idaniafelipe Yessica Mental Illness Sister         Anxiety, PTSD     Social History     Socioeconomic History    Marital status: Single     Spouse name: None    Number of children: None    Years of education: None    Highest education level: None   Occupational History    None   Tobacco Use    Smoking status: Current Every Day Smoker     Packs/day: 0.50     Years: 11.00     Pack years: 5.50     Types: Cigarettes    Smokeless tobacco: Never Used   Vaping Use    Vaping Use: Never used   Substance and Sexual Activity    Alcohol use: Not Currently     Comment: occ    Drug use: Yes     Types: Marijuana Jeison Don)     Comment: \"Smoke Marijuana Maybe Once A Month\"    Sexual activity: Not Currently   Other Topics Concern    None   Social History Narrative    ** Merged History Encounter **          Social Determinants of Health     Financial Resource Strain:     Difficulty of Paying Living Expenses: Not on file   Food Insecurity:     Worried About Running Out of Food in the Last Year: Not on file    Chanda of Food in the Last Year: Not on file   Transportation Needs:     Lack of Transportation (Medical): Not on file    Lack of Transportation (Non-Medical):  Not on file   Physical Activity:     Days of Exercise per Week: Not on file    Minutes of Exercise per Session: Not on file   Stress:     Feeling of Stress : Not on file   Social Connections:     Frequency of Communication with Friends and Family: Not on file    Frequency of Social Gatherings with Friends and Family: Not on file    Attends Confucianist Services: Not on file    Active Member of Clubs or Organizations: Not on file    Attends Club or Organization Meetings: Not on file    Marital Status: Not on file   Intimate Partner Violence:     Fear of Current or Ex-Partner: Not on file    Emotionally Abused: Not on file    Physically Abused: Not on file    Sexually Abused: Not on file   Housing Stability:     Unable to Pay for Housing in the Last Year: Not on file    Number of Places Lived in the Last Year: Not on file    Unstable Housing in the Last Year: Not on file       PHYSICAL EXAM    VITAL SIGNS: /80   Pulse 80   Temp 97.5 °F (36.4 °C) (Oral)   Resp 16   Ht 5' 1\" (1.549 m)   Wt 127 lb (57.6 kg)   LMP 05/19/2016   SpO2 98%   BMI 24.00 kg/m²    Constitutional:  Well developed, Well nourished  HENT:  Atraumatic, Normocephalic, PERRL. Oropharynx is clear. Respiratory:  No retractions, lungs are clear   Cardiovascular:  Heart rate normal.  Normal rhythm  GI:  Soft, No tenderness   Musculoskeletal:  No acute bony deformity, no obvious joint effusion   Integument: Left labia with ulceration. No induration or fluctuance. No other vesicles appreciated. No palp lymphadenopathy. Female nurse present during exam  Neurologic:  Alert & oriented, no slurred speech. ED COURSE & MEDICAL DECISION MAKING    Patient presents as above. There is lesion noted today, unable to visualize a lesion 2 days ago. There is no induration or palpable fluctuance. I do not believe there is any deeper space infection. I recommend patient continue ibuprofen for pain. Patient provided prescription for acyclovir tablets as she was unable to fill prescription for ointment. I recommend follow-up with primary care provider or gynecology but can schedule appointment with elevation. She requested second opinion and was evaluated by Dr. Sandor Jimenez, see her note for additional details      Clinical  IMPRESSION    Herpes simplex infection    Diagnosis and plan discussed in detail with patient who understands and agrees. Patient agrees to return emergency department if symptoms worsen or any new symptoms develop. Comment: Please note this report has been produced using speech recognition software and may contain errors related to that system including errors in grammar, punctuation, and spelling, as well as words and phrases that may be inappropriate.  If there are any questions or concerns please feel free to contact the dictating provider for clarification.          [place] Nurse, KRYSTAL  03/19/22 [id number]

## 2022-06-06 ENCOUNTER — APPOINTMENT (OUTPATIENT)
Dept: CT IMAGING | Age: 32
End: 2022-06-06
Payer: COMMERCIAL

## 2022-06-06 ENCOUNTER — HOSPITAL ENCOUNTER (EMERGENCY)
Age: 32
Discharge: HOME | End: 2022-06-06
Payer: COMMERCIAL

## 2022-06-06 VITALS
SYSTOLIC BLOOD PRESSURE: 115 MMHG | BODY MASS INDEX: 23.98 KG/M2 | HEART RATE: 109 BPM | RESPIRATION RATE: 18 BRPM | DIASTOLIC BLOOD PRESSURE: 81 MMHG | OXYGEN SATURATION: 98 % | WEIGHT: 127 LBS | TEMPERATURE: 98.6 F | HEIGHT: 61 IN

## 2022-06-06 DIAGNOSIS — S22.31XA CLOSED FRACTURE OF ONE RIB OF RIGHT SIDE, INITIAL ENCOUNTER: Primary | ICD-10-CM

## 2022-06-06 PROCEDURE — 6370000000 HC RX 637 (ALT 250 FOR IP): Performed by: PHYSICIAN ASSISTANT

## 2022-06-06 PROCEDURE — 93005 ELECTROCARDIOGRAM TRACING: CPT | Performed by: PHYSICIAN ASSISTANT

## 2022-06-06 PROCEDURE — 71250 CT THORAX DX C-: CPT

## 2022-06-06 PROCEDURE — 94150 VITAL CAPACITY TEST: CPT

## 2022-06-06 PROCEDURE — 99284 EMERGENCY DEPT VISIT MOD MDM: CPT

## 2022-06-06 PROCEDURE — 94664 DEMO&/EVAL PT USE INHALER: CPT

## 2022-06-06 RX ORDER — PREDNISONE 20 MG/1
60 TABLET ORAL ONCE
Status: DISCONTINUED | OUTPATIENT
Start: 2022-06-06 | End: 2022-06-06

## 2022-06-06 RX ORDER — OXYCODONE HYDROCHLORIDE AND ACETAMINOPHEN 5; 325 MG/1; MG/1
1 TABLET ORAL ONCE
Status: COMPLETED | OUTPATIENT
Start: 2022-06-06 | End: 2022-06-06

## 2022-06-06 RX ORDER — OXYCODONE HYDROCHLORIDE AND ACETAMINOPHEN 5; 325 MG/1; MG/1
1 TABLET ORAL EVERY 6 HOURS PRN
Qty: 10 TABLET | Refills: 0 | Status: SHIPPED | OUTPATIENT
Start: 2022-06-06 | End: 2022-06-11

## 2022-06-06 RX ADMIN — OXYCODONE HYDROCHLORIDE AND ACETAMINOPHEN 1 TABLET: 5; 325 TABLET ORAL at 11:22

## 2022-06-06 ASSESSMENT — PAIN SCALES - GENERAL
PAINLEVEL_OUTOF10: 8
PAINLEVEL_OUTOF10: 7

## 2022-06-06 ASSESSMENT — PAIN - FUNCTIONAL ASSESSMENT: PAIN_FUNCTIONAL_ASSESSMENT: 0-10

## 2022-06-06 ASSESSMENT — PAIN DESCRIPTION - DESCRIPTORS: DESCRIPTORS: SHARP

## 2022-06-06 ASSESSMENT — PAIN DESCRIPTION - LOCATION: LOCATION: RIB CAGE

## 2022-06-06 ASSESSMENT — PAIN DESCRIPTION - ORIENTATION: ORIENTATION: RIGHT

## 2022-06-06 NOTE — LETTER
[facility] Emergency Department  [address] [place] [id number]  Phone: [phone]  Fax: [phone]               June 6, 2022    Patient: Neeraj Baker   YOB: 1990   Date of Visit: 6/6/2022       To Whom It May Concern:    Nenita Aguiar was seen and treated in our emergency department on 6/6/2022. She may return to work on 6/10/22.       Sincerely,               Signature:__________________________________

## 2022-06-06 NOTE — ED PROVIDER NOTES
12 lead EKG per my interpretation:  Sinus Tachycardia at 120  Axis is   Normal  QTc is  within an acceptable range  There is no specific T wave changes appreciated. There is no specific ST wave changes appreciated. No STEMI    Prior EKG to compare with was not available.     MD Angela Benedict MD  06/06/22 [id number]

## 2022-06-07 ENCOUNTER — HOSPITAL ENCOUNTER (EMERGENCY)
Age: 32
Discharge: HOME | End: 2022-06-07
Payer: COMMERCIAL

## 2022-06-07 ENCOUNTER — APPOINTMENT (OUTPATIENT)
Dept: GENERAL RADIOLOGY | Age: 32
End: 2022-06-07
Payer: COMMERCIAL

## 2022-06-07 VITALS
DIASTOLIC BLOOD PRESSURE: 81 MMHG | RESPIRATION RATE: 20 BRPM | BODY MASS INDEX: 23.98 KG/M2 | OXYGEN SATURATION: 99 % | TEMPERATURE: 98.3 F | HEART RATE: 99 BPM | WEIGHT: 127 LBS | HEIGHT: 61 IN | SYSTOLIC BLOOD PRESSURE: 131 MMHG

## 2022-06-07 DIAGNOSIS — S22.31XD CLOSED FRACTURE OF ONE RIB OF RIGHT SIDE WITH ROUTINE HEALING, SUBSEQUENT ENCOUNTER: ICD-10-CM

## 2022-06-07 DIAGNOSIS — Z53.21 ELOPED FROM EMERGENCY DEPARTMENT: Primary | ICD-10-CM

## 2022-06-07 LAB
EKG ATRIAL RATE: 120 BPM
EKG DIAGNOSIS: NORMAL
EKG P AXIS: 37 DEGREES
EKG P-R INTERVAL: 134 MS
EKG Q-T INTERVAL: 332 MS
EKG QRS DURATION: 74 MS
EKG QTC CALCULATION (BAZETT): 469 MS
EKG R AXIS: 85 DEGREES
EKG T AXIS: 2 DEGREES
EKG VENTRICULAR RATE: 120 BPM

## 2022-06-07 PROCEDURE — 93010 ELECTROCARDIOGRAM REPORT: CPT | Performed by: INTERNAL MEDICINE

## 2022-06-07 PROCEDURE — 99283 EMERGENCY DEPT VISIT LOW MDM: CPT

## 2022-06-07 RX ORDER — IBUPROFEN 600 MG/1
600 TABLET ORAL ONCE
Status: DISCONTINUED | OUTPATIENT
Start: 2022-06-07 | End: 2022-06-07 | Stop reason: HOSPADM

## 2022-06-07 RX ORDER — IBUPROFEN 600 MG/1
600 TABLET ORAL EVERY 6 HOURS PRN
Qty: 30 TABLET | Refills: 1 | Status: SHIPPED | OUTPATIENT
Start: 2022-06-07 | End: 2022-10-17

## 2022-06-07 RX ORDER — LIDOCAINE 50 MG/G
1 PATCH TOPICAL DAILY
Qty: 10 PATCH | Refills: 0 | Status: SHIPPED | OUTPATIENT
Start: 2022-06-07

## 2022-06-07 RX ORDER — LIDOCAINE 4 G/G
1 PATCH TOPICAL DAILY
Status: DISCONTINUED | OUTPATIENT
Start: 2022-06-07 | End: 2022-06-07 | Stop reason: HOSPADM

## 2022-06-07 ASSESSMENT — PAIN SCALES - GENERAL: PAINLEVEL_OUTOF10: 7

## 2022-06-07 ASSESSMENT — PAIN DESCRIPTION - PAIN TYPE: TYPE: ACUTE PAIN

## 2022-06-07 ASSESSMENT — PAIN DESCRIPTION - ORIENTATION: ORIENTATION: RIGHT

## 2022-06-07 ASSESSMENT — PAIN DESCRIPTION - LOCATION: LOCATION: FLANK;RIB CAGE

## 2022-06-07 ASSESSMENT — PAIN DESCRIPTION - FREQUENCY: FREQUENCY: CONTINUOUS

## 2022-06-07 ASSESSMENT — PAIN DESCRIPTION - DESCRIPTORS: DESCRIPTORS: STABBING
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
## 2022-06-07 NOTE — ACP (ADVANCE CARE PLANNING)
Patient does not have any ACP documents/Medical Power of . LSW notes hospital will follow [place]'s Next of Kin hierarchy in the following descending order for priority:    Guardian  Spouse  [de-identified] of adult Children  Parents  [de-identified] of adult Siblings  Nearest Relative not described above    Per [place]'s Next of Kin hierarchy: Patients' Brother/Sister will be Primary Healthcare Decision Maker.

## 2022-08-04 LAB
A/G RATIO: 2.2 (ref 1.2–2.2)
ALBUMIN SERPL-MCNC: 5.1 G/DL (ref 3.8–4.8)
ALP BLD-CCNC: 82 IU/L (ref 44–121)
ALT SERPL-CCNC: 12 IU/L (ref 0–32)
AST SERPL-CCNC: 19 IU/L (ref 0–40)
BASOPHILS ABSOLUTE: 0.1 X10E3/UL (ref 0–0.2)
BASOPHILS RELATIVE PERCENT: 1 %
BILIRUB SERPL-MCNC: 0.2 MG/DL (ref 0–1.2)
BUN / CREAT RATIO: 12 (ref 9–23)
BUN BLDV-MCNC: 8 MG/DL (ref 6–20)
CALCIUM SERPL-MCNC: 10.3 MG/DL (ref 8.7–10.2)
CHLORIDE BLD-SCNC: 99 MMOL/L (ref 96–106)
CO2: 24 MMOL/L (ref 20–29)
CREAT SERPL-MCNC: 0.68 MG/DL (ref 0.57–1)
EGFR (CKD-EPI): 119 ML/MIN/1.73
EOSINOPHILS ABSOLUTE: 0.2 X10E3/UL (ref 0–0.4)
EOSINOPHILS RELATIVE PERCENT: 2 %
GLOBULIN: 2.3 G/DL (ref 1.5–4.5)
GLUCOSE BLD-MCNC: 116 MG/DL (ref 65–99)
HCT VFR BLD CALC: 45.3 % (ref 34–46.6)
HEMOGLOBIN: 15.7 G/DL (ref 11.1–15.9)
IMMATURE GRANS (ABS): 0 X10E3/UL (ref 0–0.1)
IMMATURE GRANULOCYTES: 0 %
LYMPHOCYTES ABSOLUTE: 1.7 X10E3/UL (ref 0.7–3.1)
LYMPHOCYTES RELATIVE PERCENT: 18 %
MCH RBC QN AUTO: 31.3 PG (ref 26.6–33)
MCHC RBC AUTO-ENTMCNC: 34.7 G/DL (ref 31.5–35.7)
MCV RBC AUTO: 90 FL (ref 79–97)
MONOCYTES ABSOLUTE: 1 X10E3/UL (ref 0.1–0.9)
MONOCYTES RELATIVE PERCENT: 10 %
NEUTROPHILS ABSOLUTE: 6.9 X10E3/UL (ref 1.4–7)
PDW BLD-RTO: 13.7 % (ref 11.7–15.4)
PLATELET # BLD: 486 X10E3/UL (ref 150–450)
POTASSIUM SERPL-SCNC: 4.4 MMOL/L (ref 3.5–5.2)
RBC # BLD: 5.01 X10E6/UL (ref 3.77–5.28)
SEGMENTED NEUTROPHILS RELATIVE PERCENT: 69 %
SODIUM BLD-SCNC: 140 MMOL/L (ref 134–144)
TOTAL PROTEIN: 7.4 G/DL (ref 6–8.5)
TSH SERPL DL<=0.05 MIU/L-ACNC: 0.3 UIU/ML (ref 0.45–4.5)
WBC # BLD: 9.9 X10E3/UL (ref 3.4–10.8)

## 2022-10-17 ENCOUNTER — HOSPITAL ENCOUNTER (EMERGENCY)
Age: 32
Discharge: HOME | End: 2022-10-17
Payer: COMMERCIAL

## 2022-10-17 VITALS
RESPIRATION RATE: 16 BRPM | BODY MASS INDEX: 23.98 KG/M2 | TEMPERATURE: 98.4 F | HEART RATE: 89 BPM | OXYGEN SATURATION: 97 % | HEIGHT: 61 IN | WEIGHT: 127 LBS | SYSTOLIC BLOOD PRESSURE: 118 MMHG | DIASTOLIC BLOOD PRESSURE: 78 MMHG

## 2022-10-17 VITALS
HEART RATE: 87 BPM | TEMPERATURE: 98.4 F | DIASTOLIC BLOOD PRESSURE: 73 MMHG | RESPIRATION RATE: 16 BRPM | OXYGEN SATURATION: 97 % | SYSTOLIC BLOOD PRESSURE: 107 MMHG

## 2022-10-17 DIAGNOSIS — K08.89 PAIN, DENTAL: Primary | ICD-10-CM

## 2022-10-17 DIAGNOSIS — K04.7 DENTAL INFECTION: ICD-10-CM

## 2022-10-17 PROCEDURE — 99283 EMERGENCY DEPT VISIT LOW MDM: CPT

## 2022-10-17 PROCEDURE — 6370000000 HC RX 637 (ALT 250 FOR IP): Performed by: PHYSICIAN ASSISTANT

## 2022-10-17 PROCEDURE — 6370000000 HC RX 637 (ALT 250 FOR IP): Performed by: NURSE PRACTITIONER

## 2022-10-17 RX ORDER — IBUPROFEN 600 MG/1
600 TABLET ORAL EVERY 6 HOURS PRN
Qty: 30 TABLET | Refills: 0 | Status: SHIPPED | OUTPATIENT
Start: 2022-10-17

## 2022-10-17 RX ORDER — OXYCODONE HYDROCHLORIDE AND ACETAMINOPHEN 5; 325 MG/1; MG/1
1 TABLET ORAL ONCE
Status: COMPLETED | OUTPATIENT
Start: 2022-10-17 | End: 2022-10-17

## 2022-10-17 RX ORDER — OXYCODONE HYDROCHLORIDE AND ACETAMINOPHEN 5; 325 MG/1; MG/1
1 TABLET ORAL EVERY 6 HOURS PRN
Qty: 2 TABLET | Refills: 0 | Status: SHIPPED | OUTPATIENT
Start: 2022-10-17 | End: 2022-10-18

## 2022-10-17 RX ORDER — CLINDAMYCIN HYDROCHLORIDE 150 MG/1
450 CAPSULE ORAL 3 TIMES DAILY
Qty: 63 CAPSULE | Refills: 0 | Status: SHIPPED | OUTPATIENT
Start: 2022-10-17 | End: 2022-10-24

## 2022-10-17 RX ORDER — OXYCODONE HYDROCHLORIDE AND ACETAMINOPHEN 5; 325 MG/1; MG/1
1 TABLET ORAL
Status: COMPLETED | OUTPATIENT
Start: 2022-10-17 | End: 2022-10-17

## 2022-10-17 RX ORDER — ONDANSETRON 4 MG/1
4 TABLET, ORALLY DISINTEGRATING ORAL EVERY 8 HOURS PRN
Qty: 15 TABLET | Refills: 0 | Status: SHIPPED | OUTPATIENT
Start: 2022-10-17

## 2022-10-17 RX ADMIN — OXYCODONE AND ACETAMINOPHEN 1 TABLET: 5; 325 TABLET ORAL at 11:47

## 2022-10-17 RX ADMIN — OXYCODONE HYDROCHLORIDE AND ACETAMINOPHEN 1 TABLET: 5; 325 TABLET ORAL at 18:34

## 2022-10-17 ASSESSMENT — PAIN SCALES - GENERAL
PAINLEVEL_OUTOF10: 10
PAINLEVEL_OUTOF10: 7
PAINLEVEL_OUTOF10: 8

## 2022-10-17 ASSESSMENT — PAIN DESCRIPTION - PAIN TYPE: TYPE: ACUTE PAIN

## 2022-10-17 ASSESSMENT — PAIN DESCRIPTION - DESCRIPTORS: DESCRIPTORS: SHARP

## 2022-10-17 ASSESSMENT — PAIN DESCRIPTION - FREQUENCY: FREQUENCY: CONTINUOUS

## 2022-10-17 ASSESSMENT — PAIN DESCRIPTION - ORIENTATION: ORIENTATION: LEFT

## 2022-10-17 NOTE — DISCHARGE INSTRUCTIONS
Follow up with the dentist Thursday as scheduled. Take antibiotics as prescribed. You can alternate tylenol/ibuprofen for pain. You were given a small amount of pain medication. You need to follow up with the dentist if additional pain medication is needed. Call again tomorrow to see if they have cancellation.

## 2022-10-17 NOTE — ED NOTES
Patient requesting a pain pill before discharge since she isn't driving and a new script for ibuprofen. [place], [place] notified.       Breezy Smith RN  10/17/22 5573

## 2022-10-17 NOTE — ED NOTES
Patient discharged to home at this time. Discharge instructions and follow up care discussed, patient voices understanding.       CHET Eugene  10/17/22 0981

## 2022-10-17 NOTE — ED PROVIDER NOTES
[address] ENCOUNTER        Pt Name: Lisa Olivarez  MRN: [id number]  [place] 1990  Date of evaluation: 10/17/2022  Provider: Gattis Brittle, APRN - CNP  PCP: No primary care provider on file. WILLIAM. I have evaluated this patient. My supervising physician was available for consultation. Triage CHIEF COMPLAINT       Chief Complaint   Patient presents with    Dental Pain         HISTORY OF PRESENT ILLNESS      Chief Complaint: Dental pain    Lisa Olivarez is a 28 y.o. female who presents for reevaluation of dental pain. Patient was seen here earlier today for the same. The pain has been going on for 3 days. She was discharged home with antibiotics as well as Magic mouthwash. She states she went home and fell asleep after receiving the pain medication here but when she woke up the pain was worse and not responding to ibuprofen. She denies any new fever, chills, facial swelling. She is here for pain management. Nursing Notes were all reviewed and agreed with or any disagreements were addressed in the HPI.     REVIEW OF SYSTEMS     Constitutional:   Denies fever, chills, weight loss or weakness   HENT:   Denies sore throat or ear pain   Cardiovascular:   Denies chest pain  Respiratory:  Denies cough or shortness of breath    GI:   Denies nausea, vomiting  Musculoskeletal:   Denies neck  Skin:   Denies rash  Neurologic:   Denies headache  PAST MEDICAL HISTORY     Past Medical History:   Diagnosis Date    Anxiety     Anxiety attack     Asthma     No Pulmonologist At This Time    Bipolar disorder ([facility] [address]     Bronchitis Last Episode In 2007    Chronic back pain     Depression     Endometriosis     Genital herpes complicating pregnancy in antepartum condition 1/6/2015    High risk pregnancy due to maternal drug abuse ([facility] [address] 1/6/2015    HSV-2 (herpes simplex virus 2) infection Last Episode 4-16    IBS (irritable bowel syndrome)     Panic attacks     Pelvic pain in female     Pneumonia Last Episode In [address]    PTSD (post-traumatic stress disorder)     \"They Dx. Me With PTSD After My Mom Passed Away When I Was 16\"    Restless leg     Supervision of other high-risk pregnancy(V23.89) 2015    Teeth missing     Upper And Lower    UTI (urinary tract infection) In Past    No Current Symptoms    Wears partial dentures     Upper       SURGICAL HISTORY     Past Surgical History:   Procedure Laterality Date    ABDOMINAL EXPLORATION SURGERY       SECTION  1-6-15     SECTION      DENTAL SURGERY      Teeth Extracted In Past    ENDOMETRIAL ABLATION  9-15,  Or     HYSTERECTOMY (CERVIX STATUS UNKNOWN)  2016    Total laparoscopic hysterectomy, BSO, cysto    HYSTERECTOMY, TOTAL ABDOMINAL (CERVIX REMOVED)         CURRENTMEDICATIONS       Discharge Medication List as of 10/17/2022  6:39 PM        CONTINUE these medications which have NOT CHANGED    Details   Magic Mouthwash (MIRACLE MOUTHWASH) Swish and spit 5 mLs 4 times daily as needed for Dental Pain Equal parts mixture benadryl, maalox, and lidocaine. , Disp-240 mL, R-0Normal      clindamycin (CLEOCIN) 150 MG capsule Take 3 capsules by mouth 3 times daily for 7 days, Disp-63 capsule, R-0Normal      ibuprofen (ADVIL;MOTRIN) 600 MG tablet Take 1 tablet by mouth every 6 hours as needed for Pain, Disp-30 tablet, R-0Normal      ondansetron (ZOFRAN ODT) 4 MG disintegrating tablet Take 1 tablet by mouth every 8 hours as needed for Nausea or Vomiting, Disp-15 tablet, R-0Normal      lidocaine (LIDODERM) 5 % Place 1 patch onto the skin daily May substitute for lidocaine 4% patch., Disp-10 patch, R-0Normal      !! acetaminophen (AMINOFEN) 325 MG tablet Take 2 tablets by mouth every 6 hours as needed for Pain, Disp-30 tablet, R-0Print      acyclovir (ZOVIRAX) 5 % CREA Apply topically 5 times a day for 4 days. , Disp-1 each, R-0Normal neomycin-bacitracin-polymyxin (NEOSPORIN) 400-5-5000 ointment Apply topically 2 times daily. , Disp-1 each, R-0, Normal      !! acetaminophen (TYLENOL) 325 MG tablet Take 2 tablets by mouth every 6 hours as needed for Pain, Disp-120 tablet, R-3Print       !! - Potential duplicate medications found. Please discuss with provider.           ALLERGIES     Latex, Bactrim [sulfamethoxazole-trimethoprim], Ciprofloxacin, Codeine, Toradol [ketorolac tromethamine], Tramadol, Vicodin [hydrocodone-acetaminophen], Vistaril [hydroxyzine hcl], Bactrim [sulfamethoxazole-trimethoprim], Ciprofloxacin, Codeine, Toradol [ketorolac tromethamine], Tramadol, Vicodin [hydrocodone-acetaminophen], and Vistaril [hydroxyzine hcl]    FAMILYHISTORY       Family History   Problem Relation Age of Onset    Early Death Mother         45 Or 44    Arthritis Mother     High Blood Pressure Mother     Substance Abuse Father         \"Crackhead\"    Mental Illness Sister         \"Split Personality, Anxiety\"    High Blood Pressure Sister     Heart Disease Brother     Depression Sister     Mental Illness Sister         Anxiety, PTSD        SOCIAL HISTORY       Social History     Socioeconomic History    Marital status: Single     Spouse name: None    Number of children: None    Years of education: None    Highest education level: None   Tobacco Use    Smoking status: Every Day     Packs/day: 0.50     Years: 11.00     Pack years: 5.50     Types: Cigarettes    Smokeless tobacco: Never   Vaping Use    Vaping Use: Never used   Substance and Sexual Activity    Alcohol use: Not Currently     Comment: occ    Drug use: Yes     Types: Marijuana Catina Tapia     Comment: \"Smoke Marijuana Maybe Once A Month\"    Sexual activity: Not Currently   Social History Narrative    ** Merged History Encounter **            SCREENINGS    Kevin Coma Scale  Eye Opening: Spontaneous  Best Verbal Response: Oriented  Best Motor Response: Obeys commands  Kevin Coma Scale Score: 15 PHYSICAL EXAM       ED Triage Vitals [10/17/22 1702]   BP Temp Temp Source Heart Rate Resp SpO2 Height Weight   118/78 98.4 °F (36.9 °C) Oral 89 16 97 % 5' 1\" (1.549 m) 127 lb (57.6 kg)      Constitutional:  Well developed, Well nourished. Modaerate distress, crying throughout examination  HENT:  Normocephalic, Atraumatic. Overall poor dentition. Multiple missing teeth. Upper lateral left incisor has mild edema around base of tooth with gingival erythema. No palpable abscess. No sublingual mass or edema. No trismus. No facial swelling. Neck/Lymphatics: supple, no JVD, no palpable submandibular or submental nodes  Cardiovascular:   RRR,  no murmurs/rubs/gallops. Respiratory:   Nonlabored breathing. Normal breath sounds, No wheezing  Integument:   Warm, Dry, No rashes. Neurologic:  Alert & oriented , No focal deficits noted  Psychiatric:  Affect normal, Mood normal.     DIAGNOSTIC RESULTS   LABS:    Labs Reviewed - No data to display    When ordered, only abnormal lab results are displayed. All other labs were within normal range or not returned as of this dictation. EKG: When ordered, EKG's are interpreted by the Emergency Department Physician in the absence of a cardiologist.  Please see their note for interpretation of EKG. RADIOLOGY:   Non-plain film images such as CT, Ultrasound and MRI are read by the radiologist. Plain radiographic images are visualized and preliminarily interpreted by the  ED Provider with the below findings:    Interpretation perthe Radiologist below, if available at the time of this note:    No orders to display     No results found.       PROCEDURES   Unless otherwise noted below, none         CRITICAL CARE   CRITICAL CARE NOTE:   N/A    CONSULTS:  None      EMERGENCY DEPARTMENT COURSE and MDM:   Vitals:    Vitals:    10/17/22 1702   BP: 118/78   Pulse: 89   Resp: 16   Temp: 98.4 °F (36.9 °C)   TempSrc: Oral   SpO2: 97%   Weight: 127 lb (57.6 kg)   Height: 5' 1\" (1.549 m)       Patient was given thefollowing medications:  Medications   oxyCODONE-acetaminophen (PERCOCET) 5-325 MG per tablet 1 tablet (1 tablet Oral Given 10/17/22 1834)         Is this patient to be included in the SEP-1 Core Measure due to severe sepsis or septic shock? No   Exclusion criteria - the patient is NOT to be included for SEP-1 Core Measure due to:  2+ SIRS criteria are not met    MDM:  Patient presents as above. Emergent etiologies considered. Patient seen and examined. Work-up initiated secondary to presentation, physical exam findings, vital signs and medical chart review. In brief, patient presented for pain management in the setting of recent diagnosed dental infection. There was no evidence of Catrachita's on today's exam.  Patient was given additional dose of Percocet here with a couple of tablets to take home. She was advised that she needs to follow-up with a dentist on Thursday as she is scheduled for continued evaluation and ensure she is taking her antibiotics. She did pick those up and take a dose after discharge earlier today. Advised to return for any worsening or new symptoms. CLINICAL IMPRESSION      1. Pain, dental    2. Dental infection          DISPOSITION/PLAN   DISPOSITION Decision To Discharge 10/17/2022 06:32:15 PM      PATIENT REFERREDTO:  Dentist      Follow up on 10/20/22 as scheduled. DISCHARGE MEDICATIONS:  Discharge Medication List as of 10/17/2022  6:39 PM        START taking these medications    Details   oxyCODONE-acetaminophen (PERCOCET) 5-325 MG per tablet Take 1 tablet by mouth every 6 hours as needed for Pain for up to 2 doses. Intended supply: 3 days.  Take lowest dose possible to manage pain, Disp-2 tablet, R-0Normal             DISCONTINUED MEDICATIONS:  Discharge Medication List as of 10/17/2022  6:39 PM        STOP taking these medications       meloxicam (MOBIC) 7.5 MG tablet Comments:   Reason for Stopping: (Please note that portions ofthis note were completed with a voice recognition program.  Efforts were made to edit the dictations but occasionally words are mis-transcribed.)    JASMIN Hollins CNP (electronically signed)             JASMIN Pfeiffer CNP  10/17/22 6894

## 2022-10-17 NOTE — ED PROVIDER NOTES
EMERGENCY DEPARTMENT ENCOUNTER      PCP: No primary care provider on file. CHIEF COMPLAINT    Chief Complaint   Patient presents with    Dental Pain         This patient was not evaluated by the attending physician. I have independently evaluated this patient. HPI    Giancarlo Serna is a 28 y.o. female who presents with left upper dental pain. Onset 3 days ago. Patient states she has history of poor dentition, does not currently have a dentist.  Patient has taken ibuprofen without significant improvement in pain. Patient denies chest pain, shortness of breath, difficulty swallowing, fever. REVIEW OF SYSTEMS    General: Denies Fever, Denies Chills, Denies syncope  ENT:  No tongue or lip swelling, No difficulty swallowing  Cardiac: Denies Chest Pain, palpitations  Respiratory: Denies difficulty breathing, wheezes. GI: Denies vomiting or diarrhea    All other review of systems are negative  See HPI and nursing notes for additional information     PAST MEDICAL & SURGICAL HISTORY    Past Medical History:   Diagnosis Date    Anxiety     Anxiety attack     Asthma     No Pulmonologist At This Time    Bipolar disorder ([facility] [address]     Bronchitis Last Episode In 2007    Chronic back pain     Depression     Endometriosis     Genital herpes complicating pregnancy in antepartum condition 1/6/2015    High risk pregnancy due to maternal drug abuse ([facility] [address] 1/6/2015    HSV-2 (herpes simplex virus 2) infection Last Episode 4-16    IBS (irritable bowel syndrome)     Panic attacks     Pelvic pain in female     Pneumonia Last Episode In [address]    PTSD (post-traumatic stress disorder)     \"They Dx.  Me With PTSD After My Mom Passed Away When I Was 16\"    Restless leg     Supervision of other high-risk pregnancy(V23.89) 1/6/2015    Teeth missing     Upper And Lower    UTI (urinary tract infection) In Past    No Current Symptoms    Wears partial dentures     Upper     Past Surgical History:   Procedure Laterality Date ABDOMINAL EXPLORATION SURGERY       SECTION  1-6-15     SECTION      DENTAL SURGERY      Teeth Extracted In Past    ENDOMETRIAL ABLATION  9-15,  Or     HYSTERECTOMY (CERVIX STATUS UNKNOWN)  2016    Total laparoscopic hysterectomy, BSO, cysto    HYSTERECTOMY, TOTAL ABDOMINAL (CERVIX REMOVED)         CURRENT MEDICATIONS    Current Outpatient Rx   Medication Sig Dispense Refill    Magic Mouthwash (MIRACLE MOUTHWASH) Swish and spit 5 mLs 4 times daily as needed for Dental Pain Equal parts mixture benadryl, maalox, and lidocaine. 240 mL 0    clindamycin (CLEOCIN) 150 MG capsule Take 3 capsules by mouth 3 times daily for 7 days 63 capsule 0    lidocaine (LIDODERM) 5 % Place 1 patch onto the skin daily May substitute for lidocaine 4% patch. 10 patch 0    ibuprofen (ADVIL;MOTRIN) 600 MG tablet Take 1 tablet by mouth every 6 hours as needed for Pain 30 tablet 1    acetaminophen (AMINOFEN) 325 MG tablet Take 2 tablets by mouth every 6 hours as needed for Pain 30 tablet 0    acyclovir (ZOVIRAX) 5 % CREA Apply topically 5 times a day for 4 days. 1 each 0    neomycin-bacitracin-polymyxin (NEOSPORIN) 400-5-5000 ointment Apply topically 2 times daily.  1 each 0    acetaminophen (TYLENOL) 325 MG tablet Take 2 tablets by mouth every 6 hours as needed for Pain 120 tablet 3       ALLERGIES    Allergies   Allergen Reactions    Latex Rash    Bactrim [Sulfamethoxazole-Trimethoprim] Nausea And Vomiting    Ciprofloxacin Nausea And Vomiting    Codeine Itching and Nausea And Vomiting    Toradol [Ketorolac Tromethamine] Itching, Nausea And Vomiting and Rash     \"Migraines\"    Tramadol Itching, Nausea And Vomiting and Rash     \"Migraines\"    Vicodin [Hydrocodone-Acetaminophen] Itching and Nausea And Vomiting     \"Anxiety Gets Really Bad\"    Vistaril [Hydroxyzine Hcl] Itching     \"I Pass Out\"    Bactrim [Sulfamethoxazole-Trimethoprim]     Ciprofloxacin     Codeine     Toradol [Ketorolac Tromethamine]     Tramadol Vicodin [Hydrocodone-Acetaminophen]     Vistaril [Hydroxyzine Hcl]        SOCIAL & FAMILY HISTORY    Social History     Socioeconomic History    Marital status: Single   Tobacco Use    Smoking status: Every Day     Packs/day: 0.50     Years: 11.00     Pack years: 5.50     Types: Cigarettes    Smokeless tobacco: Never   Vaping Use    Vaping Use: Never used   Substance and Sexual Activity    Alcohol use: Not Currently     Comment: occ    Drug use: Yes     Types: Marijuana [place] [employer])     Comment: \"Smoke Marijuana Maybe Once A Month\"    Sexual activity: Not Currently   Social History Narrative    ** Merged History Encounter **          Family History   Problem Relation Age of Onset    Early Death Mother         45 Or 44    Arthritis Mother     High Blood Pressure Mother     Substance Abuse Father         \"Crackhead\"    Mental Illness Sister         \"Split Personality, Anxiety\"    High Blood Pressure Sister     Heart Disease Brother     Depression Sister     Mental Illness Sister         Anxiety, PTSD       PHYSICAL EXAM    VITAL SIGNS: /73   Pulse 87   Temp 98.4 °F (36.9 °C)   Resp 16   LMP 05/19/2016   SpO2 97%    Constitutional:  Well developed, well nourished, no acute distress   HENT:  Atraumatic, moist mucus membranes. EOMI. No proptosis. Neck/Lymphatics: supple, no JVD, no swollen nodes   Musculoskeletal:  No edema, no deformities  Integument:  Skin warm and dry, no petechiae   Neurologic:  Alert & oriented, no slurred speech  Psych: Pleasant affect, no hallucinations  Dental:   Left  Upper lateral incisor with evidence of caries. Gingival buccal interface with erythema, no palpable fluctuance  No sublingual swelling or masses   No submandibular swelling. No facial swelling or redness    Oropharynx:    Posterior pharynx without swelling, tonsillar hypertrophy. No sublingual swelling. ED COURSE & MEDICAL DECISION MAKING      Patient presents as above. No palpable drainable dental abscess. No evidence for Catrachita's angina. Patient is well-appearing, nontoxic. Recommend she continue ibuprofen as needed for pain. Patient provided prescription for Magic mouthwash and clindamycin. Recommend follow-up with dentist as soon as possible for evaluation. Clinical  IMPRESSION    Acute Dental pain      Return to emergency Department warning signs discussed in detail with patient who understands and agrees, including but not limited to difficulty swallowing, increased jaw swelling, fever, increased pain, or any new symptoms. Comment: Please note this report has been produced using speech recognition software and may contain errors related to that system including errors in grammar, punctuation, and spelling, as well as words and phrases that may be inappropriate. If there are any questions or concerns please feel free to contact the dictating provider for clarification.              Jonas Mauricio PA-C  10/17/22 1548

## 2022-10-18 NOTE — ED PROVIDER NOTES
Last Episode In     Chronic back pain     Depression     Endometriosis     Genital herpes complicating pregnancy in antepartum condition 2015    High risk pregnancy due to maternal drug abuse ([facility] [address] 2015    HSV-2 (herpes simplex virus 2) infection Last Episode 4-16    IBS (irritable bowel syndrome)     Panic attacks     Pelvic pain in female     Pneumonia Last Episode In [address]    PTSD (post-traumatic stress disorder)     \"They Dx. Me With PTSD After My Mom Passed Away When I Was 16\"    Restless leg     Supervision of other high-risk pregnancy(V23.89) 2015    Teeth missing     Upper And Lower    UTI (urinary tract infection) In Past    No Current Symptoms    Wears partial dentures     Upper     Past Surgical History:   Procedure Laterality Date    ABDOMINAL EXPLORATION SURGERY       SECTION  1-6-15     SECTION      DENTAL SURGERY      Teeth Extracted In Past    ENDOMETRIAL ABLATION  9-15, 2000 [place]     HYSTERECTOMY  2016    Total laparoscopic hysterectomy, BSO, cysto    HYSTERECTOMY, TOTAL ABDOMINAL         CURRENT MEDICATIONS    Current Outpatient Rx   Medication Sig Dispense Refill    methocarbamol (ROBAXIN) 500 MG tablet Take 1 tablet by mouth 4 times daily As needed for muscle spasm. 20 tablet 0    ondansetron (ZOFRAN ODT) 4 MG disintegrating tablet Take 1 tablet by mouth every 6 hours 10 tablet 0    ibuprofen (ADVIL;MOTRIN) 400 MG tablet Take 1 tablet by mouth every 6 hours as needed for Pain 30 tablet 0    acetaminophen (AMINOFEN) 325 MG tablet Take 2 tablets by mouth every 6 hours as needed for Pain or Fever 60 tablet 0    albuterol sulfate HFA (PROVENTIL HFA) 108 (90 Base) MCG/ACT inhaler Inhale 2 puffs into the lungs every 4 hours as needed for Wheezing or Shortness of Breath With spacer (and mask if indicated). Thanks.  1 Inhaler 1    dicyclomine (BENTYL) 10 MG capsule Take 1 capsule by mouth 3 times daily As needed for abdominal pain Intimate partner violence:     Fear of current or ex partner: None     Emotionally abused: None     Physically abused: None     Forced sexual activity: None   Other Topics Concern    None   Social History Narrative    ** Merged History Encounter **          Family History   Problem Relation Age of Onset    Early Death Mother         45 Or 44    Arthritis Mother     High Blood Pressure Mother     Substance Abuse Father         \"Crackhead\"    Mental Illness Sister         \"Split Personality, Anxiety\"    High Blood Pressure Sister     Heart Disease Brother     Depression Sister     Mental Illness Sister         Anxiety, PTSD         PHYSICAL EXAM    VITAL SIGNS: /70   Pulse 97   Temp 98 °F (36.7 °C) (Oral)   Resp 15   Ht 5' 1\" (1.549 m)   Wt 112 lb (50.8 kg)   LMP  (Approximate)   SpO2 100%   BMI 21.16 kg/m²    Constitutional:  Well developed, well nourished, no acute distress   HENT:  Atraumatic. Mild tenderness to right superior lateral periorbital region. No palpable defect. No external signs of trauma. EOMI. PERRL. Moist mucus membranes. No clear fluid or blood from ears, eyes, nose, or mouth. Neck / Back:   Supple, no JVD,   No discoloration or swelling on inspection. No midline cervical tenderness. Mild right-sided paracervical tenderness and trapezius tenderness. Patient has full range of motion, although slow due to pain/stiffness. Thoracic and lumbar spine with no overlying erythema, ecchymosis, swelling. Mild tenderness to right paralumbar region. No midline tenderness. Cardiovascular / Chest:  Reg rate & rhythm, no murmurs/rubs/gallops. No chest wall tenderness. No flail segments or paradoxical chestwall movements. Respiratory:  Lungs Clear, no retractions. GI:  No discoloration. Soft, nontender, normal bowel sounds  Musculoskeletal: Right hip with area of ecchymosis to lateral hip. This region is tender to palpation. No obvious internal or external rotation. No obvious shortening. No knee tenderness. Distal pulses and sensation intact. Right shoulder with no overlying erythema, ecchymosis, swelling. Mild generalized tenderness to palpation. Range of motion intact. No elbow tenderness. Mild ecchymosis to proximal forearm. This region is tender to palpation. No distal wrist tenderness. No snuffbox tenderness. Distal sensation and pulses intact. Integument:  Skin intact, no discoloration. Neurologic:   Awake and alert. GCS 15. Cranial nerves 2-12 grossly intact. Strength 5/5 throughout. Light touch sensation intact throughout. Finger to nose WNL. DTR's 2+ in all 4 extremities. Psych: Pleasant, normal affect. RADIOLOGY/PROCEDURES    XR SHOULDER RIGHT (MIN 2 VIEWS)   Final Result   Unremarkable right shoulder. XR RADIUS ULNA RIGHT (2 VIEWS)   Final Result   No acute osseous abnormality. XR FEMUR RIGHT (MIN 2 VIEWS)   Final Result   No acute osseous abnormality. ED COURSE & MEDICAL DECISION MAKING      Presents as above. Patient denies head injury, states she does have pain to right superior lateral periorbital region. There is no palpable defects. Patient denies any headache. Currently have low suspicion of brain bleed or fracture. Patient would like to hold off on imaging of head at this time. Provided Norflex while in emergency department. Right shoulder, radius and ulna and femur x-ray showed no acute osseous abnormality. I discussed imaging results with patient today. Patient declines x-ray of pelvis. There is no midline spinal tenderness. I suspect patient's neck and back pain is likely muscular in nature. Patient is able to ambulate without difficulty and I have low suspicion of occult hip fracture. I recommend rest, ice, ibuprofen and Tylenol. Patient provided Robaxin. Recommend gentle range of motion stretching. Recommend follow-up with primary care in 2 to 3 days for recheck.     Clinical  IMPRESSION Secondary Defect Length In Cm (Required For Flaps): 0

## 2023-02-13 ENCOUNTER — HOSPITAL ENCOUNTER (EMERGENCY)
Age: 33
Discharge: HOME | End: 2023-02-13
Payer: COMMERCIAL

## 2023-02-13 VITALS
WEIGHT: 115 LBS | BODY MASS INDEX: 21.73 KG/M2 | TEMPERATURE: 97.9 F | SYSTOLIC BLOOD PRESSURE: 115 MMHG | DIASTOLIC BLOOD PRESSURE: 80 MMHG | RESPIRATION RATE: 18 BRPM | OXYGEN SATURATION: 100 % | HEART RATE: 100 BPM

## 2023-02-13 DIAGNOSIS — K06.8 PAIN OF GINGIVA: Primary | ICD-10-CM

## 2023-02-13 DIAGNOSIS — K08.89 TOOTHACHE: ICD-10-CM

## 2023-02-13 PROCEDURE — 99283 EMERGENCY DEPT VISIT LOW MDM: CPT

## 2023-02-13 RX ORDER — CLINDAMYCIN HYDROCHLORIDE 150 MG/1
450 CAPSULE ORAL 3 TIMES DAILY
Qty: 90 CAPSULE | Refills: 0 | Status: SHIPPED | OUTPATIENT
Start: 2023-02-13 | End: 2023-02-23

## 2023-02-13 NOTE — ED PROVIDER NOTES
**ADVANCED PRACTICE PROVIDER, I HAVE EVALUATED THIS PATIENT**        [address] ENCOUNTER      Pt Name: Sierra Gonzales  AAF:[id number]  [place] 1990  Date of evaluation: 2/13/2023  Provider: Wilfredo Khalil PA-C      Chief Complaint:    Chief Complaint   Patient presents with    Dental Pain         Nursing Notes, Past Medical Hx, Past Surgical Hx, Social Hx, Allergies, and Family Hx were all reviewed and agreed with or any disagreements were addressed in the HPI.    HPI: (Location, Duration, Timing, Severity, Quality, Assoc Sx, Context, Modifying Factors)     History from : Patient    Limitations to history : None    Sierra Gonzales is a 28 y.o. female who presents c/o dental pain x 2days ago along left upper gingiva and lower incisors. Yesterday was eating toast and pasta and worsened pain. Yesterday had felt \"knot\" long left gingiva and buccal surface. Had noticed weird taste in her mouth three days initially intermittent, now constant. But otherwise denies any drainage. Feels left cheek is more swollen. Had extractions years ago and occasionally would get a tooth pain which resolves after taking left antibiotics. No relief with 800mg ibuprofen tid. Denies URI symptoms, ear pain, fever,  difficulty swallowing.     PastMedical/Surgical History:      Diagnosis Date    Anxiety     Anxiety attack     Asthma     No Pulmonologist At This Time    Bipolar disorder ([facility] [address]     Bronchitis Last Episode In 2007    Chronic back pain     Depression     Endometriosis     Genital herpes complicating pregnancy in antepartum condition 1/6/2015    High risk pregnancy due to maternal drug abuse ([facility] [address] 1/6/2015    HSV-2 (herpes simplex virus 2) infection Last Episode 4-16    IBS (irritable bowel syndrome)     Panic attacks     Pelvic pain in female     Pneumonia Last Episode In [address]    PTSD (post-traumatic stress disorder) Sebastián.Olu Dx. Me With PTSD After My Mom Passed Away When I Was 16\"    Restless leg     Supervision of other high-risk pregnancy(V23.89) 2015    Teeth missing     Upper And Lower    UTI (urinary tract infection) In Past    No Current Symptoms    Wears partial dentures     Upper         Procedure Laterality Date    ABDOMINAL EXPLORATION SURGERY       SECTION  1-6-15     SECTION      DENTAL SURGERY      Teeth Extracted In Past    ENDOMETRIAL ABLATION  9-15,  Or     HYSTERECTOMY (CERVIX STATUS UNKNOWN)  2016    Total laparoscopic hysterectomy, BSO, cysto    HYSTERECTOMY, TOTAL ABDOMINAL (CERVIX REMOVED)         Medications:  Discharge Medication List as of 2023 12:22 PM        CONTINUE these medications which have NOT CHANGED    Details   Magic Mouthwash (MIRACLE MOUTHWASH) Swish and spit 5 mLs 4 times daily as needed for Dental Pain Equal parts mixture benadryl, maalox, and lidocaine. , Disp-240 mL, R-0Normal      ibuprofen (ADVIL;MOTRIN) 600 MG tablet Take 1 tablet by mouth every 6 hours as needed for Pain, Disp-30 tablet, R-0Normal      ondansetron (ZOFRAN ODT) 4 MG disintegrating tablet Take 1 tablet by mouth every 8 hours as needed for Nausea or Vomiting, Disp-15 tablet, R-0Normal      lidocaine (LIDODERM) 5 % Place 1 patch onto the skin daily May substitute for lidocaine 4% patch., Disp-10 patch, R-0Normal      !! acetaminophen (AMINOFEN) 325 MG tablet Take 2 tablets by mouth every 6 hours as needed for Pain, Disp-30 tablet, R-0Print      acyclovir (ZOVIRAX) 5 % CREA Apply topically 5 times a day for 4 days. , Disp-1 each, R-0Normal      neomycin-bacitracin-polymyxin (NEOSPORIN) 400-5-5000 ointment Apply topically 2 times daily. , Disp-1 each, R-0, Normal      !! acetaminophen (TYLENOL) 325 MG tablet Take 2 tablets by mouth every 6 hours as needed for Pain, Disp-120 tablet, R-3Print       !! - Potential duplicate medications found. Please discuss with provider.             Review of Systems:  (1 systems needed)  Review of Systems    \"Positives and Pertinent negatives as per HPI\"    Physical Exam:  Physical Exam  HENT:      Head: Normocephalic. No masses. Jaw: There is normal jaw occlusion. No trismus, swelling or malocclusion. Salivary Glands: Right salivary gland is not diffusely enlarged or tender. Left salivary gland is not diffusely enlarged or tender. Mouth/Throat:      Mouth: Mucous membranes are moist. No lacerations, oral lesions or angioedema. Dentition: Does not have dentures. Dental tenderness (lower central incisors; right upper gingiva/buccal surface) present. No gingival swelling, dental caries or dental abscesses. Pharynx: Uvula midline. No posterior oropharyngeal erythema or uvula swelling. Tonsils: No tonsillar exudate or tonsillar abscesses. Constitutional:       General: alert and oriented, well appearing, no acute distress, Well-developed. Not toxic-appearing. Eyes:      EOM intact, no conjunctival injection  Neck: supple, normal ROM without stiffness   Cardiovascular:      Regular rate  Pulmonary:      Effort: No respiratory distress     Breath sounds: Normal breath sounds. No stridor. No wheezing or rhonchi. Abdominal:      General: No evidence of abdominal discomfort or peritoneal signs; Absent: guarding, rebound, distention  Musculoskeletal:         General: Grossly unremarkable on simple inspection; no deformities or decreased ROM;  Absent: BLE edema  Skin:     General: warm, dry, intact, normal color for age and race; no concerning rashes or lesions  Neurological:      Alert, moves all extremities, no focal deficits  Psych: Normal mood and affect; appropriate for situation,  no evidence of disorganized thoughts or confusion     ED COURSE / MEDICAL DECISION MAKING :   Vitals:    02/13/23 1054 02/13/23 1055   BP:  115/80   Pulse:  100   Resp:  18   Temp: 97.9 °F (36.6 °C)    SpO2:  100%   Weight: 115 lb (52.2 kg)        LABS:Labs Reviewed - No data to display     Remainder of labs reviewed and were negative at this time or not returned at the time of this note. RADIOLOGY:   Non-plain film images such as CT, Ultrasound and MRI are read by the radiologist. Ashish Rubalcava PA-C have directly visualized the radiologic plain film image(s) with the below findings:    Interpretation per the Radiologist below, if available at the time of this note:    No orders to display        No results found. PROCEDURES:   Procedures    None    Patient was given:  Medications - No data to display      HPI SUMMARY, DDX,  REASSESSMENT, MEDICAL DECISION MAKING :     Patient with history as above presented with Dental Pain   Patient was nontoxic, stable. History and Exam as above. On examination she was resting comfortably no acute distress no appreciable facial swelling. No gingival swelling. She localizes tender [place] in 2 areas, lower central incisors I saw no gingival swelling, some mild dental decay. Also noted tenderness to left gingiva. She does have a small palpable area along her buccal surface that is not fluctuant, I approximate less than 0.5 cm. There is no noted erythema. Uvula is midline. No TMJ tenderness. No malocclusion no respiratory distress. Differentials considered. She has had history of dental issues in the past, history of dental pain in the past.  She does describe some acute worsening. This appears to be acute exacerbation, I reviewed external records. Differential diagnosis considered. Overall presentation is consistent with possible pulpitis, however cannot rule out early abscess. But I do not see any evidence of any abscess with draining at this point. Low suspicion for sepsis, respiratory distress. We will prescribe a prescription for clindamycin for potential infectious process, with recommendations to continue ibuprofen and Tylenol at home.   I do not feel comfortable offering narcotic prescriptions for home for her condition. She did request analgesics here. I had offered dental block however she had declined. I did discuss need for follow-up with dental provider, return precautions. Discussed I would give her outpatient resources. Patient seen ambulatory from exam room to waiting room in no acute distress. This was after my discharge but before nurses provision of AVS paperwork which included resources. Consideration was given for imaging, but normal vitals , no acute distress no soft tissue swelling, I saw no evidence to warrant any emergent work-up or lab work at this point. The patient was stable for outpatient management. Disposition: Discussed need to follow up diagnostics, including incidental findings. Discharged with instructions to obtain outpatient follow up of patient's symptoms and findings, with strict return precautions if patient develops new or worsening symptoms. Follow-up plan and return precautions were provided and discussed in detail patient in agreement. Patient was given the following medications:  Medications - No data to display    Independent Imaging Interpretation by me:     EKG: When ordered, EKG's are interpreted by the Emergency Department Physician in the absence of a cardiologist.  Please see their note for interpretation of EKG.     Chronic conditions affecting care:    has a past medical history of Anxiety, Anxiety attack, Asthma, Bipolar disorder ([facility] [address] Bronchitis (Last Episode In 2007), Chronic back pain, Depression, Endometriosis, Genital herpes complicating pregnancy in antepartum condition (1/6/2015), High risk pregnancy due to maternal drug abuse ([facility] [address] (1/6/2015), HSV-2 (herpes simplex virus 2) infection (Last Episode 4-16), IBS (irritable bowel syndrome), Panic attacks, Pelvic pain in female, Pneumonia (Last Episode In [address]), PTSD (post-traumatic stress disorder), Restless leg, Supervision of other high-risk pregnancy(V23.89) (1/6/2015), Teeth missing, UTI (urinary tract infection) (In Past), and Wears partial dentures. Discussion with Other Profesionals : None    Social Determinants of Health : None    Records Reviewed : PDMP has had multiple oxycodone rx in the past; no active; noted     Disposition Considerations (tests considered but not done, Shared Decision Making, Pt Expectation of Test or Tx.): none (unless indicated in ED Course, Summary, Reassessment, or MDM    Appropriate for outpatient management      I am the Primary Clinician of Record. The patient tolerated their visit well. I evaluated the patient. The physician was available for consultation as needed. The patient and / or the family were informed of the results of any tests, a time was given to answer questions, a plan was proposed and they agreed with plan. I am the Primary Clinician of Record. CLINICAL IMPRESSION:  1. Pain of gingiva    2. Toothache        DISPOSITION Decision To Discharge 02/13/2023 12:16:46 PM      PATIENT REFERRED TO:  No follow-up provider specified.     DISCHARGE MEDICATIONS:  Discharge Medication List as of 2/13/2023 12:22 PM        START taking these medications    Details   clindamycin (CLEOCIN) 150 MG capsule Take 3 capsules by mouth 3 times daily for 10 days, Disp-90 capsule, R-0Normal             DISCONTINUED MEDICATIONS:  Discharge Medication List as of 2/13/2023 12:22 PM        STOP taking these medications       meloxicam (MOBIC) 7.5 MG tablet Comments:   Reason for Stopping:                      (Please note the MDM and HPI sections of this note were completed with a voice recognition program.  Efforts were made to edit the dictations but occasionally words are mis-transcribed.)    Electronically signed, Alejandra Morgan PA-C,           Alejandra Morgan PA-C  02/13/23 [id number]

## 2023-02-13 NOTE — ED TRIAGE NOTES
Pt presents to ED for chronic dental pain with possible infection at this time, states she has not been able to get into the dental office

## 2023-02-13 NOTE — DISCHARGE INSTRUCTIONS
Please call your dental provider  to schedule an appointment for reevaluation this week or next for reevaluation of your symptoms, to discuss your visit to the ED and for definitive evaluation and treatment of your dental pain. For pain relief you can use over the counter medications such as ibuprofen (e.g. Advil, Motrin) or acetaminophen (e.g. Tylenol). Use ibuprofen taking 600mg every 6 hours. If  you need additional pain relief add over the counter tylenol, taking 1000 mg every 6 hours. Do not use these if you have any allergies or contraindications to these medications. Return  to Emergency Department with any facial swelling, throat swelling, difficulty breathing, difficulty swallowing, fevers, neck pain, drooling,fevers over 100.4,  if you are unable to close or open your mouth, worsening symptoms or any new concerns. If needed, Dental Resources: [employer] [facility]s walk in [facility]   [address] [place] [address],[address], [address] [place] [address],  Natalie Stanford  [phone]  [email or web address]

## 2023-11-15 ENCOUNTER — HOSPITAL ENCOUNTER (EMERGENCY)
Age: 33
Discharge: HOME | End: 2023-11-15
Payer: COMMERCIAL

## 2023-11-15 VITALS
HEART RATE: 100 BPM | DIASTOLIC BLOOD PRESSURE: 73 MMHG | HEIGHT: 62 IN | OXYGEN SATURATION: 97 % | BODY MASS INDEX: 21.53 KG/M2 | SYSTOLIC BLOOD PRESSURE: 106 MMHG | RESPIRATION RATE: 16 BRPM | TEMPERATURE: 97.6 F | WEIGHT: 117 LBS

## 2023-11-15 DIAGNOSIS — S90.31XA CONTUSION OF RIGHT FOOT, INITIAL ENCOUNTER: ICD-10-CM

## 2023-11-15 DIAGNOSIS — S93.401A SPRAIN OF RIGHT ANKLE, UNSPECIFIED LIGAMENT, INITIAL ENCOUNTER: Primary | ICD-10-CM

## 2023-11-15 PROCEDURE — 6370000000 HC RX 637 (ALT 250 FOR IP): Performed by: NURSE PRACTITIONER

## 2023-11-15 PROCEDURE — 99283 EMERGENCY DEPT VISIT LOW MDM: CPT

## 2023-11-15 RX ORDER — FAMOTIDINE 20 MG/1
20 TABLET, FILM COATED ORAL 2 TIMES DAILY
Qty: 28 TABLET | Refills: 0 | Status: SHIPPED | OUTPATIENT
Start: 2023-11-15 | End: 2023-11-29

## 2023-11-15 RX ORDER — OXYCODONE HYDROCHLORIDE AND ACETAMINOPHEN 5; 325 MG/1; MG/1
1 TABLET ORAL ONCE
Status: COMPLETED | OUTPATIENT
Start: 2023-11-15 | End: 2023-11-15

## 2023-11-15 RX ORDER — OXYCODONE HYDROCHLORIDE AND ACETAMINOPHEN 5; 325 MG/1; MG/1
1 TABLET ORAL EVERY 6 HOURS PRN
Qty: 8 TABLET | Refills: 0 | Status: SHIPPED | OUTPATIENT
Start: 2023-11-15 | End: 2023-11-18

## 2023-11-15 RX ADMIN — OXYCODONE AND ACETAMINOPHEN 1 TABLET: 5; 325 TABLET ORAL at 13:35

## 2023-11-15 ASSESSMENT — PAIN - FUNCTIONAL ASSESSMENT: PAIN_FUNCTIONAL_ASSESSMENT: 0-10

## 2023-11-15 ASSESSMENT — PAIN DESCRIPTION - LOCATION
LOCATION: FOOT
LOCATION: FOOT

## 2023-11-15 ASSESSMENT — PAIN DESCRIPTION - ORIENTATION
ORIENTATION: RIGHT
ORIENTATION: RIGHT

## 2023-11-15 ASSESSMENT — PAIN DESCRIPTION - DESCRIPTORS: DESCRIPTORS: STABBING

## 2023-11-15 ASSESSMENT — PAIN SCALES - GENERAL
PAINLEVEL_OUTOF10: 8
PAINLEVEL_OUTOF10: 8

## 2023-11-15 NOTE — ED PROVIDER NOTES
Exclusion criteria - the patient is NOT to be included for SEP-1 Core Measure due to: Infection is not suspected     History from : Patient    Limitations to history : None    Patient was given the following medications:  Medications   oxyCODONE-acetaminophen (PERCOCET) 5-325 MG per tablet 1 tablet (1 tablet Oral Given 11/15/23 1335)       Imaging Interpretation: Not applicable    Telemetry: Not Applicable    ECG Interpretation: N/A    Chronic conditions affecting care: None    Testing considered by not ordered: None    Discussion with Other Profesionals : None    Social Determinants : None    Records Reviewed : Source reviewed medical record including visit from [place] 2 days ago, patient had negative x-rays of the right ankle and foot for dislocation or fracture. In brief, patient presented for evaluation of persistent right ankle and foot pain after an inversion injury and also crush injury with a large pumpkin over the weekend. She does have diffuse tenderness on exam, appears quite uncomfortable and tearful. There is no significant swelling and she is neurovascularly intact. Reassurance provided. Discussed realistic timeline for how long the symptoms may last and ways to improve pain management at home. We will trial switching her into a walking boot to see if immobilizing her foot improves pain. We will provide a small amount of pain medication and encouraged to continue taking over-the-counter Tylenol or ibuprofen as needed. We will also place her on Pepcid to help reduce potential for gastritis with NSAIDs. Work excuse provided. Encouraged follow-up with orthopedics if symptoms not improving next week. I am the Primary Clinician of Record. Condition on Discharge: Stable       CLINICAL IMPRESSION      1. Sprain of right ankle, unspecified ligament, initial encounter    2.  Contusion of right foot, initial encounter          DISPOSITION/PLAN   DISPOSITION Decision To Discharge 11/15/2023

## 2023-11-23 ENCOUNTER — HOSPITAL ENCOUNTER (EMERGENCY)
Age: 33
Discharge: HOME | End: 2023-11-23
Payer: COMMERCIAL

## 2023-11-23 VITALS
OXYGEN SATURATION: 97 % | TEMPERATURE: 97.7 F | HEART RATE: 87 BPM | RESPIRATION RATE: 16 BRPM | SYSTOLIC BLOOD PRESSURE: 100 MMHG | DIASTOLIC BLOOD PRESSURE: 58 MMHG

## 2023-11-23 DIAGNOSIS — M79.671 RIGHT FOOT PAIN: Primary | ICD-10-CM

## 2023-11-23 PROCEDURE — 6370000000 HC RX 637 (ALT 250 FOR IP): Performed by: PHYSICIAN ASSISTANT

## 2023-11-23 PROCEDURE — 99284 EMERGENCY DEPT VISIT MOD MDM: CPT

## 2023-11-23 PROCEDURE — 96372 THER/PROPH/DIAG INJ SC/IM: CPT

## 2023-11-23 PROCEDURE — 6360000002 HC RX W HCPCS: Performed by: PHYSICIAN ASSISTANT

## 2023-11-23 RX ORDER — LIDOCAINE 4 G/G
1 PATCH TOPICAL DAILY
Status: DISCONTINUED | OUTPATIENT
Start: 2023-11-23 | End: 2023-11-23 | Stop reason: HOSPADM

## 2023-11-23 RX ORDER — LIDOCAINE 50 MG/G
1 PATCH TOPICAL DAILY
Qty: 10 PATCH | Refills: 0 | Status: SHIPPED | OUTPATIENT
Start: 2023-11-23 | End: 2023-12-03

## 2023-11-23 RX ORDER — ORPHENADRINE CITRATE 30 MG/ML
60 INJECTION INTRAMUSCULAR; INTRAVENOUS ONCE
Status: COMPLETED | OUTPATIENT
Start: 2023-11-23 | End: 2023-11-23

## 2023-11-23 RX ADMIN — ORPHENADRINE CITRATE 60 MG: 60 INJECTION INTRAMUSCULAR; INTRAVENOUS at 16:04

## 2023-11-23 ASSESSMENT — PAIN SCALES - GENERAL: PAINLEVEL_OUTOF10: 6

## 2023-11-23 ASSESSMENT — PAIN DESCRIPTION - LOCATION: LOCATION: FOOT

## 2023-11-23 ASSESSMENT — PAIN DESCRIPTION - ORIENTATION: ORIENTATION: RIGHT

## 2023-11-23 NOTE — ED PROVIDER NOTES
MD Elpidio Blas  [phone]            DISCHARGE MEDICATIONS:  Discharge Medication List as of 11/23/2023  3:58 PM        START taking these medications    Details   lidocaine (LIDODERM) 5 % Place 1 patch onto the skin daily for 10 days 12 hours on, 12 hours off., Disp-10 patch, R-0Normal             DISCONTINUED MEDICATIONS:  Discharge Medication List as of 11/23/2023  3:58 PM        STOP taking these medications       meloxicam (MOBIC) 7.5 MG tablet Comments:   Reason for Stopping:                      (Please note that portions ofthis note were completed with a voice recognition program.  Efforts were made to edit the dictations but occasionally words are mis-transcribed. )    SUDHAKAR He (electronically signed)            Adenike Curtis [place]  11/23/23 9999

## 2023-11-23 NOTE — ED NOTES
Patient discharging home, AVS reviewed with no questions at this time. Patient instrcuted to follow up per discharge instructions and to return for worsening symptoms. Respirations equal and unlabored, skin PWD.        Sofiya Chandler RN  11/23/23 8864

## 2023-12-19 ENCOUNTER — HOSPITAL ENCOUNTER (EMERGENCY)
Age: 33
Discharge: HOME | End: 2023-12-19
Payer: COMMERCIAL

## 2023-12-19 VITALS
BODY MASS INDEX: 19.88 KG/M2 | RESPIRATION RATE: 18 BRPM | HEIGHT: 62 IN | DIASTOLIC BLOOD PRESSURE: 76 MMHG | SYSTOLIC BLOOD PRESSURE: 124 MMHG | OXYGEN SATURATION: 99 % | WEIGHT: 108 LBS | HEART RATE: 91 BPM | TEMPERATURE: 98.6 F

## 2023-12-19 DIAGNOSIS — E86.0 DEHYDRATION: ICD-10-CM

## 2023-12-19 DIAGNOSIS — R19.7 DIARRHEA, UNSPECIFIED TYPE: ICD-10-CM

## 2023-12-19 DIAGNOSIS — B34.9 VIRAL INFECTION: Primary | ICD-10-CM

## 2023-12-19 DIAGNOSIS — R11.0 NAUSEA: ICD-10-CM

## 2023-12-19 LAB
ALBUMIN SERPL-MCNC: 4.9 GM/DL (ref 3.4–5)
ALP BLD-CCNC: 85 IU/L (ref 40–129)
ALT SERPL-CCNC: 12 U/L (ref 10–40)
ANION GAP SERPL CALCULATED.3IONS-SCNC: 14 MMOL/L (ref 4–16)
AST SERPL-CCNC: 17 IU/L (ref 15–37)
BASOPHILS ABSOLUTE: 0.1 K/CU MM
BASOPHILS RELATIVE PERCENT: 0.5 % (ref 0–1)
BILIRUB SERPL-MCNC: 0.2 MG/DL (ref 0–1)
BUN SERPL-MCNC: 7 MG/DL (ref 6–23)
CALCIUM SERPL-MCNC: 9.8 MG/DL (ref 8.3–10.6)
CHLORIDE BLD-SCNC: 104 MMOL/L (ref 99–110)
CO2: 23 MMOL/L (ref 21–32)
CREAT SERPL-MCNC: 0.5 MG/DL (ref 0.6–1.1)
DIFFERENTIAL TYPE: ABNORMAL
EOSINOPHILS ABSOLUTE: 0.1 K/CU MM
EOSINOPHILS RELATIVE PERCENT: 0.7 % (ref 0–3)
GFR SERPL CREATININE-BSD FRML MDRD: >60 ML/MIN/1.73M2
GLUCOSE SERPL-MCNC: 138 MG/DL (ref 70–99)
HCT VFR BLD CALC: 45.4 % (ref 37–47)
HEMOGLOBIN: 15.4 GM/DL (ref 12.5–16)
IMMATURE NEUTROPHIL %: 0.3 % (ref 0–0.43)
INFLUENZA A ANTIGEN: NOT DETECTED
INFLUENZA B ANTIGEN: NOT DETECTED
LYMPHOCYTES ABSOLUTE: 1.5 K/CU MM
LYMPHOCYTES RELATIVE PERCENT: 14.7 % (ref 24–44)
MCH RBC QN AUTO: 31.8 PG (ref 27–31)
MCHC RBC AUTO-ENTMCNC: 33.9 % (ref 32–36)
MCV RBC AUTO: 93.6 FL (ref 78–100)
MONOCYTES ABSOLUTE: 1 K/CU MM
MONOCYTES RELATIVE PERCENT: 9.6 % (ref 0–4)
NUCLEATED RBC %: 0 %
PDW BLD-RTO: 14 % (ref 11.7–14.9)
PLATELET # BLD: 449 K/CU MM (ref 140–440)
PMV BLD AUTO: 10.6 FL (ref 7.5–11.1)
POTASSIUM SERPL-SCNC: 4.1 MMOL/L (ref 3.5–5.1)
RBC # BLD: 4.85 M/CU MM (ref 4.2–5.4)
SARS-COV-2 RDRP RESP QL NAA+PROBE: NOT DETECTED
SEGMENTED NEUTROPHILS ABSOLUTE COUNT: 7.8 K/CU MM
SEGMENTED NEUTROPHILS RELATIVE PERCENT: 74.2 % (ref 36–66)
SODIUM BLD-SCNC: 141 MMOL/L (ref 135–145)
SOURCE: NORMAL
TOTAL IMMATURE NEUTOROPHIL: 0.03 K/CU MM
TOTAL NUCLEATED RBC: 0 K/CU MM
TOTAL PROTEIN: 7.8 GM/DL (ref 6.4–8.2)
WBC # BLD: 10.5 K/CU MM (ref 4–10.5)

## 2023-12-19 PROCEDURE — 85025 COMPLETE CBC W/AUTO DIFF WBC: CPT

## 2023-12-19 PROCEDURE — 80053 COMPREHEN METABOLIC PANEL: CPT

## 2023-12-19 PROCEDURE — 6360000002 HC RX W HCPCS: Performed by: NURSE PRACTITIONER

## 2023-12-19 PROCEDURE — 2580000003 HC RX 258: Performed by: NURSE PRACTITIONER

## 2023-12-19 PROCEDURE — 87502 INFLUENZA DNA AMP PROBE: CPT

## 2023-12-19 PROCEDURE — 6370000000 HC RX 637 (ALT 250 FOR IP): Performed by: NURSE PRACTITIONER

## 2023-12-19 PROCEDURE — 96374 THER/PROPH/DIAG INJ IV PUSH: CPT

## 2023-12-19 PROCEDURE — 96375 TX/PRO/DX INJ NEW DRUG ADDON: CPT

## 2023-12-19 PROCEDURE — 99284 EMERGENCY DEPT VISIT MOD MDM: CPT

## 2023-12-19 PROCEDURE — 87635 SARS-COV-2 COVID-19 AMP PRB: CPT

## 2023-12-19 RX ORDER — ONDANSETRON 2 MG/ML
4 INJECTION INTRAMUSCULAR; INTRAVENOUS ONCE
Status: COMPLETED | OUTPATIENT
Start: 2023-12-19 | End: 2023-12-19

## 2023-12-19 RX ORDER — KETOROLAC TROMETHAMINE 15 MG/ML
30 INJECTION, SOLUTION INTRAMUSCULAR; INTRAVENOUS ONCE
Status: COMPLETED | OUTPATIENT
Start: 2023-12-19 | End: 2023-12-19

## 2023-12-19 RX ORDER — ACETAMINOPHEN 500 MG
1000 TABLET ORAL
Status: COMPLETED | OUTPATIENT
Start: 2023-12-19 | End: 2023-12-19

## 2023-12-19 RX ORDER — DEXTROMETHORPHAN HYDROBROMIDE, GUAIFENESIN AND PSEUDOEPHEDRINE HYDROCHLORIDE 15; 400; 60 MG/1; MG/1; MG/1
1 TABLET ORAL EVERY 6 HOURS
Qty: 20 TABLET | Refills: 0 | Status: SHIPPED | OUTPATIENT
Start: 2023-12-19 | End: 2023-12-24

## 2023-12-19 RX ORDER — ONDANSETRON 4 MG/1
4 TABLET, ORALLY DISINTEGRATING ORAL 3 TIMES DAILY PRN
Qty: 9 TABLET | Refills: 0 | Status: SHIPPED | OUTPATIENT
Start: 2023-12-19 | End: 2023-12-22

## 2023-12-19 RX ORDER — DIPHENHYDRAMINE HYDROCHLORIDE 50 MG/ML
25 INJECTION INTRAMUSCULAR; INTRAVENOUS ONCE
Status: COMPLETED | OUTPATIENT
Start: 2023-12-19 | End: 2023-12-19

## 2023-12-19 RX ORDER — 0.9 % SODIUM CHLORIDE 0.9 %
1000 INTRAVENOUS SOLUTION INTRAVENOUS ONCE
Status: COMPLETED | OUTPATIENT
Start: 2023-12-19 | End: 2023-12-19

## 2023-12-19 RX ADMIN — SODIUM CHLORIDE 1000 ML: 9 INJECTION, SOLUTION INTRAVENOUS at 15:58

## 2023-12-19 RX ADMIN — ACETAMINOPHEN 1000 MG: 500 TABLET ORAL at 17:26

## 2023-12-19 RX ADMIN — KETOROLAC TROMETHAMINE 30 MG: 15 INJECTION, SOLUTION INTRAMUSCULAR; INTRAVENOUS at 15:56

## 2023-12-19 RX ADMIN — DIPHENHYDRAMINE HYDROCHLORIDE 25 MG: 50 INJECTION, SOLUTION INTRAMUSCULAR; INTRAVENOUS at 15:56

## 2023-12-19 RX ADMIN — ONDANSETRON 4 MG: 2 INJECTION INTRAMUSCULAR; INTRAVENOUS at 15:56

## 2023-12-19 NOTE — ED PROVIDER NOTES
[address] ENCOUNTER        Pt Name: Lesley Ray  MRN: [id number]  [address] [facility] 1990  Date of evaluation: 12/19/2023  Provider: Penne Cooks, APRN - CNP  PCP: No primary care provider on file. WILLIAM. I have evaluated this patient. Triage CHIEF COMPLAINT       Chief Complaint   Patient presents with    Diarrhea     Abdominal pain, fever, diarrhea x3 days         HISTORY OF PRESENT ILLNESS      Chief Complaint: congestion, seats, chills, bodyaches, N/D    Lesley Ray is a 35 y.o. female who presents congestion, seats, chills, bodyaches, N/D for 2 days. Here with daughter with similar symptoms. Reports some abdominal pain, significant nausea, and frequent diarrhea. She feels dehydrated, having difficulty getting fluids down with nausea. Has headache and severe bodyaches. Taking ibuprofen/tylenol at home with inadequate relief. No fevers. Nursing Notes were all reviewed and agreed with or any disagreements were addressed in the HPI. REVIEW OF SYSTEMS     Pertinent ROS as noted in HPI. PAST MEDICAL HISTORY     Past Medical History:   Diagnosis Date    Anxiety     Anxiety attack     Asthma     No Pulmonologist At This Time    Bipolar disorder ([address])     Bronchitis Last Episode In 2007    Chronic back pain     Depression     Endometriosis     Genital herpes complicating pregnancy in antepartum condition 1/6/2015    High risk pregnancy due to maternal drug abuse ([address]) 1/6/2015    HSV-2 (herpes simplex virus 2) infection Last Episode 4-16    IBS (irritable bowel syndrome)     Panic attacks     Pelvic pain in female     Pneumonia Last Episode In [address]    PTSD (post-traumatic stress disorder)     \"They Dx.  Me With PTSD After My Mom Passed Away When I Was 16\"    Restless leg     Supervision of other high-risk pregnancy(V23.89) 1/6/2015    Teeth missing     Upper And Lower    UTI (urinary

## 2024-01-03 ENCOUNTER — HOSPITAL ENCOUNTER (EMERGENCY)
Age: 34
Discharge: HOME | End: 2024-01-03
Payer: COMMERCIAL

## 2024-01-03 VITALS
HEIGHT: 61 IN | SYSTOLIC BLOOD PRESSURE: 122 MMHG | OXYGEN SATURATION: 99 % | BODY MASS INDEX: 20.77 KG/M2 | RESPIRATION RATE: 18 BRPM | HEART RATE: 76 BPM | WEIGHT: 110 LBS | TEMPERATURE: 98.3 F | DIASTOLIC BLOOD PRESSURE: 84 MMHG

## 2024-01-03 DIAGNOSIS — B00.9 HERPETIC LESION: Primary | ICD-10-CM

## 2024-01-03 PROCEDURE — 99283 EMERGENCY DEPT VISIT LOW MDM: CPT

## 2024-01-03 RX ORDER — VALACYCLOVIR HYDROCHLORIDE 500 MG/1
500 TABLET, FILM COATED ORAL 2 TIMES DAILY
Qty: 10 TABLET | Refills: 0 | Status: SHIPPED | OUTPATIENT
Start: 2024-01-03 | End: 2024-01-08

## 2024-01-03 RX ORDER — ACYCLOVIR 50 MG/G
OINTMENT TOPICAL
Qty: 5 G | Refills: 1 | Status: SHIPPED | OUTPATIENT
Start: 2024-01-03 | End: 2024-01-10

## 2024-01-03 NOTE — ED PROVIDER NOTES
[facility] EMERGENCY DEPARTMENT  EMERGENCY DEPARTMENT ENCOUNTER      Pt Name: Zandra Bullock  MRN: [id number]  Birthdate 1990  Date of evaluation: 1/3/2024  Provider: JASMIN Mcwilliams - CNP  PCP: No primary care provider on file.  Note Started: 1:43 PM EST 1/3/24    I am the Primary Clinician of Record.  WILLIAM. I have evaluated this patient.    CHIEF COMPLAINT       Chief Complaint   Patient presents with    Vaginal Itching     States hx of genital herpes x 13 years, reports flare up this morning along with burning/itching. Reports she does not have a family dr to take care of this.        HISTORY OF PRESENT ILLNESS: 1 or more Elements   Zandra Bullock is a 33 y.o. female with history significant for HSV-2 who presents with herpes outbreak x 1 days. Patient states she has been on valtrex on and off for the last 13 years. Patient states she does not have a PCP and gets her prescriptions from the ER. She noticed a vesicle on her groin today. Denies vaginal itching. No recent fever. She denies fever, chills, N/V/D, abdominal pain, dark or bloody stools, urgency, hematuria, flank pain, abnormal vaginal bleeding or discharge. She typically uses valtrex orally and topical acyclovir, but is out. She is not concerned for STI otherwise.      I have reviewed the nursing triage documentation and agree unless otherwise noted.  REVIEW OF SYSTEMS :    Review of Systems   Genitourinary:  Positive for genital sores. Negative for difficulty urinating, vaginal bleeding and vaginal pain.     Positives and Pertinent negatives as per HPI.   SURGICAL HISTORY     Past Surgical History:   Procedure Laterality Date    ABDOMINAL EXPLORATION SURGERY       SECTION  1-6-15     SECTION      DENTAL SURGERY      Teeth Extracted In Past    ENDOMETRIAL ABLATION  9-15,  Or     HYSTERECTOMY (CERVIX STATUS UNKNOWN)  2016    Total laparoscopic hysterectomy, BSO,

## 2024-04-28 ENCOUNTER — HOSPITAL ENCOUNTER (EMERGENCY)
Age: 34
Discharge: HOME | End: 2024-04-28
Payer: COMMERCIAL

## 2024-04-28 VITALS
OXYGEN SATURATION: 97 % | BODY MASS INDEX: 22.66 KG/M2 | DIASTOLIC BLOOD PRESSURE: 70 MMHG | HEIGHT: 61 IN | WEIGHT: 120 LBS | SYSTOLIC BLOOD PRESSURE: 122 MMHG | TEMPERATURE: 98.2 F | HEART RATE: 75 BPM | RESPIRATION RATE: 16 BRPM

## 2024-04-28 DIAGNOSIS — K02.9 DENTAL DECAY: ICD-10-CM

## 2024-04-28 DIAGNOSIS — K04.7 DENTAL INFECTION: Primary | ICD-10-CM

## 2024-04-28 PROCEDURE — 99283 EMERGENCY DEPT VISIT LOW MDM: CPT

## 2024-04-28 PROCEDURE — 6370000000 HC RX 637 (ALT 250 FOR IP)

## 2024-04-28 RX ORDER — AMOXICILLIN AND CLAVULANATE POTASSIUM 875; 125 MG/1; MG/1
1 TABLET, FILM COATED ORAL 2 TIMES DAILY
Qty: 14 TABLET | Refills: 0 | Status: SHIPPED | OUTPATIENT
Start: 2024-04-28 | End: 2024-05-05

## 2024-04-28 RX ORDER — IBUPROFEN 600 MG/1
600 TABLET ORAL 3 TIMES DAILY PRN
Qty: 30 TABLET | Refills: 0 | Status: SHIPPED | OUTPATIENT
Start: 2024-04-28

## 2024-04-28 RX ORDER — IBUPROFEN 600 MG/1
600 TABLET ORAL 3 TIMES DAILY PRN
Qty: 30 TABLET | Refills: 0 | Status: SHIPPED | OUTPATIENT
Start: 2024-04-28 | End: 2024-04-28

## 2024-04-28 RX ORDER — OXYCODONE HYDROCHLORIDE AND ACETAMINOPHEN 5; 325 MG/1; MG/1
1 TABLET ORAL EVERY 6 HOURS PRN
Qty: 3 TABLET | Refills: 0 | Status: SHIPPED | OUTPATIENT
Start: 2024-04-28 | End: 2024-04-28

## 2024-04-28 RX ORDER — OXYCODONE HYDROCHLORIDE AND ACETAMINOPHEN 5; 325 MG/1; MG/1
1 TABLET ORAL EVERY 6 HOURS PRN
Qty: 3 TABLET | Refills: 0 | Status: SHIPPED | OUTPATIENT
Start: 2024-04-28 | End: 2024-04-29

## 2024-04-28 RX ORDER — AMOXICILLIN AND CLAVULANATE POTASSIUM 875; 125 MG/1; MG/1
1 TABLET, FILM COATED ORAL 2 TIMES DAILY
Qty: 14 TABLET | Refills: 0 | Status: SHIPPED | OUTPATIENT
Start: 2024-04-28 | End: 2024-04-28

## 2024-04-28 RX ORDER — ONDANSETRON 4 MG/1
4 TABLET, ORALLY DISINTEGRATING ORAL 3 TIMES DAILY PRN
Qty: 21 TABLET | Refills: 0 | Status: SHIPPED | OUTPATIENT
Start: 2024-04-28

## 2024-04-28 RX ORDER — ONDANSETRON 4 MG/1
4 TABLET, ORALLY DISINTEGRATING ORAL 3 TIMES DAILY PRN
Qty: 21 TABLET | Refills: 0 | Status: SHIPPED | OUTPATIENT
Start: 2024-04-28 | End: 2024-04-28

## 2024-04-28 RX ORDER — OXYCODONE HYDROCHLORIDE AND ACETAMINOPHEN 5; 325 MG/1; MG/1
1 TABLET ORAL ONCE
Status: COMPLETED | OUTPATIENT
Start: 2024-04-28 | End: 2024-04-28

## 2024-04-28 RX ADMIN — OXYCODONE AND ACETAMINOPHEN 1 TABLET: 5; 325 TABLET ORAL at 18:21

## 2024-04-28 ASSESSMENT — PAIN SCALES - GENERAL
PAINLEVEL_OUTOF10: 6
PAINLEVEL_OUTOF10: 8
PAINLEVEL_OUTOF10: 8

## 2024-04-28 ASSESSMENT — PAIN DESCRIPTION - PAIN TYPE: TYPE: ACUTE PAIN

## 2024-04-28 ASSESSMENT — PAIN DESCRIPTION - LOCATION
LOCATION: MOUTH
LOCATION: TEETH

## 2024-04-28 ASSESSMENT — PAIN DESCRIPTION - ORIENTATION: ORIENTATION: LEFT;UPPER

## 2024-04-28 ASSESSMENT — PAIN - FUNCTIONAL ASSESSMENT: PAIN_FUNCTIONAL_ASSESSMENT: 0-10

## 2024-04-28 NOTE — ED TRIAGE NOTES
Dental pain x1 week missed her apt on Tuesday next one not until Wednesday but pain is worse today and more chipped off of tooth.

## 2024-04-28 NOTE — ED PROVIDER NOTES
[facility] EMERGENCY DEPARTMENT  EMERGENCY DEPARTMENT ENCOUNTER        Pt Name: Zandra Bullock  MRN: [id number]  Birthdate 1990  Date of evaluation: 2024  Provider: JASMIN Tobar CNP  PCP: No primary care provider on file.  Note Started: 6:29 PM EDT 24      WILLIAM. I have evaluated this patient.        CHIEF COMPLAINT       Chief Complaint   Patient presents with    Dental Pain     Pt has appt with dentist on Wednesday but states her filling fell out       HISTORY OF PRESENT ILLNESS: 1 or more Elements     History From: Patient    Limitations to history : None    Social Determinants Significantly Affecting Health : None    Chief Complaint: Dental infection    Zandra Bullock is a 33 y.o. female with a history of asthma PTSD dentures who presents to ED stating that she was seen at [place] over a week ago for a dental infection.  States she slept through her dental appointment on Tuesday.  States she feels as if a new piece of her tooth fell off.  States she does have an appointment again scheduled for Wednesday at 9:30 AM.  She states she just needs something to help her get through the pain while she is waiting for her dental appointment.  States she completed the clindamycin prescription except for the last few pills because it aggravated her stomach.  Denies any fever body aches chills denies any sinus pressure or pain ear pain or throat pain.  Denies any facial swelling.    Nursing Notes were all reviewed and agreed with or any disagreements were addressed in the HPI.    REVIEW OF SYSTEMS :      Review of Systems    Positives and Pertinent negatives as per HPI.     SURGICAL HISTORY     Past Surgical History:   Procedure Laterality Date    ABDOMINAL EXPLORATION SURGERY       SECTION  1-6-15     SECTION      DENTAL SURGERY      Teeth Extracted In Past    ENDOMETRIAL ABLATION  9-15,  Or     HYSTERECTOMY (CERVIX STATUS

## 2024-07-10 ENCOUNTER — HOSPITAL ENCOUNTER (EMERGENCY)
Age: 34
Discharge: LEFT BEFORE DISCHARGE | End: 2024-07-10

## 2024-07-10 VITALS
SYSTOLIC BLOOD PRESSURE: 112 MMHG | HEART RATE: 87 BPM | DIASTOLIC BLOOD PRESSURE: 80 MMHG | TEMPERATURE: 97.9 F | OXYGEN SATURATION: 97 % | RESPIRATION RATE: 16 BRPM

## 2024-07-10 NOTE — ED PROVIDER NOTES
Although I was initially assigned to Zandra Bullock, I did not see, examine, or participate in this patient's care.    Electronically signed by: Chet Rice PA-C, 7/10/2024 1:27 PM        Louie Rice PA-C  07/10/24 [id number]